# Patient Record
Sex: MALE | Race: WHITE | NOT HISPANIC OR LATINO
[De-identification: names, ages, dates, MRNs, and addresses within clinical notes are randomized per-mention and may not be internally consistent; named-entity substitution may affect disease eponyms.]

---

## 2017-11-22 PROBLEM — Z00.00 ENCOUNTER FOR PREVENTIVE HEALTH EXAMINATION: Status: ACTIVE | Noted: 2017-11-22

## 2017-11-30 ENCOUNTER — APPOINTMENT (OUTPATIENT)
Dept: GASTROENTEROLOGY | Facility: CLINIC | Age: 72
End: 2017-11-30
Payer: MEDICARE

## 2017-11-30 VITALS
WEIGHT: 147 LBS | BODY MASS INDEX: 21.05 KG/M2 | SYSTOLIC BLOOD PRESSURE: 130 MMHG | HEIGHT: 70 IN | TEMPERATURE: 98.1 F | DIASTOLIC BLOOD PRESSURE: 82 MMHG

## 2017-11-30 DIAGNOSIS — Z87.09 PERSONAL HISTORY OF OTHER DISEASES OF THE RESPIRATORY SYSTEM: ICD-10-CM

## 2017-11-30 DIAGNOSIS — Z78.9 OTHER SPECIFIED HEALTH STATUS: ICD-10-CM

## 2017-11-30 DIAGNOSIS — Z86.39 PERSONAL HISTORY OF OTHER ENDOCRINE, NUTRITIONAL AND METABOLIC DISEASE: ICD-10-CM

## 2017-11-30 PROCEDURE — 99204 OFFICE O/P NEW MOD 45 MIN: CPT

## 2017-12-01 ENCOUNTER — RESULT REVIEW (OUTPATIENT)
Age: 72
End: 2017-12-01

## 2017-12-01 LAB
ALBUMIN SERPL ELPH-MCNC: 4.1 G/DL
ALP BLD-CCNC: 91 U/L
ALT SERPL-CCNC: 21 U/L
ANION GAP SERPL CALC-SCNC: 16 MMOL/L
AST SERPL-CCNC: 35 U/L
BASOPHILS # BLD AUTO: 0.03 K/UL
BASOPHILS NFR BLD AUTO: 0.4 %
BILIRUB SERPL-MCNC: 0.6 MG/DL
BUN SERPL-MCNC: 13 MG/DL
CALCIUM SERPL-MCNC: 9.4 MG/DL
CHLORIDE SERPL-SCNC: 101 MMOL/L
CO2 SERPL-SCNC: 25 MMOL/L
CREAT SERPL-MCNC: 0.96 MG/DL
CRP SERPL-MCNC: 0.7 MG/DL
EOSINOPHIL # BLD AUTO: 0.32 K/UL
EOSINOPHIL NFR BLD AUTO: 3.8
ERYTHROCYTE [SEDIMENTATION RATE] IN BLOOD BY WESTERGREN METHOD: 22 MM/HR
FERRITIN SERPL-MCNC: 23 NG/ML
GLUCOSE SERPL-MCNC: 70 MG/DL
HCT VFR BLD CALC: 45.1 %
HGB BLD-MCNC: 14.7 G/DL
IMM GRANULOCYTES NFR BLD AUTO: 0.4 %
IRON SATN MFR SERPL: 10 %
IRON SERPL-MCNC: 34 UG/DL
LYMPHOCYTES # BLD AUTO: 0.98 K/UL
LYMPHOCYTES NFR BLD AUTO: 11.6 %
MAN DIFF?: NORMAL
MCHC RBC-ENTMCNC: 28.9 PG
MCHC RBC-ENTMCNC: 32.6 GM/DL
MCV RBC AUTO: 88.6 FL
MONOCYTES # BLD AUTO: 0.94 K/UL
MONOCYTES NFR BLD AUTO: 11.1 %
NEUTROPHILS # BLD AUTO: 6.15 K/UL
NEUTROPHILS NFR BLD AUTO: 72.7 %
PLATELET # BLD AUTO: 348 K/UL
POTASSIUM SERPL-SCNC: 4 MMOL/L
PROT SERPL-MCNC: 7.5 G/DL
RBC # BLD: 5.09 M/UL
RBC # FLD: 13.6 %
SODIUM SERPL-SCNC: 142 MMOL/L
TIBC SERPL-MCNC: 352 UG/DL
TSH SERPL-ACNC: 2.47 UIU/ML
UIBC SERPL-MCNC: 318 UG/DL
WBC # FLD AUTO: 8.45 K/UL

## 2017-12-01 RX ORDER — MESALAMINE 1.2 G/1
1.2 TABLET, DELAYED RELEASE ORAL
Qty: 360 | Refills: 1 | Status: DISCONTINUED | COMMUNITY
Start: 2017-11-30 | End: 2017-12-01

## 2017-12-04 ENCOUNTER — OTHER (OUTPATIENT)
Age: 72
End: 2017-12-04

## 2017-12-07 ENCOUNTER — FORM ENCOUNTER (OUTPATIENT)
Age: 72
End: 2017-12-07

## 2017-12-08 ENCOUNTER — APPOINTMENT (OUTPATIENT)
Dept: CT IMAGING | Facility: CLINIC | Age: 72
End: 2017-12-08
Payer: MEDICARE

## 2017-12-08 ENCOUNTER — OUTPATIENT (OUTPATIENT)
Dept: OUTPATIENT SERVICES | Facility: HOSPITAL | Age: 72
LOS: 1 days | End: 2017-12-08
Payer: COMMERCIAL

## 2017-12-08 DIAGNOSIS — Z00.8 ENCOUNTER FOR OTHER GENERAL EXAMINATION: ICD-10-CM

## 2017-12-08 PROCEDURE — 74177 CT ABD & PELVIS W/CONTRAST: CPT | Mod: 26

## 2017-12-08 PROCEDURE — 74177 CT ABD & PELVIS W/CONTRAST: CPT

## 2017-12-11 ENCOUNTER — OTHER (OUTPATIENT)
Age: 72
End: 2017-12-11

## 2017-12-12 ENCOUNTER — OTHER (OUTPATIENT)
Age: 72
End: 2017-12-12

## 2017-12-13 ENCOUNTER — OTHER (OUTPATIENT)
Age: 72
End: 2017-12-13

## 2017-12-18 ENCOUNTER — APPOINTMENT (OUTPATIENT)
Dept: GASTROENTEROLOGY | Facility: CLINIC | Age: 72
End: 2017-12-18
Payer: MEDICARE

## 2017-12-18 VITALS
DIASTOLIC BLOOD PRESSURE: 80 MMHG | WEIGHT: 150 LBS | SYSTOLIC BLOOD PRESSURE: 138 MMHG | TEMPERATURE: 98 F | HEIGHT: 70 IN | BODY MASS INDEX: 21.47 KG/M2

## 2017-12-18 DIAGNOSIS — R19.7 DIARRHEA, UNSPECIFIED: ICD-10-CM

## 2017-12-18 DIAGNOSIS — K51.90 ULCERATIVE COLITIS, UNSPECIFIED, W/OUT COMPLICATIONS: ICD-10-CM

## 2017-12-18 DIAGNOSIS — R63.4 ABNORMAL WEIGHT LOSS: ICD-10-CM

## 2017-12-18 PROCEDURE — 99214 OFFICE O/P EST MOD 30 MIN: CPT

## 2017-12-18 RX ORDER — GLUC/MSM/COLGN2/HYAL/ANTIARTH3 375-375-20
TABLET ORAL
Refills: 0 | Status: ACTIVE | COMMUNITY

## 2018-02-09 ENCOUNTER — RX RENEWAL (OUTPATIENT)
Age: 73
End: 2018-02-09

## 2018-02-09 ENCOUNTER — APPOINTMENT (OUTPATIENT)
Dept: GASTROENTEROLOGY | Facility: AMBULATORY MEDICAL SERVICES | Age: 73
End: 2018-02-09
Payer: MEDICARE

## 2018-02-09 ENCOUNTER — OTHER (OUTPATIENT)
Age: 73
End: 2018-02-09

## 2018-02-09 PROCEDURE — 43239 EGD BIOPSY SINGLE/MULTIPLE: CPT

## 2018-02-09 PROCEDURE — 45380 COLONOSCOPY AND BIOPSY: CPT

## 2018-02-12 ENCOUNTER — OTHER (OUTPATIENT)
Age: 73
End: 2018-02-12

## 2018-02-26 ENCOUNTER — APPOINTMENT (OUTPATIENT)
Dept: GASTROENTEROLOGY | Facility: CLINIC | Age: 73
End: 2018-02-26
Payer: MEDICARE

## 2018-02-26 VITALS
WEIGHT: 153 LBS | BODY MASS INDEX: 21.9 KG/M2 | TEMPERATURE: 97.9 F | SYSTOLIC BLOOD PRESSURE: 142 MMHG | DIASTOLIC BLOOD PRESSURE: 88 MMHG | HEIGHT: 70 IN

## 2018-02-26 PROCEDURE — 99215 OFFICE O/P EST HI 40 MIN: CPT

## 2018-02-26 RX ORDER — MESALAMINE 4 G/60ML
4 ENEMA RECTAL
Qty: 1680 | Refills: 0 | Status: DISCONTINUED | COMMUNITY
Start: 2017-11-18 | End: 2018-02-26

## 2018-02-26 RX ORDER — POLYETHYLENE GLYCOL-3350 AND ELECTROLYTES 236; 6.74; 5.86; 2.97; 22.74 G/274.31G; G/274.31G; G/274.31G; G/274.31G; G/274.31G
236 POWDER, FOR SOLUTION ORAL
Qty: 1 | Refills: 0 | Status: DISCONTINUED | COMMUNITY
Start: 2017-12-18 | End: 2018-02-26

## 2018-02-27 ENCOUNTER — OTHER (OUTPATIENT)
Age: 73
End: 2018-02-27

## 2018-02-27 LAB
FERRITIN SERPL-MCNC: 20 NG/ML
IRON SATN MFR SERPL: 41 %
IRON SERPL-MCNC: 138 UG/DL
TIBC SERPL-MCNC: 333 UG/DL
UIBC SERPL-MCNC: 195 UG/DL

## 2018-06-04 ENCOUNTER — APPOINTMENT (OUTPATIENT)
Dept: GASTROENTEROLOGY | Facility: CLINIC | Age: 73
End: 2018-06-04
Payer: MEDICARE

## 2018-06-04 ENCOUNTER — LABORATORY RESULT (OUTPATIENT)
Age: 73
End: 2018-06-04

## 2018-06-04 VITALS
TEMPERATURE: 97.9 F | HEIGHT: 70 IN | OXYGEN SATURATION: 96 % | DIASTOLIC BLOOD PRESSURE: 88 MMHG | HEART RATE: 67 BPM | WEIGHT: 156 LBS | BODY MASS INDEX: 22.33 KG/M2 | SYSTOLIC BLOOD PRESSURE: 136 MMHG

## 2018-06-04 PROCEDURE — 99214 OFFICE O/P EST MOD 30 MIN: CPT

## 2018-06-04 RX ORDER — RANITIDINE 300 MG/1
300 TABLET ORAL
Qty: 30 | Refills: 2 | Status: DISCONTINUED | COMMUNITY
Start: 2018-02-09 | End: 2018-06-04

## 2018-06-05 ENCOUNTER — OTHER (OUTPATIENT)
Age: 73
End: 2018-06-05

## 2018-06-05 LAB
BASOPHILS # BLD AUTO: 0.04 K/UL
BASOPHILS NFR BLD AUTO: 0.4 %
EOSINOPHIL # BLD AUTO: 0.47 K/UL
EOSINOPHIL NFR BLD AUTO: 5.1 %
FERRITIN SERPL-MCNC: 27 NG/ML
HCT VFR BLD CALC: 48 %
HGB BLD-MCNC: 15.2 G/DL
IMM GRANULOCYTES NFR BLD AUTO: 2.1 %
IRON SATN MFR SERPL: 45 %
IRON SERPL-MCNC: 148 UG/DL
LYMPHOCYTES # BLD AUTO: 1.68 K/UL
LYMPHOCYTES NFR BLD AUTO: 18.3 %
MAN DIFF?: NORMAL
MCHC RBC-ENTMCNC: 28.6 PG
MCHC RBC-ENTMCNC: 31.7 GM/DL
MCV RBC AUTO: 90.4 FL
MONOCYTES # BLD AUTO: 1.03 K/UL
MONOCYTES NFR BLD AUTO: 11.2 %
NEUTROPHILS # BLD AUTO: 5.77 K/UL
NEUTROPHILS NFR BLD AUTO: 62.9 %
PLATELET # BLD AUTO: 318 K/UL
RBC # BLD: 5.31 M/UL
RBC # FLD: 15.4 %
TIBC SERPL-MCNC: 332 UG/DL
UIBC SERPL-MCNC: 184 UG/DL
WBC # FLD AUTO: 9.18 K/UL

## 2018-10-01 ENCOUNTER — APPOINTMENT (OUTPATIENT)
Dept: GASTROENTEROLOGY | Facility: CLINIC | Age: 73
End: 2018-10-01
Payer: MEDICARE

## 2018-10-01 VITALS
SYSTOLIC BLOOD PRESSURE: 120 MMHG | HEART RATE: 91 BPM | HEIGHT: 70 IN | OXYGEN SATURATION: 96 % | DIASTOLIC BLOOD PRESSURE: 80 MMHG | BODY MASS INDEX: 23.19 KG/M2 | TEMPERATURE: 98.3 F | WEIGHT: 162 LBS

## 2018-10-01 DIAGNOSIS — D50.0 IRON DEFICIENCY ANEMIA SECONDARY TO BLOOD LOSS (CHRONIC): ICD-10-CM

## 2018-10-01 DIAGNOSIS — K29.00 ACUTE GASTRITIS W/OUT BLEEDING: ICD-10-CM

## 2018-10-01 PROCEDURE — 99214 OFFICE O/P EST MOD 30 MIN: CPT

## 2018-10-01 RX ORDER — HYDROCORTISONE 100 MG/60ML
100 ENEMA RECTAL
Qty: 28 | Refills: 1 | Status: DISCONTINUED | COMMUNITY
Start: 2018-02-09 | End: 2018-10-01

## 2018-10-01 RX ORDER — MESALAMINE 800 MG/1
800 TABLET, DELAYED RELEASE ORAL
Qty: 90 | Refills: 5 | Status: DISCONTINUED | COMMUNITY
Start: 2017-12-01 | End: 2018-10-01

## 2018-10-01 RX ORDER — MESALAMINE 1.2 G/1
1.2 TABLET, DELAYED RELEASE ORAL
Qty: 360 | Refills: 1 | Status: DISCONTINUED | COMMUNITY
Start: 2018-06-04 | End: 2018-10-01

## 2018-10-02 ENCOUNTER — OTHER (OUTPATIENT)
Age: 73
End: 2018-10-02

## 2018-10-02 LAB
BASOPHILS # BLD AUTO: 0.04 K/UL
BASOPHILS NFR BLD AUTO: 0.5 %
EOSINOPHIL # BLD AUTO: 0.46 K/UL
EOSINOPHIL NFR BLD AUTO: 6 %
FERRITIN SERPL-MCNC: 59 NG/ML
HCT VFR BLD CALC: 45.7 %
HGB BLD-MCNC: 15.3 G/DL
IMM GRANULOCYTES NFR BLD AUTO: 1 %
IRON SATN MFR SERPL: 13 %
IRON SERPL-MCNC: 43 UG/DL
LYMPHOCYTES # BLD AUTO: 1.28 K/UL
LYMPHOCYTES NFR BLD AUTO: 16.6 %
MAN DIFF?: NORMAL
MCHC RBC-ENTMCNC: 29.9 PG
MCHC RBC-ENTMCNC: 33.5 GM/DL
MCV RBC AUTO: 89.3 FL
MONOCYTES # BLD AUTO: 1.02 K/UL
MONOCYTES NFR BLD AUTO: 13.2 %
NEUTROPHILS # BLD AUTO: 4.84 K/UL
NEUTROPHILS NFR BLD AUTO: 62.7 %
PLATELET # BLD AUTO: 300 K/UL
RBC # BLD: 5.12 M/UL
RBC # FLD: 14.3 %
TIBC SERPL-MCNC: 327 UG/DL
UIBC SERPL-MCNC: 284 UG/DL
WBC # FLD AUTO: 7.72 K/UL

## 2018-12-17 ENCOUNTER — APPOINTMENT (OUTPATIENT)
Dept: DERMATOLOGY | Facility: CLINIC | Age: 73
End: 2018-12-17
Payer: MEDICARE

## 2018-12-17 DIAGNOSIS — Z81.8 FAMILY HISTORY OF OTHER MENTAL AND BEHAVIORAL DISORDERS: ICD-10-CM

## 2018-12-17 DIAGNOSIS — Z82.49 FAMILY HISTORY OF ISCHEMIC HEART DISEASE AND OTHER DISEASES OF THE CIRCULATORY SYSTEM: ICD-10-CM

## 2018-12-17 DIAGNOSIS — L30.9 DERMATITIS, UNSPECIFIED: ICD-10-CM

## 2018-12-17 PROCEDURE — 99203 OFFICE O/P NEW LOW 30 MIN: CPT

## 2018-12-31 ENCOUNTER — APPOINTMENT (OUTPATIENT)
Dept: DERMATOLOGY | Facility: CLINIC | Age: 73
End: 2018-12-31
Payer: MEDICARE

## 2018-12-31 VITALS — SYSTOLIC BLOOD PRESSURE: 130 MMHG | DIASTOLIC BLOOD PRESSURE: 70 MMHG

## 2018-12-31 DIAGNOSIS — L71.9 ROSACEA, UNSPECIFIED: ICD-10-CM

## 2018-12-31 PROCEDURE — 99213 OFFICE O/P EST LOW 20 MIN: CPT

## 2019-05-24 ENCOUNTER — APPOINTMENT (OUTPATIENT)
Dept: DERMATOLOGY | Facility: CLINIC | Age: 74
End: 2019-05-24

## 2022-07-11 ENCOUNTER — RESULT REVIEW (OUTPATIENT)
Age: 77
End: 2022-07-11

## 2022-09-16 ENCOUNTER — APPOINTMENT (OUTPATIENT)
Dept: GASTROENTEROLOGY | Facility: CLINIC | Age: 77
End: 2022-09-16

## 2022-09-16 VITALS
BODY MASS INDEX: 22.33 KG/M2 | DIASTOLIC BLOOD PRESSURE: 80 MMHG | SYSTOLIC BLOOD PRESSURE: 140 MMHG | HEIGHT: 70 IN | OXYGEN SATURATION: 98 % | HEART RATE: 72 BPM | TEMPERATURE: 97.1 F | WEIGHT: 156 LBS

## 2022-09-16 DIAGNOSIS — J44.9 CHRONIC OBSTRUCTIVE PULMONARY DISEASE, UNSPECIFIED: ICD-10-CM

## 2022-09-16 DIAGNOSIS — Z20.822 ENCOUNTER FOR PREPROCEDURAL LABORATORY EXAMINATION: ICD-10-CM

## 2022-09-16 DIAGNOSIS — K51.211 ULCERATIVE (CHRONIC) PROCTITIS WITH RECTAL BLEEDING: ICD-10-CM

## 2022-09-16 DIAGNOSIS — Z01.812 ENCOUNTER FOR PREPROCEDURAL LABORATORY EXAMINATION: ICD-10-CM

## 2022-09-16 PROCEDURE — 99214 OFFICE O/P EST MOD 30 MIN: CPT

## 2022-09-16 NOTE — CONSULT LETTER
[Dear  ___] : Dear ~JASSON, [Consult Letter:] : I had the pleasure of evaluating your patient, [unfilled]. [Please see my note below.] : Please see my note below. [Consult Closing:] : Thank you very much for allowing me to participate in the care of this patient.  If you have any questions, please do not hesitate to contact me. [Sincerely,] : Sincerely, [FreeTextEntry2] : Malcolm Coffey MD\par [facility]\par [address] [place] [address]\par [place], [place]  [FreeTextEntry3] : Gera Fuentes MD\par

## 2022-09-16 NOTE — ASSESSMENT
[FreeTextEntry1] : Impression\par \par History of ulcerative proctosigmoiditis\par \par Due for screening colonoscopy with dysplasia screening\par \par History of iron deficiency anemia, he tells me most recent lab work with primary care provider no longer anemic\par \par Clinically doing very well\par \par Suggest\par \par Colonoscopy with dysplasia screening\par \par Suprep\par \par Risks/benefits:\par The procedure, the risks and benefits and alternatives have been reviewed in great detail with the patient.  Risks including, but not limited to sedation such as cardiac and pulmonary compromise, the procedure itself such as bleeding requiring hospitalization, transfusion, surgery, temporary or permanent colostomy.  Perforation or puncture of the requiring hospitalization, surgery, temporary colostomy.\par It has been explained to the patient that though colonoscopy is thought to be the best screening exam for colon cancer and polyps, no screening exam can find all colon polyps or cancers.  \par The patient expresses understanding of the procedure and consents to undergoing the procedure.\par \par

## 2022-09-16 NOTE — PHYSICAL EXAM
[Alert] : alert [Normal Voice/Communication] : normal voice/communication [Healthy Appearing] : healthy appearing [No Acute Distress] : no acute distress [Normal] : the sclera and conjunctiva were normal [Sclera] : the sclera and conjunctiva were normal [Hearing Threshold Finger Rub Not Curry] : hearing was normal [Normal Appearance] : the appearance of the neck was normal [No Neck Mass] : no neck mass was observed [No Respiratory Distress] : no respiratory distress [No Acc Muscle Use] : no accessory muscle use [Respiration, Rhythm And Depth] : normal respiratory rhythm and effort [Heart Rate And Rhythm] : heart rate was normal and rhythm regular [Bowel Sounds] : normal bowel sounds [Abdomen Tenderness] : non-tender [No Masses] : no abdominal mass palpated [Abdomen Soft] : soft [] : no hepatosplenomegaly [Cervical Lymph Nodes Enlarged Posterior Bilaterally] : no posterior cervical lymphadenopathy [No CVA Tenderness] : no CVA  tenderness [No Spinal Tenderness] : no spinal tenderness [Abnormal Walk] : normal gait [Normal Color / Pigmentation] : normal skin color and pigmentation [No Focal Deficits] : no focal deficits [Oriented To Time, Place, And Person] : oriented to person, place, and time [de-identified] : Very healthy appearing 77-year-old pleasant gentleman, no acute distress, appears younger than his stated age

## 2022-09-16 NOTE — HISTORY OF PRESENT ILLNESS
[FreeTextEntry1] : Malcolm Coffey MD\par [facility]\par [address] [facility]\par [place], [place] \par \par Pleasant 77-year-old retired \par \par Long history of ulcerative proctosigmoiditis\par \par Currently able to get by without medications, he had been on mesalamine in the past\par \par He is due for screening colonoscopy, last one being done about 4 years ago\par \par Bowel movements are now normal\par \par He did have a Hemoccult positive stool last year but repeat this year was negative\par \par He had been iron deficiency in the past, but he tells me recent blood work with the primary care provider was normal

## 2022-09-16 NOTE — REASON FOR VISIT
[Follow-up] : a follow-up of an existing diagnosis [FreeTextEntry1] : Ulcerative proctosigmoiditis clinically in remission on no medications, he had been iron deficient in the past, but he tells me recent lab work with primary care provider was normal

## 2022-09-19 DIAGNOSIS — K57.30 DIVERTICULOSIS OF LARGE INTESTINE W/OUT PERFORATION OR ABSCESS W/OUT BLEEDING: ICD-10-CM

## 2022-10-04 ENCOUNTER — APPOINTMENT (OUTPATIENT)
Dept: DERMATOLOGY | Facility: CLINIC | Age: 77
End: 2022-10-04

## 2022-10-04 VITALS — BODY MASS INDEX: 22.96 KG/M2 | WEIGHT: 160 LBS

## 2022-10-04 DIAGNOSIS — L21.9 SEBORRHEIC DERMATITIS, UNSPECIFIED: ICD-10-CM

## 2022-10-04 DIAGNOSIS — L57.0 ACTINIC KERATOSIS: ICD-10-CM

## 2022-10-04 DIAGNOSIS — Z12.83 ENCOUNTER FOR SCREENING FOR MALIGNANT NEOPLASM OF SKIN: ICD-10-CM

## 2022-10-04 DIAGNOSIS — L82.1 OTHER SEBORRHEIC KERATOSIS: ICD-10-CM

## 2022-10-04 PROCEDURE — 17000 DESTRUCT PREMALG LESION: CPT

## 2022-10-04 PROCEDURE — 99203 OFFICE O/P NEW LOW 30 MIN: CPT | Mod: 25

## 2022-10-04 PROCEDURE — 17003 DESTRUCT PREMALG LES 2-14: CPT

## 2022-10-04 NOTE — ASSESSMENT
[FreeTextEntry1] : #Actinic keratoses- \par We have discussed the nature and usual course of these lesions. \par Cryotherapy administered with 2 cycles to 3 actinic keratoses on the  left cheek at the angle of the jaw x1, right nasal sidewall x1, and vertex scalp x1.\par The procedure was well-tolerated and without complication. We have discussed related skin care and cryotherapy aftercare.\par \par #Seborrheic dermatitis, face, chronic, flaring-\par We have discussed the nature and course of this condition. \par We have discussed treatment options, expectations from treatment, and associated side effects of topical therapies.\par Patient opts for OTC selenium sulfide shampoo at this time. Counseled on instructions for use. \par \par #Seborrheic keratosis\par Lesion of concern today a seborrheic keratosis.\par No treatment necessary for benign lesions.\par I have counseled the Patient on the importance of sun protection and monthly self skin exams at home. \par We have discussed proper sun protection habits and techniques, monthly self-skin exams and the ABCDEs of melanoma.\par I have asked the Patient to notify me of any new or changing lesions.\par \par #Skin check - Waist-up\par -ABCDEs of melanoma, sun safety with OTC SPF 30+, and self-skin checks reviewed\par -Counseled patient to notify us of any changing lesions\par -RTC 12 months, sooner PRN \par \par \par \par \par

## 2022-10-04 NOTE — HISTORY OF PRESENT ILLNESS
[FreeTextEntry1] : mole check [de-identified] : Patient is a 77 year-old man who presents today for evaluation of the following:\par LV 12/31/2018\par \par 1. Mole check- Spot on Patient's right forehead noticed by his son over [place]. Denies bleeding, pruritus, changes. \par 2. Patient requests a skin check today of his upper body. 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 Patient purchased OTC sulfur soap. \par Patient reports not needing to use the topical medications for > 3 years.\par

## 2022-10-04 NOTE — PHYSICAL EXAM
[FreeTextEntry3] : Patient was alert, well-nourished, conjunctiva non-injected, no visual lymphadenopathy, no clubbing, no edema, no bromhidrosis, and no chromhidrosis\par Patient declined full body exam today. Upper body examined only. \par AAx3, NAD, well-appearing/pleasant.\par Focused examination within normal limits with the exception of:\par \par - Scattered, poorly circumscribed red erythematous, rough papules on the left cheek at the angle of the jaw x1, right nasal sidewall x1, and vertex scalp x1\par \par - Pink, erythematous patches with thicker yellowish scale on the left medial canthus and right inferior eyelid\par \par - Several scattered, red, partially blanching papules on the trunk and extremities.\par \par - Fairly uniform and regular brown macules and papules on the trunk and extremities.\par \par \par \par \par \par

## 2022-11-02 LAB — SARS-COV-2 N GENE NPH QL NAA+PROBE: NOT DETECTED

## 2022-11-07 ENCOUNTER — APPOINTMENT (OUTPATIENT)
Dept: GASTROENTEROLOGY | Facility: AMBULATORY MEDICAL SERVICES | Age: 77
End: 2022-11-07

## 2022-11-07 PROCEDURE — 45380 COLONOSCOPY AND BIOPSY: CPT

## 2022-11-14 ENCOUNTER — NON-APPOINTMENT (OUTPATIENT)
Age: 77
End: 2022-11-14

## 2023-10-02 ENCOUNTER — APPOINTMENT (OUTPATIENT)
Dept: ELECTROPHYSIOLOGY | Facility: CLINIC | Age: 78
End: 2023-10-02
Payer: MEDICARE

## 2023-10-02 ENCOUNTER — NON-APPOINTMENT (OUTPATIENT)
Age: 78
End: 2023-10-02

## 2023-10-02 VITALS
SYSTOLIC BLOOD PRESSURE: 176 MMHG | DIASTOLIC BLOOD PRESSURE: 100 MMHG | OXYGEN SATURATION: 95 % | HEIGHT: 70 IN | HEART RATE: 80 BPM

## 2023-10-02 VITALS — DIASTOLIC BLOOD PRESSURE: 96 MMHG | SYSTOLIC BLOOD PRESSURE: 170 MMHG

## 2023-10-02 DIAGNOSIS — E78.5 HYPERLIPIDEMIA, UNSPECIFIED: ICD-10-CM

## 2023-10-02 DIAGNOSIS — I10 ESSENTIAL (PRIMARY) HYPERTENSION: ICD-10-CM

## 2023-10-02 PROCEDURE — 99204 OFFICE O/P NEW MOD 45 MIN: CPT | Mod: 25

## 2023-10-02 PROCEDURE — 93000 ELECTROCARDIOGRAM COMPLETE: CPT

## 2023-10-02 RX ORDER — AMLODIPINE BESYLATE 5 MG/1
5 TABLET ORAL DAILY
Qty: 90 | Refills: 3 | Status: ACTIVE | COMMUNITY
Start: 2023-10-02 | End: 1900-01-01

## 2023-10-02 RX ORDER — METOPROLOL SUCCINATE 25 MG/1
25 TABLET, EXTENDED RELEASE ORAL
Refills: 2 | Status: ACTIVE | COMMUNITY
Start: 2023-10-02

## 2023-10-02 RX ORDER — LOVASTATIN 40 MG/1
40 TABLET ORAL
Refills: 0 | Status: DISCONTINUED | COMMUNITY
End: 2023-10-02

## 2023-10-11 ENCOUNTER — OUTPATIENT (OUTPATIENT)
Dept: OUTPATIENT SERVICES | Facility: HOSPITAL | Age: 78
LOS: 1 days | Discharge: HOME | End: 2023-10-11
Payer: MEDICARE

## 2023-10-11 DIAGNOSIS — I48.0 PAROXYSMAL ATRIAL FIBRILLATION: ICD-10-CM

## 2023-10-11 PROCEDURE — 33285 INSJ SUBQ CAR RHYTHM MNTR: CPT

## 2023-10-11 RX ORDER — ASCORBIC ACID 60 MG
1 TABLET,CHEWABLE ORAL
Refills: 0 | DISCHARGE

## 2023-10-11 RX ORDER — ROSUVASTATIN CALCIUM 5 MG/1
1 TABLET ORAL
Refills: 0 | DISCHARGE

## 2023-10-11 RX ORDER — AMLODIPINE BESYLATE 2.5 MG/1
1 TABLET ORAL
Refills: 0 | DISCHARGE

## 2023-10-11 RX ORDER — ASPIRIN/CALCIUM CARB/MAGNESIUM 324 MG
1 TABLET ORAL
Refills: 0 | DISCHARGE

## 2023-10-11 RX ORDER — METOPROLOL TARTRATE 50 MG
1 TABLET ORAL
Refills: 0 | DISCHARGE

## 2023-10-11 NOTE — H&P CARDIOLOGY - HISTORY OF PRESENT ILLNESS
77 y/o M w/ PMH of COPD, HLD, and newly found paroxysmal Afib who presents for ILR implantation. Pt recently found to be in Afib on outpatient follow up in the setting of bronchitis. Holter was worn without reoccurrence of Afib. ILR for long term monitoring of Afib and to allow for him to remain off AC in future. 79 y/o M w/ PMH of COPD, HLD, and newly found paroxysmal Afib who presents for ILR implantation. Pt recently found to be in Afib on outpatient follow up in the setting of bronchitis and Albuterol usage. Holter was worn without reoccurrence of Afib. ILR for long term monitoring of Afib and to allow for him to remain off AC in future.

## 2023-10-11 NOTE — CHART NOTE - NSCHARTNOTEFT_GEN_A_CORE
pt. is s/p ILR     ILR  was covered with a  clear with a gauge and clear Tegaderm. It was clean, dry, and intact. No bleeding, drainage hematoma, or ecchymosis appreciated.          Post-op ILR instruction has been verbally explained and given to the patient. Patient was given ID card, Booklet and home monitor. Patient expressed understanding and all questions were answered. A carbon copy is located in the patient chart  Patient is schedule for an appointment on 10/30 at 9:20am in the EP Clinic ( 4th floor Oncology building [facility] [address], [address], [place], [place], [address] [phone] )    You can return to normal activites 24hours after the procedure   No scrubbing the incision site for 2 weeks  No lotion, ointment, powder or direct sunlight to the incision site for 2 weeks   No swimming pool, Jacuzzi, or bath for 5 days.   Patient can shower 24 hours after procedure. Pat the area dry  Pt was instructed to call [phone] if the following occurs:      - fever with temperature > 100.6      - swelling, drainage or bleeding at the site incision       - severe chest pain, SOB, n/v

## 2023-10-11 NOTE — H&P CARDIOLOGY - NSICDXPASTMEDICALHX_GEN_ALL_CORE_FT
PAST MEDICAL HISTORY:  Atrial fibrillation     History of COPD     HLD (hyperlipidemia)     HTN (hypertension)

## 2023-10-28 ENCOUNTER — TRANSCRIPTION ENCOUNTER (OUTPATIENT)
Age: 78
End: 2023-10-28

## 2023-10-30 ENCOUNTER — NON-APPOINTMENT (OUTPATIENT)
Age: 78
End: 2023-10-30

## 2023-10-30 ENCOUNTER — APPOINTMENT (OUTPATIENT)
Dept: ELECTROPHYSIOLOGY | Facility: CLINIC | Age: 78
End: 2023-10-30
Payer: MEDICARE

## 2023-10-30 VITALS — HEART RATE: 70 BPM | RESPIRATION RATE: 14 BRPM | DIASTOLIC BLOOD PRESSURE: 81 MMHG | SYSTOLIC BLOOD PRESSURE: 148 MMHG

## 2023-10-30 DIAGNOSIS — I48.0 PAROXYSMAL ATRIAL FIBRILLATION: ICD-10-CM

## 2023-10-30 PROCEDURE — 93291 INTERROG DEV EVAL SCRMS IP: CPT

## 2023-10-30 RX ORDER — ROSUVASTATIN CALCIUM 10 MG/1
10 TABLET, FILM COATED ORAL
Refills: 0 | Status: ACTIVE | COMMUNITY
Start: 2023-10-02

## 2023-10-30 RX ORDER — POLYETHYLENE GLYCOL 3350, SODIUM CHLORIDE, SODIUM BICARBONATE AND POTASSIUM CHLORIDE WITH LEMON FLAVOR 420; 11.2; 5.72; 1.48 G/4L; G/4L; G/4L; G/4L
420 POWDER, FOR SOLUTION ORAL
Qty: 1 | Refills: 0 | Status: DISCONTINUED | COMMUNITY
Start: 2022-09-19 | End: 2023-10-30

## 2023-10-30 RX ORDER — TRIAMCINOLONE ACETONIDE 1 MG/G
0.1 CREAM TOPICAL
Qty: 1 | Refills: 0 | Status: DISCONTINUED | COMMUNITY
Start: 2018-12-17 | End: 2023-10-30

## 2023-10-30 RX ORDER — SODIUM SULFACETAMIDE 100 MG/G
10 LIQUID TOPICAL
Qty: 1 | Refills: 4 | Status: DISCONTINUED | COMMUNITY
Start: 2018-12-31 | End: 2023-10-30

## 2023-10-30 RX ORDER — METRONIDAZOLE 7.5 MG/G
0.75 CREAM TOPICAL TWICE DAILY
Qty: 1 | Refills: 0 | Status: DISCONTINUED | COMMUNITY
Start: 2018-12-17 | End: 2023-10-30

## 2023-10-30 RX ORDER — SODIUM SULFATE, POTASSIUM SULFATE AND MAGNESIUM SULFATE 1.6; 3.13; 17.5 G/177ML; G/177ML; G/177ML
17.5-3.13-1.6 SOLUTION ORAL
Qty: 1 | Refills: 0 | Status: DISCONTINUED | COMMUNITY
Start: 2022-09-16 | End: 2023-10-30

## 2025-04-19 ENCOUNTER — INPATIENT (INPATIENT)
Facility: HOSPITAL | Age: 80
LOS: 9 days | Discharge: HOME | End: 2025-04-29
Attending: STUDENT IN AN ORGANIZED HEALTH CARE EDUCATION/TRAINING PROGRAM | Admitting: STUDENT IN AN ORGANIZED HEALTH CARE EDUCATION/TRAINING PROGRAM
Payer: MEDICARE

## 2025-04-19 VITALS
DIASTOLIC BLOOD PRESSURE: 68 MMHG | RESPIRATION RATE: 28 BRPM | OXYGEN SATURATION: 94 % | SYSTOLIC BLOOD PRESSURE: 105 MMHG | TEMPERATURE: 98 F | HEART RATE: 87 BPM | WEIGHT: 154.98 LBS

## 2025-04-19 DIAGNOSIS — Z29.9 ENCOUNTER FOR PROPHYLACTIC MEASURES, UNSPECIFIED: ICD-10-CM

## 2025-04-19 DIAGNOSIS — N17.9 ACUTE KIDNEY FAILURE, UNSPECIFIED: ICD-10-CM

## 2025-04-19 DIAGNOSIS — I48.91 UNSPECIFIED ATRIAL FIBRILLATION: ICD-10-CM

## 2025-04-19 DIAGNOSIS — E78.5 HYPERLIPIDEMIA, UNSPECIFIED: ICD-10-CM

## 2025-04-19 DIAGNOSIS — J18.9 PNEUMONIA, UNSPECIFIED ORGANISM: ICD-10-CM

## 2025-04-19 DIAGNOSIS — A41.9 SEPSIS, UNSPECIFIED ORGANISM: ICD-10-CM

## 2025-04-19 DIAGNOSIS — J44.1 CHRONIC OBSTRUCTIVE PULMONARY DISEASE WITH (ACUTE) EXACERBATION: ICD-10-CM

## 2025-04-19 DIAGNOSIS — K86.2 CYST OF PANCREAS: ICD-10-CM

## 2025-04-19 DIAGNOSIS — I10 ESSENTIAL (PRIMARY) HYPERTENSION: ICD-10-CM

## 2025-04-19 DIAGNOSIS — E80.6 OTHER DISORDERS OF BILIRUBIN METABOLISM: ICD-10-CM

## 2025-04-19 PROBLEM — Z87.09 PERSONAL HISTORY OF OTHER DISEASES OF THE RESPIRATORY SYSTEM: Chronic | Status: ACTIVE | Noted: 2023-10-11

## 2025-04-19 LAB
ADD ON TEST-SPECIMEN IN LAB: SIGNIFICANT CHANGE UP
ALBUMIN SERPL ELPH-MCNC: 3.7 G/DL — SIGNIFICANT CHANGE UP (ref 3.3–5)
ALP SERPL-CCNC: 70 U/L — SIGNIFICANT CHANGE UP (ref 40–120)
ALT FLD-CCNC: 11 U/L — SIGNIFICANT CHANGE UP (ref 4–41)
ANION GAP SERPL CALC-SCNC: 13 MMOL/L — SIGNIFICANT CHANGE UP (ref 7–14)
ANION GAP SERPL CALC-SCNC: 14 MMOL/L — SIGNIFICANT CHANGE UP (ref 7–14)
APPEARANCE UR: CLEAR — SIGNIFICANT CHANGE UP
AST SERPL-CCNC: 20 U/L — SIGNIFICANT CHANGE UP (ref 4–40)
B PERT DNA SPEC QL NAA+PROBE: SIGNIFICANT CHANGE UP
B PERT+PARAPERT DNA PNL SPEC NAA+PROBE: SIGNIFICANT CHANGE UP
BASOPHILS # BLD AUTO: 0 K/UL — SIGNIFICANT CHANGE UP (ref 0–0.2)
BASOPHILS NFR BLD AUTO: 0 % — SIGNIFICANT CHANGE UP (ref 0–2)
BILIRUB DIRECT SERPL-MCNC: 0.2 MG/DL — SIGNIFICANT CHANGE UP (ref 0–0.3)
BILIRUB INDIRECT FLD-MCNC: 3 MG/DL — HIGH (ref 0–1)
BILIRUB SERPL-MCNC: 3.2 MG/DL — HIGH (ref 0.2–1.2)
BILIRUB SERPL-MCNC: 3.8 MG/DL — HIGH (ref 0.2–1.2)
BILIRUB UR-MCNC: NEGATIVE — SIGNIFICANT CHANGE UP
BLOOD GAS VENOUS COMPREHENSIVE RESULT: SIGNIFICANT CHANGE UP
BLOOD GAS VENOUS COMPREHENSIVE RESULT: SIGNIFICANT CHANGE UP
BUN SERPL-MCNC: 38 MG/DL — HIGH (ref 7–23)
BUN SERPL-MCNC: 43 MG/DL — HIGH (ref 7–23)
C PNEUM DNA SPEC QL NAA+PROBE: SIGNIFICANT CHANGE UP
CALCIUM SERPL-MCNC: 8 MG/DL — LOW (ref 8.4–10.5)
CALCIUM SERPL-MCNC: 9 MG/DL — SIGNIFICANT CHANGE UP (ref 8.4–10.5)
CHLORIDE SERPL-SCNC: 99 MMOL/L — SIGNIFICANT CHANGE UP (ref 98–107)
CHLORIDE SERPL-SCNC: 99 MMOL/L — SIGNIFICANT CHANGE UP (ref 98–107)
CO2 SERPL-SCNC: 20 MMOL/L — LOW (ref 22–31)
CO2 SERPL-SCNC: 24 MMOL/L — SIGNIFICANT CHANGE UP (ref 22–31)
COLOR SPEC: SIGNIFICANT CHANGE UP
CREAT SERPL-MCNC: 1.8 MG/DL — HIGH (ref 0.5–1.3)
CREAT SERPL-MCNC: 1.9 MG/DL — HIGH (ref 0.5–1.3)
D DIMER BLD IA.RAPID-MCNC: 614 NG/ML DDU — HIGH
DIFF PNL FLD: NEGATIVE — SIGNIFICANT CHANGE UP
EGFR: 35 ML/MIN/1.73M2 — LOW
EGFR: 35 ML/MIN/1.73M2 — LOW
EGFR: 38 ML/MIN/1.73M2 — LOW
EGFR: 38 ML/MIN/1.73M2 — LOW
EOSINOPHIL # BLD AUTO: 0 K/UL — SIGNIFICANT CHANGE UP (ref 0–0.5)
EOSINOPHIL NFR BLD AUTO: 0 % — SIGNIFICANT CHANGE UP (ref 0–6)
FLUAV AG NPH QL: SIGNIFICANT CHANGE UP
FLUAV SUBTYP SPEC NAA+PROBE: SIGNIFICANT CHANGE UP
FLUBV AG NPH QL: SIGNIFICANT CHANGE UP
FLUBV RNA SPEC QL NAA+PROBE: SIGNIFICANT CHANGE UP
GLUCOSE SERPL-MCNC: 114 MG/DL — HIGH (ref 70–99)
GLUCOSE SERPL-MCNC: 151 MG/DL — HIGH (ref 70–99)
GLUCOSE UR QL: NEGATIVE MG/DL — SIGNIFICANT CHANGE UP
HADV DNA SPEC QL NAA+PROBE: SIGNIFICANT CHANGE UP
HCOV 229E RNA SPEC QL NAA+PROBE: SIGNIFICANT CHANGE UP
HCOV HKU1 RNA SPEC QL NAA+PROBE: SIGNIFICANT CHANGE UP
HCOV NL63 RNA SPEC QL NAA+PROBE: SIGNIFICANT CHANGE UP
HCOV OC43 RNA SPEC QL NAA+PROBE: SIGNIFICANT CHANGE UP
HCT VFR BLD CALC: 45.3 % — SIGNIFICANT CHANGE UP (ref 39–50)
HGB BLD-MCNC: 15.1 G/DL — SIGNIFICANT CHANGE UP (ref 13–17)
HMPV RNA SPEC QL NAA+PROBE: SIGNIFICANT CHANGE UP
HPIV1 RNA SPEC QL NAA+PROBE: SIGNIFICANT CHANGE UP
HPIV2 RNA SPEC QL NAA+PROBE: SIGNIFICANT CHANGE UP
HPIV3 RNA SPEC QL NAA+PROBE: SIGNIFICANT CHANGE UP
HPIV4 RNA SPEC QL NAA+PROBE: SIGNIFICANT CHANGE UP
IANC: 27.73 K/UL — HIGH (ref 1.8–7.4)
KETONES UR-MCNC: ABNORMAL MG/DL
LEUKOCYTE ESTERASE UR-ACNC: NEGATIVE — SIGNIFICANT CHANGE UP
LYMPHOCYTES # BLD AUTO: 0 % — LOW (ref 13–44)
LYMPHOCYTES # BLD AUTO: 0 K/UL — LOW (ref 1–3.3)
M PNEUMO DNA SPEC QL NAA+PROBE: SIGNIFICANT CHANGE UP
MAGNESIUM SERPL-MCNC: 1.8 MG/DL — SIGNIFICANT CHANGE UP (ref 1.6–2.6)
MCHC RBC-ENTMCNC: 29.7 PG — SIGNIFICANT CHANGE UP (ref 27–34)
MCHC RBC-ENTMCNC: 33.3 G/DL — SIGNIFICANT CHANGE UP (ref 32–36)
MCV RBC AUTO: 89.2 FL — SIGNIFICANT CHANGE UP (ref 80–100)
MONOCYTES # BLD AUTO: 1.5 K/UL — HIGH (ref 0–0.9)
MONOCYTES NFR BLD AUTO: 4.7 % — SIGNIFICANT CHANGE UP (ref 2–14)
NEUTROPHILS # BLD AUTO: 26.31 K/UL — HIGH (ref 1.8–7.4)
NEUTROPHILS NFR BLD AUTO: 72.9 % — SIGNIFICANT CHANGE UP (ref 43–77)
NITRITE UR-MCNC: NEGATIVE — SIGNIFICANT CHANGE UP
PH UR: 5.5 — SIGNIFICANT CHANGE UP (ref 5–8)
PLATELET # BLD AUTO: 215 K/UL — SIGNIFICANT CHANGE UP (ref 150–400)
POTASSIUM SERPL-MCNC: 4.3 MMOL/L — SIGNIFICANT CHANGE UP (ref 3.5–5.3)
POTASSIUM SERPL-MCNC: 4.3 MMOL/L — SIGNIFICANT CHANGE UP (ref 3.5–5.3)
POTASSIUM SERPL-SCNC: 4.3 MMOL/L — SIGNIFICANT CHANGE UP (ref 3.5–5.3)
POTASSIUM SERPL-SCNC: 4.3 MMOL/L — SIGNIFICANT CHANGE UP (ref 3.5–5.3)
PROT SERPL-MCNC: 6.8 G/DL — SIGNIFICANT CHANGE UP (ref 6–8.3)
PROT UR-MCNC: 100 MG/DL
RAPID RVP RESULT: SIGNIFICANT CHANGE UP
RBC # BLD: 5.08 M/UL — SIGNIFICANT CHANGE UP (ref 4.2–5.8)
RBC # FLD: 14 % — SIGNIFICANT CHANGE UP (ref 10.3–14.5)
RSV RNA NPH QL NAA+NON-PROBE: SIGNIFICANT CHANGE UP
RSV RNA SPEC QL NAA+PROBE: SIGNIFICANT CHANGE UP
RV+EV RNA SPEC QL NAA+PROBE: SIGNIFICANT CHANGE UP
SARS-COV-2 RNA SPEC QL NAA+PROBE: SIGNIFICANT CHANGE UP
SARS-COV-2 RNA SPEC QL NAA+PROBE: SIGNIFICANT CHANGE UP
SODIUM SERPL-SCNC: 133 MMOL/L — LOW (ref 135–145)
SODIUM SERPL-SCNC: 136 MMOL/L — SIGNIFICANT CHANGE UP (ref 135–145)
SOURCE RESPIRATORY: SIGNIFICANT CHANGE UP
SP GR SPEC: 1.09 — HIGH (ref 1–1.03)
TROPONIN T, HIGH SENSITIVITY RESULT: 32 NG/L — SIGNIFICANT CHANGE UP
TROPONIN T, HIGH SENSITIVITY RESULT: 35 NG/L — SIGNIFICANT CHANGE UP
UROBILINOGEN FLD QL: 0.2 MG/DL — SIGNIFICANT CHANGE UP (ref 0.2–1)
WBC # BLD: 32.01 K/UL — HIGH (ref 3.8–10.5)
WBC # FLD AUTO: 32.01 K/UL — HIGH (ref 3.8–10.5)

## 2025-04-19 PROCEDURE — 99285 EMERGENCY DEPT VISIT HI MDM: CPT

## 2025-04-19 PROCEDURE — 74177 CT ABD & PELVIS W/CONTRAST: CPT | Mod: 26

## 2025-04-19 PROCEDURE — 99223 1ST HOSP IP/OBS HIGH 75: CPT | Mod: GC

## 2025-04-19 PROCEDURE — 71275 CT ANGIOGRAPHY CHEST: CPT | Mod: 26

## 2025-04-19 PROCEDURE — 71045 X-RAY EXAM CHEST 1 VIEW: CPT | Mod: 26

## 2025-04-19 RX ORDER — AZITHROMYCIN 250 MG
500 CAPSULE ORAL ONCE
Refills: 0 | Status: COMPLETED | OUTPATIENT
Start: 2025-04-19 | End: 2025-04-19

## 2025-04-19 RX ORDER — DEXTROMETHORPHAN HBR, GUAIFENESIN 200 MG/10ML
1200 LIQUID ORAL EVERY 12 HOURS
Refills: 0 | Status: DISCONTINUED | OUTPATIENT
Start: 2025-04-19 | End: 2025-04-29

## 2025-04-19 RX ORDER — VANCOMYCIN HCL IN 5 % DEXTROSE 1.5G/250ML
1000 PLASTIC BAG, INJECTION (ML) INTRAVENOUS EVERY 24 HOURS
Refills: 0 | Status: DISCONTINUED | OUTPATIENT
Start: 2025-04-19 | End: 2025-04-19

## 2025-04-19 RX ORDER — CEFTRIAXONE 500 MG/1
1000 INJECTION, POWDER, FOR SOLUTION INTRAMUSCULAR; INTRAVENOUS ONCE
Refills: 0 | Status: COMPLETED | OUTPATIENT
Start: 2025-04-19 | End: 2025-04-19

## 2025-04-19 RX ORDER — LEVALBUTEROL HYDROCHLORIDE 1.25 MG/3ML
0.63 SOLUTION RESPIRATORY (INHALATION) EVERY 6 HOURS
Refills: 0 | Status: DISCONTINUED | OUTPATIENT
Start: 2025-04-19 | End: 2025-04-21

## 2025-04-19 RX ORDER — OXYCODONE HYDROCHLORIDE 30 MG/1
2.5 TABLET ORAL EVERY 6 HOURS
Refills: 0 | Status: DISCONTINUED | OUTPATIENT
Start: 2025-04-19 | End: 2025-04-20

## 2025-04-19 RX ORDER — SODIUM CHLORIDE 9 G/1000ML
500 INJECTION, SOLUTION INTRAVENOUS
Refills: 0 | Status: DISCONTINUED | OUTPATIENT
Start: 2025-04-19 | End: 2025-04-20

## 2025-04-19 RX ORDER — MIDODRINE HYDROCHLORIDE 5 MG/1
10 TABLET ORAL ONCE
Refills: 0 | Status: COMPLETED | OUTPATIENT
Start: 2025-04-19 | End: 2025-04-19

## 2025-04-19 RX ORDER — ACETAMINOPHEN 500 MG/5ML
650 LIQUID (ML) ORAL EVERY 6 HOURS
Refills: 0 | Status: DISCONTINUED | OUTPATIENT
Start: 2025-04-19 | End: 2025-04-29

## 2025-04-19 RX ORDER — HEPARIN SODIUM 1000 [USP'U]/ML
5000 INJECTION INTRAVENOUS; SUBCUTANEOUS EVERY 8 HOURS
Refills: 0 | Status: DISCONTINUED | OUTPATIENT
Start: 2025-04-19 | End: 2025-04-19

## 2025-04-19 RX ORDER — IPRATROPIUM BROMIDE AND ALBUTEROL SULFATE .5; 2.5 MG/3ML; MG/3ML
3 SOLUTION RESPIRATORY (INHALATION) ONCE
Refills: 0 | Status: COMPLETED | OUTPATIENT
Start: 2025-04-19 | End: 2025-04-19

## 2025-04-19 RX ORDER — HYDROCHLOROTHIAZIDE 50 MG/1
12.5 TABLET ORAL
Refills: 0 | DISCHARGE

## 2025-04-19 RX ORDER — PREDNISONE 20 MG/1
40 TABLET ORAL DAILY
Refills: 0 | Status: DISCONTINUED | OUTPATIENT
Start: 2025-04-19 | End: 2025-04-21

## 2025-04-19 RX ORDER — ROSUVASTATIN CALCIUM 20 MG/1
10 TABLET, FILM COATED ORAL AT BEDTIME
Refills: 0 | Status: DISCONTINUED | OUTPATIENT
Start: 2025-04-19 | End: 2025-04-29

## 2025-04-19 RX ORDER — ASPIRIN 325 MG
81 TABLET ORAL DAILY
Refills: 0 | Status: DISCONTINUED | OUTPATIENT
Start: 2025-04-19 | End: 2025-04-29

## 2025-04-19 RX ORDER — CEFEPIME 2 G/20ML
2000 INJECTION, POWDER, FOR SOLUTION INTRAVENOUS EVERY 12 HOURS
Refills: 0 | Status: DISCONTINUED | OUTPATIENT
Start: 2025-04-19 | End: 2025-04-20

## 2025-04-19 RX ORDER — BENZONATATE 100 MG
100 CAPSULE ORAL THREE TIMES A DAY
Refills: 0 | Status: DISCONTINUED | OUTPATIENT
Start: 2025-04-19 | End: 2025-04-29

## 2025-04-19 RX ORDER — OXYCODONE HYDROCHLORIDE 30 MG/1
5 TABLET ORAL EVERY 6 HOURS
Refills: 0 | Status: DISCONTINUED | OUTPATIENT
Start: 2025-04-19 | End: 2025-04-20

## 2025-04-19 RX ORDER — LIDOCAINE HYDROCHLORIDE 20 MG/ML
1 JELLY TOPICAL DAILY
Refills: 0 | Status: DISCONTINUED | OUTPATIENT
Start: 2025-04-19 | End: 2025-04-29

## 2025-04-19 RX ORDER — IPRATROPIUM BROMIDE AND ALBUTEROL SULFATE .5; 2.5 MG/3ML; MG/3ML
3 SOLUTION RESPIRATORY (INHALATION) EVERY 6 HOURS
Refills: 0 | Status: DISCONTINUED | OUTPATIENT
Start: 2025-04-19 | End: 2025-04-19

## 2025-04-19 RX ORDER — KETOROLAC TROMETHAMINE 30 MG/ML
15 INJECTION, SOLUTION INTRAMUSCULAR; INTRAVENOUS ONCE
Refills: 0 | Status: DISCONTINUED | OUTPATIENT
Start: 2025-04-19 | End: 2025-04-19

## 2025-04-19 RX ORDER — METHYLPREDNISOLONE ACETATE 80 MG/ML
125 INJECTION, SUSPENSION INTRA-ARTICULAR; INTRALESIONAL; INTRAMUSCULAR; SOFT TISSUE ONCE
Refills: 0 | Status: COMPLETED | OUTPATIENT
Start: 2025-04-19 | End: 2025-04-19

## 2025-04-19 RX ORDER — AZITHROMYCIN 250 MG
500 CAPSULE ORAL DAILY
Refills: 0 | Status: DISCONTINUED | OUTPATIENT
Start: 2025-04-19 | End: 2025-04-20

## 2025-04-19 RX ADMIN — IPRATROPIUM BROMIDE AND ALBUTEROL SULFATE 3 MILLILITER(S): .5; 2.5 SOLUTION RESPIRATORY (INHALATION) at 07:29

## 2025-04-19 RX ADMIN — Medication 250 MILLIGRAM(S): at 11:12

## 2025-04-19 RX ADMIN — CEFTRIAXONE 100 MILLIGRAM(S): 500 INJECTION, POWDER, FOR SOLUTION INTRAMUSCULAR; INTRAVENOUS at 09:07

## 2025-04-19 RX ADMIN — METHYLPREDNISOLONE ACETATE 125 MILLIGRAM(S): 80 INJECTION, SUSPENSION INTRA-ARTICULAR; INTRALESIONAL; INTRAMUSCULAR; SOFT TISSUE at 09:07

## 2025-04-19 RX ADMIN — Medication 81 MILLIGRAM(S): at 14:53

## 2025-04-19 RX ADMIN — Medication 1000 MILLILITER(S): at 11:11

## 2025-04-19 RX ADMIN — PREDNISONE 40 MILLIGRAM(S): 20 TABLET ORAL at 14:56

## 2025-04-19 RX ADMIN — OXYCODONE HYDROCHLORIDE 5 MILLIGRAM(S): 30 TABLET ORAL at 23:30

## 2025-04-19 RX ADMIN — OXYCODONE HYDROCHLORIDE 5 MILLIGRAM(S): 30 TABLET ORAL at 16:38

## 2025-04-19 RX ADMIN — Medication 250 MILLIGRAM(S): at 14:52

## 2025-04-19 RX ADMIN — Medication 1000 MILLILITER(S): at 12:00

## 2025-04-19 RX ADMIN — LEVALBUTEROL HYDROCHLORIDE 0.63 MILLIGRAM(S): 1.25 SOLUTION RESPIRATORY (INHALATION) at 18:09

## 2025-04-19 RX ADMIN — OXYCODONE HYDROCHLORIDE 5 MILLIGRAM(S): 30 TABLET ORAL at 22:38

## 2025-04-19 RX ADMIN — SODIUM CHLORIDE 500 MILLILITER(S): 9 INJECTION, SOLUTION INTRAVENOUS at 16:15

## 2025-04-19 RX ADMIN — Medication 1000 MILLILITER(S): at 08:18

## 2025-04-19 RX ADMIN — KETOROLAC TROMETHAMINE 15 MILLIGRAM(S): 30 INJECTION, SOLUTION INTRAMUSCULAR; INTRAVENOUS at 08:09

## 2025-04-19 RX ADMIN — Medication 4 MILLIGRAM(S): at 07:29

## 2025-04-19 RX ADMIN — ROSUVASTATIN CALCIUM 10 MILLIGRAM(S): 20 TABLET, FILM COATED ORAL at 23:05

## 2025-04-19 RX ADMIN — OXYCODONE HYDROCHLORIDE 5 MILLIGRAM(S): 30 TABLET ORAL at 17:40

## 2025-04-19 RX ADMIN — IPRATROPIUM BROMIDE AND ALBUTEROL SULFATE 3 MILLILITER(S): .5; 2.5 SOLUTION RESPIRATORY (INHALATION) at 07:27

## 2025-04-19 RX ADMIN — CEFEPIME 100 MILLIGRAM(S): 2 INJECTION, POWDER, FOR SOLUTION INTRAVENOUS at 14:12

## 2025-04-19 RX ADMIN — Medication 100 MILLIGRAM(S): at 18:08

## 2025-04-19 RX ADMIN — MIDODRINE HYDROCHLORIDE 10 MILLIGRAM(S): 5 TABLET ORAL at 14:14

## 2025-04-19 NOTE — H&P ADULT - PROBLEM SELECTOR PLAN 8
Patient with one episode of paroxysmal afib in the setting of taking albuterol inhaler frequently over the course of one day.  Per patient, no further episodes of afib recorded with ILR  Not on AC    PLAN:  - holding home metoprolol in the setting of hypotension - cw rosuvastatin 10mg

## 2025-04-19 NOTE — H&P ADULT - ASSESSMENT
Patient is a 78 y/o male h/o HTN, HLD, COPD, paroxysmal A-fib with loop recorder not on AC c/o chest pain, fatigue, shortness of breath for the last day. Patient meeting sepsis criteria with leukocytosis, hypotension, borderline bandemia, with a lactate, findings on CTAP notable for RLL pneumonia. Admitted to medicine for further management.  Patient is a 80 y/o male h/o HTN, HLD, COPD, paroxysmal A-fib with loop recorder not on AC c/o chest pain, fatigue, shortness of breath for the last day. Patient meeting sepsis criteria with leukocytosis, hypotension, borderline bandemia, with a lactate, findings on CTA PE notable for RLL pneumonia. Admitted to medicine for further management.

## 2025-04-19 NOTE — PATIENT PROFILE ADULT - FALL HARM RISK - HARM RISK INTERVENTIONS

## 2025-04-19 NOTE — H&P ADULT - NSHPREVIEWOFSYSTEMS_GEN_ALL_CORE
REVIEW OF SYSTEMS:    CONSTITUTIONAL: +chills  EYES/ENT: No visual changes;  No vertigo or throat pain   NECK: No pain or stiffness  RESPIRATORY: +cough, SOB  CARDIOVASCULAR: No chest pain or palpitations  GASTROINTESTINAL: No abdominal or epigastric pain. No nausea, vomiting, or hematemesis; No diarrhea or constipation. No melena or hematochezia.  GENITOURINARY: No dysuria, frequency or hematuria  NEUROLOGICAL: No numbness or weakness  SKIN: No itching, rashes  MUSCULOSKELETAL: No joint pain, muscle pain. REVIEW OF SYSTEMS:    CONSTITUTIONAL: +chills  EYES/ENT: No visual changes;  No vertigo or throat pain   NECK: No pain or stiffness  RESPIRATORY: +cough, SOB  CARDIOVASCULAR: +chest pain  GASTROINTESTINAL: No abdominal or epigastric pain. No nausea, vomiting, or hematemesis; No diarrhea or constipation. No melena or hematochezia.  GENITOURINARY: No dysuria, frequency or hematuria  NEUROLOGICAL: No numbness or weakness  SKIN: No itching, rashes  MUSCULOSKELETAL: No joint pain, muscle pain.

## 2025-04-19 NOTE — PROVIDER CONTACT NOTE (OTHER) - SITUATION
Patient's BP is 90/55 manually Patient's BP is 90/55 manually. Patient refused heparin injection as ordered. Teach back education provided about the indication and adverse effects of not complying to administration as ordered.

## 2025-04-19 NOTE — H&P ADULT - ATTENDING COMMENTS
79 y.o. M with PMH of HTN, HLD, COPD, paroxysmal afib (not on a/c due to patient choice) who is admitted with sepsis 2/2 RLL PNA.     #RLL PNA with sepsis:   -meets sepsis criteria with leukocytosis, tachypnea, and elevated lactate (end organ damage)  -hypotensive in ED. Bolus total of 2.5 L LR and midodrine 10 mg given  -can continue with Cefepime for now and azithromycin  -no risk factors for MRSA; hold vanco. Can obtain MRSA swab  -sputum cx, if able  -for pleuritic pain; standing Tylenol and PRN oxy    #COPD:   -has not had an exacerbation in multiple years  -increased dyspnea and O2 requirement,   -c/w azithromycin x 3 days  -prednisone 40 mg x 5 days  -xopenex as pt declines albuterol    #KEVIN:   -normal cr in 2022, unclear new baseline  -likely prerenal 2/2 sepsis and poor PO intake  -urine studies to be ordered    #HTN:  -hold due to hypotension    #Hyperbilirubinemia:   -mostly direct, likely Gilbert?    #Pancreatic cyst:   -o/p f/u and MRI    #DVT ppx:   -SCDs, pt refusing pharmacologic ppx

## 2025-04-19 NOTE — PROVIDER CONTACT NOTE (OTHER) - REASON
Patient's BP is 90/55 manually Patient's BP is 90/55 manually. Patient refused heparin injection as ordered.

## 2025-04-19 NOTE — H&P ADULT - PROBLEM SELECTOR PLAN 7
- cw rosuvastatin 10mg Patient takes HCTZ 12.5mg daily and metoprolol 12.5 every other day    PLAN:  - Hold home meds iso hypotension

## 2025-04-19 NOTE — H&P ADULT - NSHPPHYSICALEXAM_GEN_ALL_CORE
Physical Exam:   Constitutional: NAD, well-groomed, well-developed  HEENT: PERRLA, EOMI, no drainage or redness  Neck: supple,  No JVD  Respiratory: Breath Sounds equal & clear bilaterally to auscultation, no rales/rhonchi/wheezing, no accessory muscle use noted  Cardiovascular: Regular rate, regular rhythm, normal S1, S2; no murmurs or rub  Gastrointestinal: Soft, non-tender, non distended, + bowel sounds  Extremities: FOOTE x 4, no peripheral edema, no cyanosis, no clubbing. +periperal pulses.   Neurological: A+O x 3; speech clear and intact; no sensory, motor  deficits, normal reflexes. Nonfocal.   Skin: warm, dry, well perfused  Lines: Physical Exam:   Constitutional: NAD, well-groomed, well-developed  HEENT: PERRLA, EOMI, no drainage or redness  Neck: supple,  No JVD  Respiratory: Reduced breath sounds on RLL  Cardiovascular: Regular rate, regular rhythm, normal S1, S2; no murmurs or rub  Gastrointestinal: Soft, non-tender, non distended, + bowel sounds  Extremities: FOOTE x 4, no peripheral edema, no cyanosis, no clubbing. +peripheral pulses.   Neurological: A+O x 3; speech clear and intact; no sensory, motor  deficits, normal reflexes. Nonfocal.   Skin: warm, dry, well perfused

## 2025-04-19 NOTE — ED ADULT NURSE NOTE - OBJECTIVE STATEMENT
pt presents to ED A&04 ambulatory at baseline coming in complaining of SOB worsening with inspirations and right rib pain x1 days. hx of COPD not on home ox, afib, HTN, loop recorder.  pt tachypneic, appears uncomfortable. No complaints of chest pain, headache, nausea, dizziness, vomiting  SOB, fever, chills verbalized. 20GRAC in place, labs sent, NSR on cardiac monitor medicated as per order.

## 2025-04-19 NOTE — ED ADULT NURSE REASSESSMENT NOTE - NS ED NURSE REASSESS COMMENT FT1
report received from overnight RN Eugenio. A&Ox4. NAD. pt denies SOB, chest pain, dizziness, weakness, urinary symptoms, HA, n/v/d, fevers, chills, pain. respirations are even and unlabored. call bell at bedside. safety precautions maintained.

## 2025-04-19 NOTE — H&P ADULT - TIME BILLING
Review of laboratory data, radiology results, consultants' recommendations, documentation in [place], discussion with patient/house staff and interdisciplinary staff (such as , social workers, etc). Interventions were performed as documented above.

## 2025-04-19 NOTE — PATIENT PROFILE ADULT - NSPROPTRIGHTBILLOFRIGHTS_GEN_A_NUR
patient conducted a detailed discussion... I had a detailed discussion with the patient and/or guardian regarding the historical points, exam findings, and any diagnostic results supporting the discharge/admit diagnosis.

## 2025-04-19 NOTE — ED PROVIDER NOTE - CADM POA CENTRAL LINE
Post Acute Skilled Nursing Home Subsequent Visit Note    Date of Service: 9/20/2023  Location seen at: [facility]  Subacute / Skilled Need: Rehabilitation    PCP: Lauri Mederos MD   Patient Care Team:  Lauri Mederos MD as PCP - General (Family Practice)  Goldberg, Nathan E., OD as Referring Provider (Optometry)  [facility] as Dermatology  Freedom Retana DO as Post Acute Facility Provider: Physician (Family Practice)  Mari Esparza CNP as Post Acute Facility Provider: APC (Nurse Practitioner)  Seen by Berenice Johnson CNP   today    Hospital course - [facility] - 9/04 - 9/14/23     88 years old frail man, with a past medical history consistent with: CLL, CKD stage III, CAD, DM2 with long term use of Insulin, L lung nodule and glaucoma, presented to [facility] ER on 9/04/23 from [facility] after being unable to get out of bed due to severe weakness. Of note, the week prior, patient also presented to the ER s/p fall at home. Per family, he was in the process of being transferred from [facility] to long term at [place] due to hs increasing weakness when these events occurred. After admission, he was noted to cough with drinking water and also had issues keeping food down after swallowing it. He was noted to take a total of long acting Insulin a day, divided in 4 doses for his diabetes.   CT scan of the head obtained in ER revealed no intracranial injury but the presence of soft tissue injury along the top of the scalp, extending to the right; old insult to the undersurface of the frontal lobes bilaterally 2. CT scan of the cervical spine showed moderate cervical spondylosis C4-C7, worst at C4-5, no traumatic cervical spine injury. CT scan of the cervical spine revealed large lymphadenopathy along the cervical chains, these findings are suggestive of neoplasm - CLL.   CT scan of the chest showed airspace opacity in the left lower lobe with air bronchogram, most consistent with  bronchopneumonia/aspiration, additional bilateral patchy airspace opacities and groundglass infiltrates, likely representing additional inflammatory/infectious disease and bilateral solid and groundglass nodules measuring up to 0.8 cm.  Ct scan of the abdomen and pelvis was notable for severe distention of the bladder, no hydronephrosis, nonobstructive stones in the L kidney, mildly complex renal cysts, containing thin wall calcifications, consistent with Bosniak 2F lesions.  Patient was diagnosed with failure to thrive, was placed on thickened liquids due to aspiration and Palliative Care team was consulted and family signed a DNR document.   There was concern for underlying malignancy (CLL progression vs new primary lung cancer), as cause of progressive decline over ~9 months and last Oncology ([facility]) notes describing rapid decline as far back as 8/2022, felt not due to CLL and not candidate for treatment.   Completed a course of Amox/clav (9/4 - 9/6), followed by TMP/SMX (9/6 - 9/12).  Hospitalist advised against aggressive workup of lung nodule and possible CLL progression due to patient's age, condition, progressive decline, lack of candidacy for treatment and overall very poor candidate for physical rehabilitation.   Patient was getting ready for discharge to rehab for a trial per family's request, when hospital staff was notified of intermediate quanteferon result on 9/02 and patient was kept for additional work up: ordered for AFB x 3 and T- Spot TB blood test was NEGATIVE.   Patient also experienced bladder distension with urinary retention/ cannot exclude UTI and a Fountain catheter was placed. Urine culture revealed Morganella (R to ampicllin, cefazolin, nitrofurantoin, IR to amp/sulbactam)  Dr. Blanton (Urology) was placed on consult and he concluded that this was a chronic issue and suggested Fountain, Tamsulosin and potential voiding trial outpatient, given the fact that he is not a surgical candidate  due to decline.   Cody also experienced low-normal glucose and his intake was decreased throughout hospitalization and his scheduled Insulin was discontinued and recommended SSI 2:50>150 going forward.  Iron deficiency anemia was also present on labs, however his HGB remained stable, in the 8- 9 range and iron supplements were initiated.   He was eventually stabilized from a clinical standpoint and transferred to [place] [facility] on 9/15/23 for sub- acute rehab.      History of Present Illness:   9/20/23 visit    Patient was seen today sitting up in wheelchair, no acute distress.  Patient denies any chest pain, SOB, nausea, vomiting, diarrhea, constipation, or urinary issues.  Labs reviewed, WBCs remain elevated at 29, likely due to CLL.  Patient has no signs or symptoms of acute infection.  Does not have a great appetite, calorie count pending per staff.  Trying to clarify if patient is current with hospice or if he is graduated from hospice.  Per chart patient was with [facility].  Continues to have Fountain catheter draining clear yellow urine.  No other nursing concerns at this time.     Spoke with patient's son Manav.  He would like to see the results of the calorie count, to see how much his dad is truly eating or not eating.  Discussed G-tube, does not want one at this time.  Patient is currently not with hospice.  Would like to speak with Corwin Delaney at the care plan meeting tomorrow, if patient continues to decline, would like to consider hospice.    HISTORY  No past medical history on file.   reports that he has never smoked. He has never used smokeless tobacco. He reports that he does not currently use alcohol. He reports that he does not use drugs.  Past Surgical History:   Procedure Laterality Date   • Laparoscopy, cholecystectomy     • Remv 2nd cataract,corn-scler sectn Bilateral 2015     Family History   Problem Relation Age of Onset   • Cancer, Esophageal Mother    • Parkinsonism  Father    • Diabetes Father    • Degenerative Joint Disease Sister         Huip     History     Not marked as reviewed during this visit.        PROBLEM LIST:  Patient Active Problem List   Diagnosis   • Chronic lymphocytic leukemia (CMD)   • Dyslipidemia   • Seborrheic keratosis   • Stage 3a chronic kidney disease (CMD)   • Type 2 diabetes mellitus with stage 3 chronic kidney disease, with long-term current use of insulin (CMD)   • Coronary artery calcification seen on CAT scan   • Abnormal cardiovascular stress test   • Pulmonary nodule, left   • Bilateral impacted cerumen   • Bilateral leg edema   • Glaucoma of both eyes   • DNR (do not resuscitate)   • FTT (failure to thrive) in adult   • Severe protein-calorie malnutrition (CMD)   • Cachexia (CMD)   • MAHESH (acute kidney injury) (CMD)   • Iron deficiency anemia   • Urinary retention with incomplete bladder emptying   • Encounter for attention to other artificial openings of urinary tract (CMD)   • Oropharyngeal dysphagia     ADVANCE CARE PLANNING:    Location: [facility]  Advance care planning documents on file - no Patient would like assistance with completing or updating document    Advance care planning discussion offered to participants: Mari Esparza CNP, RUFINA Ruffin, manager, Patient and Power of  for Health Care (both daughter Aneta and son Manav) consented to discussion. Time spent on discussion was 18 minutes.  Discussed with both daughter and son the new lab results, in the context of his CLL and new L lung nodule and they agreed for patient to receive IV fluids to correct his acute kidney injury, however stated that they do not want to send the patient back to the hospital due to his failure to thrive and fragility and reiterated the fact that he is a DNR with comfort measures and were agreeable to a DNH (Do Not Hospitalize). Palliative Care was consulted in the hospital as well and also discussed with both daughter and son  potential Hospice alternatives and they were agreeable if patient does not improve with IV fluids and therapy, however not as of right now.   Code Status: Do Not Resuscitate           DEPRESSION SCREENING:  Recent PHQ 2/9 Score    PHQ 2:  PHQ 2 Score Adult PHQ 2 Score Adult PHQ 2 Interpretation Little interest or pleasure in activity?   9/17/2023  11:42 PM 0 No further screening needed 0       PHQ 9:    DEPRESSION ASSESSMENT/PLAN:  Depression screening is negative no further plan needed.    ALLERGIES:  Allergies as of 09/20/2023   • (No Known Allergies)     CURRENT MEDICATIONS:   Current Outpatient Medications   Medication Sig Dispense Refill   • tamsulosin (FLOMAX) 0.4 MG Cap Take 1 capsule by mouth at bedtime.     • polyethylene glycol (MIRALAX) 17 g packet Take 17 g by mouth daily. Stir and dissolve powder in any 4 to 8 ounces of beverage, then drink.     • famotidine (PEPCID) 20 MG tablet Take 20 mg by mouth daily.     • ferrous sulfate 325 (65 FE) MG tablet Take 325 mg by mouth daily (with breakfast).     • acetaminophen (TYLENOL) 325 MG tablet Take 650 mg by mouth every 6 hours as needed.     • Insulin Lispro, 1 Unit Dial, (HumaLOG KwikPen) 100 UNIT/ML pen-injector Inject 9 Units into the skin 4 times daily. Prime 2 units before each dose.  150-199, 1 Units;200-249, 3 Units;250-299, 5 Units;300-349, 7 Units;350-400, 9 Units;. Sliding Scale Insulin : Blood Sugar is 150.00 - 199.00 1 Units Blood Sugar is 200.00 - 249.00 3 Units Blood Sugar is 250.00 - 299.00 5 Units Blood Sugar is 300.00 - 349.00 7 Units Blood Sugar is 350.00 - 400.00 9 Units     • latanoprost (XALATAN) 0.005 % ophthalmic solution 1 drop nightly.       No current facility-administered medications for this visit.     Medications reviewed / reconciled: Yes.    BASELINE FUNCTIONAL STATUS:  Walker    CURRENT FUNCTIONAL STATUS:  Wheelchair    9/19: Bed mobility min/moderate transfers moderate ambulating 15 to 20 feet with rolling walker moderate  assist, dressing upper body moderate, lower body max assist, toileting max assist, bathing moderate assist, feeding set up    DIET:  Consistency: General, thickened liquids  Type: DM diet  Appetite: Poor    REVIEW OF SYSTEMS:  Review of Systems   Constitutional: Positive for activity change, appetite change, fatigue and unexpected weight change.   HENT: Negative.    Eyes: Negative.    Respiratory: Negative for cough, chest tightness and shortness of breath.    Cardiovascular: Negative for chest pain, palpitations and leg swelling.   Gastrointestinal: Negative for abdominal distention, abdominal pain and blood in stool.   Endocrine: Negative.    Genitourinary: Negative for dysuria, flank pain and hematuria.   Allergic/Immunologic: Negative.    Neurological: Positive for weakness. Negative for dizziness, light-headedness and headaches.   Hematological: Bruises/bleeds easily.   Psychiatric/Behavioral: Positive for confusion.     VITALS:  Visit Vitals  /57   Pulse 72   Temp 97.8 °F (36.6 °C)   Resp 18   SpO2 92%     PAIN SCORE:  Vitals:    09/20/23 0900   PainSc:  0     PHYSICAL ASSESSMENT:  Physical Exam  Constitutional:       General: He is not in acute distress.     Appearance: He is cachectic. He is ill-appearing. He is not toxic-appearing.   HENT:      Head: Normocephalic.      Right Ear: External ear normal.      Left Ear: External ear normal.      Nose: No congestion or rhinorrhea.      Mouth/Throat:      Mouth: Mucous membranes are moist.      Neck: Neck supple.   Eyes:      Conjunctiva/sclera: Conjunctivae normal.      Pupils: Pupils are equal, round, and reactive to light.   Cardiovascular:      Rate and Rhythm: Normal rate and regular rhythm.      Heart sounds:      No friction rub. No gallop.   Pulmonary:      Effort: Pulmonary effort is normal. No respiratory distress.   Chest:      Chest wall: No tenderness.   Abdominal:      General: Bowel sounds are normal. There is no distension.      Palpations:  Abdomen is soft.      Tenderness: There is no abdominal tenderness.   Genitourinary:     Comments: Fountain catheter draining dark yellow urine with some sediment  Musculoskeletal:         General: No swelling or tenderness.      Right lower leg: No edema.      Left lower leg: No edema.   Skin:     General: Skin is warm and dry.      Comments: Multiple scratches notes on bilateral legs   Neurological:      Mental Status: He is alert. Mental status is at baseline.   Psychiatric:         Mood and Affect: Mood normal.         Behavior: Behavior normal.       LABS:    9/18: WBCs 29.5 hemoglobin 9.8 hematocrit 31.1 platelets 184 Sodium 140 potassium 4.8 glucose 118 BUN 44 creatinine 1.31    9/15/2023 labs: WBC 23.28,, hemoglobin 10.8, hematocrit 34.1, platelet count 225, magnesium 2.2, phosphorus 5.2, sodium 137 potassium 5.1, chloride 104, carbon dioxide 27, glucose 145, BUN 60, creatinine 2.02, calcium 9.9, total protein 7.4, albumin 3.9, ALT 13, AST 13, alkaline phosphatase 89 and bilirubin total 0.36.    ASSESSMENT AND PLAN    Acute on chronic failure to thrive/ Severe protein- calorie malnutrition/ Cachexia   Palliative care on consult  Ongoing conversations with daughter and son regarding Advanced Directives   Dietitian on consult  3 day calorie count in progress     MAHESH on CKD Stage 3 (baseline creatinine around 1)-  Avoid nephrotoxic agents  Monitor BMP  Start 0.9% NS at 80 mL/ hr X 1 Liter   Improved  Creatinine 1.31, repeat BMP 9/21    Chronic CLL, not a candidate for treatment per Oncology notes from 2022  Palliative care approach  Monitor CBC results  Evaluate for potential s/s of infection  Repeat CBC 9/21    L lung opacity, cannot exclude aspiration pneumonia, cannot exclude new primary lung cancer, biopsy needed to confirm  Monitor oxygen saturation every shift  Palliative care approach at this time per family  Aspiration precautions in place    Dysphagia, on thickened liquids  ST on consult  Aspiration  precautions     Leukocytosis, possibly due to CLL  Monitor for potential s/s of sepsis/ acute systemic infection   Evaluate  CBC results     UTI/ Bladder distention/ Urinary retention   Continue Tamsulosin (newly started)  Will leave with Fountain catheter and have outpatient voiding trial   Follow up with Urology if able     DM type 2 with nephropathy  Humalog sliding scale 4 times daily   Monitor for potential hyper/ hypoglycemic episodes     Iron deficiency anemia  Monitor weekly CBC results  Transfuse if HGB < 7  Hemoglobin 9.8    FOLLOW UP APPOINTMENTS:    PCP - 1 week post discharge  Urology (Dr. Blanton) - as indicated for voiding trials   Oncology - as indicated     DISCHARGE PLANNING: TBD, assisted living with home health versus hospice           Total time spent is more than 45 minutes, with more than 50% of the time spent in coordination of care, counseling, review of records and discussion of plan of care with the patient /staff /family.    Berenice Johnson, CNP       No

## 2025-04-19 NOTE — H&P ADULT - PROBLEM SELECTOR PLAN 3
Patient with history of COPD, nonsmoker, but worked as a  for many years  Does not require home O2, has an albuterol inhaler that he never uses    - cw prednisone 40mg x 5  - Duonebs Q6H  - cw vanc/cefepime Patient with history of COPD, nonsmoker, but worked as a  for many years  Does not require home O2, has an albuterol inhaler that he never uses    - cw prednisone 40mg x 5  - xopenex Q6H  - cw cefepime/azithromycin

## 2025-04-19 NOTE — H&P ADULT - PROBLEM SELECTOR PLAN 9
DVT ppx: heparin subq  Diet: DASH  Dispo: Pending PT eval Patient with one episode of paroxysmal afib in the setting of taking albuterol inhaler frequently over the course of one day.  Per patient, no further episodes of afib recorded with ILR  Not on AC    PLAN:  - holding home metoprolol in the setting of hypotension

## 2025-04-19 NOTE — H&P ADULT - PROBLEM SELECTOR PLAN 1
CTAP notable for RLL pneumonia  s/p CTX and azithromycin in the ED  RVP negative  CXR clear    PLAN:  - f/u bcx, urine strep, legionella, MRSA swab  - expand to full RVP  - cw Vanc/Cefepime (4/19 -   - cw prednisone 40mg x 5  - Duonebs Q6H  - for pleuritic chest pain can give tylenol for mild pain,   - cw fluids prn CTA PE notable for RLL pneumonia and left linear atelectasis  s/p CTX and azithromycin in the ED  RVP negative  CXR clear    PLAN:  - f/u bcx, urine strep, legionella, MRSA swab  - expand to full RVP  - cw Cefepime/Azithromycin  - cw prednisone 40mg x 5  - for pleuritic chest pain can give tylenol for mild pain, oxycodone 2.5mg for moderate pain, oxycodone 5mg for severe pain  - cw fluids prn

## 2025-04-19 NOTE — H&P ADULT - PROBLEM SELECTOR PLAN 6
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    - Follow up outpatient

## 2025-04-19 NOTE — ED ADULT NURSE REASSESSMENT NOTE - NS ED NURSE REASSESS COMMENT FT1
MAR made aware of Pts blood pressure. NAD. pt denies SOB, chest pain, dizziness, weakness, urinary symptoms, HA, n/v/d, fevers, chills, pain. respirations are even and unlabored. safety precautions maintained.

## 2025-04-19 NOTE — ED ADULT NURSE REASSESSMENT NOTE - NS ED NURSE REASSESS COMMENT FT1
BREAK RN: pt. remains A&Ox4, awake and resting. pt. endorsing 5/10 plain at this time in the R side of the chest, MD Maddy Aguero made aware, awaiting orders. no acute distress noted. respirations even and unlabored. NSR on cardiac monitor. VS as noted. due medications given as per orders. handoff to [facility] RN provided.

## 2025-04-19 NOTE — ED ADULT NURSE NOTE - CHIEF COMPLAINT QUOTE
C/o difficulty breathing x 1 day. endorsing chest pain, chills, body aches. wet cough noted. O2 saturation 89% on RA, improves to 94% on 4 L NC. appears uncomfortable. Hx COPD, Afib, HTN, loop recorder

## 2025-04-19 NOTE — H&P ADULT - PROBLEM SELECTOR PLAN 2
Meeting sepsis criteria with leukocytosis, hypotension, lactate, borderline bandemia  s/p 2.5L IVF in ED  s/p CTX and azithromycin in ED    PLAN:  - Fluids PRN  - f/u bcx  - antibiotics as above, will narrow pending infectious workup  - f/u UA and UCX

## 2025-04-19 NOTE — H&P ADULT - HISTORY OF PRESENT ILLNESS
Patient is a 78 y/o male h/o HTN, HLD, COPD, paroxysmal A-fib with loop recorder not on AC c/o chest pain, fatigue, shortness of breath for the last day. He began to feel chills and fatigue 2 days ago in the evening, and spent all day yesterday in bed. He did not eat or drink anything yesterday at all. Did not take his temperature at home.  Patient reports that the chest pain started last night and is pleuritic in nature, originated in his anterior right chest wall and then migrated to his lower right ribs near the mid-axillary line. He also reports increasing cough, not productive of sputum. Denies sick contacts, nausea, vomiting, diarrhea, dysuria, recent travel.     In the ED, patient was hypotensive to 84/52, tmax 99.8. Desatted to 89% on room air transiently in the morning, but successfully weaned off oxygen a few hours later. Labs notable for leukocytosis to 32.01, 9.3% bands, d-dimer 614, creatinine 1.8 (baseline ~1), bilirubin 3.8, pro-BNP 1450, lactate 3.9 > 3.4. RVP negative. CTAP with no PE, RLL consolidation with obstructed right lower lobe airways, likely pneumonia. EKG collected with NSR. Given azithromycin 500mg x1, CTX 1g x 1, toradol 15mg, solumedrol 125mg x 1, morphine 4mg IV x1, 2L NS, given duonebs x 2, and admitted to medicine.  Patient is a 80 y/o male h/o HTN, HLD, COPD, paroxysmal A-fib with loop recorder not on AC c/o chest pain, fatigue, shortness of breath for the last day. He began to feel chills and fatigue 2 days ago in the evening, and spent all day yesterday in bed. He did not eat or drink anything yesterday at all. Did not take his temperature at home.  Patient reports that the chest pain started last night and is pleuritic in nature, originated in his anterior right chest wall and then migrated to his lower right ribs near the mid-axillary line. He also reports increasing cough, not productive of sputum. Denies sick contacts, nausea, vomiting, diarrhea, dysuria, recent travel.     In the ED, patient was hypotensive to 84/52, tmax 99.8. Desatted to 89% on room air transiently in the morning, but successfully weaned off oxygen a few hours later. Labs notable for leukocytosis to 32.01, 9.3% bands, d-dimer 614, creatinine 1.8 (baseline ~1), bilirubin 3.8, pro-BNP 1450, lactate 3.9 > 3.4. RVP negative. CTA PE with no PE, RLL consolidation with obstructed right lower lobe airways, likely pneumonia. EKG collected with NSR. Given azithromycin 500mg x1, CTX 1g x 1, toradol 15mg, solumedrol 125mg x 1, morphine 4mg IV x1, 2L NS, given duonebs x 2, and admitted to medicine.

## 2025-04-19 NOTE — ED PROVIDER NOTE - OBJECTIVE STATEMENT
This is a 79-year-old male with a past medical history of COPD, hyperlipidemia, paroxysmal A-fib with a loop recorder not on any AC aspirin daily presented to the emergency room complaining having right chest pain along with shortness of breath that started last night and into this morning posterior.  States that the breathing got worse around 3 AM was having persistent shortness of breath and trouble breathing.  He states that yesterday he was having chills without any fevers he denies having any recent travel or cold like symptoms.  He denies having any headaches dizziness dysuria abdominal pain blurry vision recent travel.  He states that the last time he had a COPD exacerbation was 45 years ago where he was hospitalized.  He denies having any leg swelling or recent travel or history of blood clots.

## 2025-04-19 NOTE — ED PROVIDER NOTE - CLINICAL SUMMARY MEDICAL DECISION MAKING FREE TEXT BOX
Asif: Patient is 79-year-old gentleman who presents to the emergency department with pain in his right chest which has been going on for more than 24 hours.  Patient with a history of COPD OPD on home oxygen but without major flares in many years.  Patient has found himself more short of breath and with this ongoing pain.  It is pleuritic in nature.  On exam patient is wheezy.  Will start treatment of his COPD but will also get imaging considering pneumothorax.  If nothing is found on any imaging we may consider getting a study for PE.  Patient's response to treatment will dictate whether he gets admitted or discharged.

## 2025-04-19 NOTE — H&P ADULT - NSHPLABSRESULTS_GEN_ALL_CORE
LABS: When present labs, imaging, and ECG were personally reviewed                          15.1   32.01 )-----------( 215      ( 19 Apr 2025 07:45 )             45.3       04-19    133[L]  |  99  |  43[H]  ----------------------------<  151[H]  4.3   |  20[L]  |  1.90[H]    Ca    8.0[L]      19 Apr 2025 09:55  Mg     1.80     04-19    TPro  6.8  /  Alb  3.7  /  TBili  3.8[H]  /  DBili  x   /  AST  20  /  ALT  11  /  AlkPhos  70  04-19       LIVER FUNCTIONS - ( 19 Apr 2025 07:45 )  Alb: 3.7 g/dL / Pro: 6.8 g/dL / ALK PHOS: 70 U/L / ALT: 11 U/L / AST: 20 U/L / GGT: x                    Urinalysis Basic - ( 19 Apr 2025 09:55 )    Color: x / Appearance: x / SG: x / pH: x  Gluc: 151 mg/dL / Ketone: x  / Bili: x / Urobili: x   Blood: x / Protein: x / Nitrite: x   Leuk Esterase: x / RBC: x / WBC x   Sq Epi: x / Non Sq Epi: x / Bacteria: x            Lactate Trend            CAPILLARY BLOOD GLUCOSE                RADIOLOGY & ADDITIONAL TESTS:

## 2025-04-19 NOTE — PATIENT PROFILE ADULT - FUNCTIONAL SCREEN CURRENT LEVEL: SWALLOWING (IF SCORE 2 OR MORE FOR ANY ITEM, CONSULT REHAB SERVICES), MLM)
0 = swallows foods/liquids without difficulty [place] Counseling: I discussed with the patient the risks of tildrakizumab including but not limited to immunosuppression, malignancy, posterior leukoencephalopathy syndrome, and serious infections.  The patient understands that monitoring is required including a PPD at baseline and must alert us or the primary physician if symptoms of infection or other concerning signs are noted.

## 2025-04-19 NOTE — ED PROVIDER NOTE - ATTENDING APP SHARED VISIT CONTRIBUTION OF CARE
I performed a history and physical exam of the patient and discussed their management with the resident and /or advanced care provider. I reviewed the resident and /or ACP's note and agree with the documented findings and plan of care. My medical decision making and observations are found above.  Lungs decreased BS with wheezing, abd soft

## 2025-04-19 NOTE — H&P ADULT - PROBLEM SELECTOR PLAN 4
Likely pre-renal in the setting of poor PO intake and hypotension  s/p 2.5L IVF  Baseline creatinine ~1.0    - IVF prn  - Encourage PO intake  - Avoid nephrotoxic medications  - Monitor I/O  - CTM Likely pre-renal in the setting of poor PO intake and hypotension  s/p 2.5L IVF  Baseline creatinine ~1.0    - IVF prn  - Encourage PO intake  - F/u urine studies  - Avoid nephrotoxic medications  - Monitor I/O  - CTM

## 2025-04-19 NOTE — ED ADULT TRIAGE NOTE - CHIEF COMPLAINT QUOTE
C/o difficulty breathing x 1 day. endorsing chest pain, chills, body aches. wet cough noted. O2 saturation 89 on RA, improves to 94% on 4 L NC. appears uncomfortable. Hx COPD, Afib, HTN, loop recorder C/o difficulty breathing x 1 day. endorsing chest pain, chills, body aches. wet cough noted. O2 saturation 89% on RA, improves to 94% on 4 L NC. appears uncomfortable. Hx COPD, Afib, HTN, loop recorder

## 2025-04-19 NOTE — H&P ADULT - PROBLEM SELECTOR PLAN 5
Tbili 3.8  LFTs wnl   Possibly Gilbert's or other benign process    PLAN  - f/u fractionated bilirubin

## 2025-04-19 NOTE — ED ADULT NURSE REASSESSMENT NOTE - NS ED NURSE REASSESS COMMENT FT1
meal provided. NAD. pt denies SOB, chest pain, dizziness, weakness, urinary symptoms, HA, n/v/d, fevers, chills, pain. respirations are even and unlabored. safety precautions maintained.

## 2025-04-20 LAB
ALBUMIN SERPL ELPH-MCNC: 3.2 G/DL — LOW (ref 3.3–5)
ALP SERPL-CCNC: 87 U/L — SIGNIFICANT CHANGE UP (ref 40–120)
ALT FLD-CCNC: 15 U/L — SIGNIFICANT CHANGE UP (ref 4–41)
ANION GAP SERPL CALC-SCNC: 14 MMOL/L — SIGNIFICANT CHANGE UP (ref 7–14)
AST SERPL-CCNC: 24 U/L — SIGNIFICANT CHANGE UP (ref 4–40)
BASOPHILS # BLD AUTO: 0.16 K/UL — SIGNIFICANT CHANGE UP (ref 0–0.2)
BASOPHILS NFR BLD AUTO: 0.7 % — SIGNIFICANT CHANGE UP (ref 0–2)
BILIRUB SERPL-MCNC: 1.8 MG/DL — HIGH (ref 0.2–1.2)
BUN SERPL-MCNC: 43 MG/DL — HIGH (ref 7–23)
CALCIUM SERPL-MCNC: 9.2 MG/DL — SIGNIFICANT CHANGE UP (ref 8.4–10.5)
CHLORIDE SERPL-SCNC: 102 MMOL/L — SIGNIFICANT CHANGE UP (ref 98–107)
CO2 SERPL-SCNC: 21 MMOL/L — LOW (ref 22–31)
CREAT ?TM UR-MCNC: 195 MG/DL — SIGNIFICANT CHANGE UP
CREAT SERPL-MCNC: 1.35 MG/DL — HIGH (ref 0.5–1.3)
EGFR: 53 ML/MIN/1.73M2 — LOW
EGFR: 53 ML/MIN/1.73M2 — LOW
EOSINOPHIL # BLD AUTO: 0.06 K/UL — SIGNIFICANT CHANGE UP (ref 0–0.5)
EOSINOPHIL NFR BLD AUTO: 0.2 % — SIGNIFICANT CHANGE UP (ref 0–6)
GAS PNL BLDV: SIGNIFICANT CHANGE UP
GLUCOSE SERPL-MCNC: 153 MG/DL — HIGH (ref 70–99)
GRAM STN FLD: ABNORMAL
HCT VFR BLD CALC: 43.9 % — SIGNIFICANT CHANGE UP (ref 39–50)
HGB BLD-MCNC: 14.1 G/DL — SIGNIFICANT CHANGE UP (ref 13–17)
IANC: 20.69 K/UL — HIGH (ref 1.8–7.4)
IMM GRANULOCYTES NFR BLD AUTO: 11.2 % — HIGH (ref 0–0.9)
LEGIONELLA AG UR QL: NEGATIVE — SIGNIFICANT CHANGE UP
LYMPHOCYTES # BLD AUTO: 0.25 K/UL — LOW (ref 1–3.3)
LYMPHOCYTES # BLD AUTO: 1 % — LOW (ref 13–44)
MAGNESIUM SERPL-MCNC: 2.2 MG/DL — SIGNIFICANT CHANGE UP (ref 1.6–2.6)
MCHC RBC-ENTMCNC: 29 PG — SIGNIFICANT CHANGE UP (ref 27–34)
MCHC RBC-ENTMCNC: 32.1 G/DL — SIGNIFICANT CHANGE UP (ref 32–36)
MCV RBC AUTO: 90.1 FL — SIGNIFICANT CHANGE UP (ref 80–100)
METHOD TYPE: SIGNIFICANT CHANGE UP
MONOCYTES # BLD AUTO: 0.61 K/UL — SIGNIFICANT CHANGE UP (ref 0–0.9)
MONOCYTES NFR BLD AUTO: 2.5 % — SIGNIFICANT CHANGE UP (ref 2–14)
MRSA PCR RESULT.: SIGNIFICANT CHANGE UP
NEUTROPHILS # BLD AUTO: 20.69 K/UL — HIGH (ref 1.8–7.4)
NEUTROPHILS NFR BLD AUTO: 84.4 % — HIGH (ref 43–77)
NRBC # BLD AUTO: 0 K/UL — SIGNIFICANT CHANGE UP (ref 0–0)
NRBC # FLD: 0 K/UL — SIGNIFICANT CHANGE UP (ref 0–0)
NRBC BLD AUTO-RTO: 0 /100 WBCS — SIGNIFICANT CHANGE UP (ref 0–0)
OSMOLALITY UR: 765 MOSM/KG — SIGNIFICANT CHANGE UP (ref 50–1200)
PHOSPHATE SERPL-MCNC: 2.9 MG/DL — SIGNIFICANT CHANGE UP (ref 2.5–4.5)
PLATELET # BLD AUTO: 187 K/UL — SIGNIFICANT CHANGE UP (ref 150–400)
POTASSIUM SERPL-MCNC: 4 MMOL/L — SIGNIFICANT CHANGE UP (ref 3.5–5.3)
POTASSIUM SERPL-SCNC: 4 MMOL/L — SIGNIFICANT CHANGE UP (ref 3.5–5.3)
POTASSIUM UR-SCNC: 87.7 MMOL/L — SIGNIFICANT CHANGE UP
PROT ?TM UR-MCNC: 49 MG/DL — SIGNIFICANT CHANGE UP
PROT SERPL-MCNC: 6.6 G/DL — SIGNIFICANT CHANGE UP (ref 6–8.3)
PROT/CREAT UR-RTO: 0.2 RATIO — SIGNIFICANT CHANGE UP (ref 0–0.2)
RBC # BLD: 4.87 M/UL — SIGNIFICANT CHANGE UP (ref 4.2–5.8)
RBC # FLD: 14.4 % — SIGNIFICANT CHANGE UP (ref 10.3–14.5)
S AUREUS DNA NOSE QL NAA+PROBE: DETECTED
S PNEUM AG UR QL: NEGATIVE — SIGNIFICANT CHANGE UP
S PNEUM DNA BLD POS QL NAA+NON-PROBE: SIGNIFICANT CHANGE UP
SODIUM SERPL-SCNC: 137 MMOL/L — SIGNIFICANT CHANGE UP (ref 135–145)
SODIUM UR-SCNC: <20 MMOL/L — SIGNIFICANT CHANGE UP
SPECIMEN SOURCE: SIGNIFICANT CHANGE UP
SPECIMEN SOURCE: SIGNIFICANT CHANGE UP
UUN UR-MCNC: 1122 MG/DL — SIGNIFICANT CHANGE UP
WBC # BLD: 24.51 K/UL — HIGH (ref 3.8–10.5)
WBC # FLD AUTO: 24.51 K/UL — HIGH (ref 3.8–10.5)

## 2025-04-20 PROCEDURE — 99233 SBSQ HOSP IP/OBS HIGH 50: CPT | Mod: GC

## 2025-04-20 RX ORDER — ONDANSETRON HCL/PF 4 MG/2 ML
4 VIAL (ML) INJECTION ONCE
Refills: 0 | Status: COMPLETED | OUTPATIENT
Start: 2025-04-20 | End: 2025-04-20

## 2025-04-20 RX ORDER — HYDROMORPHONE/SOD CHLOR,ISO/PF 2 MG/10 ML
0.5 SYRINGE (ML) INJECTION ONCE
Refills: 0 | Status: DISCONTINUED | OUTPATIENT
Start: 2025-04-20 | End: 2025-04-20

## 2025-04-20 RX ORDER — ACETAMINOPHEN 500 MG/5ML
1000 LIQUID (ML) ORAL ONCE
Refills: 0 | Status: COMPLETED | OUTPATIENT
Start: 2025-04-20 | End: 2025-04-20

## 2025-04-20 RX ORDER — OXYCODONE HYDROCHLORIDE 30 MG/1
5 TABLET ORAL ONCE
Refills: 0 | Status: DISCONTINUED | OUTPATIENT
Start: 2025-04-20 | End: 2025-04-20

## 2025-04-20 RX ORDER — OXYCODONE HYDROCHLORIDE 30 MG/1
2.5 TABLET ORAL EVERY 6 HOURS
Refills: 0 | Status: DISCONTINUED | OUTPATIENT
Start: 2025-04-20 | End: 2025-04-21

## 2025-04-20 RX ORDER — METOCLOPRAMIDE HCL 10 MG
5 TABLET ORAL ONCE
Refills: 0 | Status: COMPLETED | OUTPATIENT
Start: 2025-04-20 | End: 2025-04-20

## 2025-04-20 RX ORDER — CEFTRIAXONE 500 MG/1
2000 INJECTION, POWDER, FOR SOLUTION INTRAMUSCULAR; INTRAVENOUS EVERY 24 HOURS
Refills: 0 | Status: COMPLETED | OUTPATIENT
Start: 2025-04-20 | End: 2025-04-27

## 2025-04-20 RX ORDER — OXYCODONE HYDROCHLORIDE 30 MG/1
2.5 TABLET ORAL ONCE
Refills: 0 | Status: DISCONTINUED | OUTPATIENT
Start: 2025-04-20 | End: 2025-04-20

## 2025-04-20 RX ORDER — SODIUM CHLORIDE 9 G/1000ML
1000 INJECTION, SOLUTION INTRAVENOUS
Refills: 0 | Status: DISCONTINUED | OUTPATIENT
Start: 2025-04-20 | End: 2025-04-21

## 2025-04-20 RX ADMIN — Medication 2 MILLIGRAM(S): at 00:55

## 2025-04-20 RX ADMIN — OXYCODONE HYDROCHLORIDE 5 MILLIGRAM(S): 30 TABLET ORAL at 12:20

## 2025-04-20 RX ADMIN — Medication 2 MILLIGRAM(S): at 01:10

## 2025-04-20 RX ADMIN — CEFTRIAXONE 100 MILLIGRAM(S): 500 INJECTION, POWDER, FOR SOLUTION INTRAMUSCULAR; INTRAVENOUS at 13:40

## 2025-04-20 RX ADMIN — OXYCODONE HYDROCHLORIDE 2.5 MILLIGRAM(S): 30 TABLET ORAL at 15:47

## 2025-04-20 RX ADMIN — SODIUM CHLORIDE 500 MILLILITER(S): 9 INJECTION, SOLUTION INTRAVENOUS at 01:57

## 2025-04-20 RX ADMIN — DEXTROMETHORPHAN HBR, GUAIFENESIN 1200 MILLIGRAM(S): 200 LIQUID ORAL at 06:58

## 2025-04-20 RX ADMIN — Medication 4 MILLIGRAM(S): at 05:43

## 2025-04-20 RX ADMIN — Medication 400 MILLIGRAM(S): at 22:57

## 2025-04-20 RX ADMIN — PREDNISONE 40 MILLIGRAM(S): 20 TABLET ORAL at 06:58

## 2025-04-20 RX ADMIN — OXYCODONE HYDROCHLORIDE 2.5 MILLIGRAM(S): 30 TABLET ORAL at 16:47

## 2025-04-20 RX ADMIN — OXYCODONE HYDROCHLORIDE 5 MILLIGRAM(S): 30 TABLET ORAL at 22:53

## 2025-04-20 RX ADMIN — Medication 5 MILLIGRAM(S): at 13:50

## 2025-04-20 RX ADMIN — OXYCODONE HYDROCHLORIDE 5 MILLIGRAM(S): 30 TABLET ORAL at 23:57

## 2025-04-20 RX ADMIN — Medication 0.5 MILLIGRAM(S): at 04:05

## 2025-04-20 RX ADMIN — Medication 0.5 MILLIGRAM(S): at 03:50

## 2025-04-20 RX ADMIN — OXYCODONE HYDROCHLORIDE 2.5 MILLIGRAM(S): 30 TABLET ORAL at 19:13

## 2025-04-20 RX ADMIN — ROSUVASTATIN CALCIUM 10 MILLIGRAM(S): 20 TABLET, FILM COATED ORAL at 22:52

## 2025-04-20 RX ADMIN — Medication 1 APPLICATION(S): at 07:36

## 2025-04-20 RX ADMIN — Medication 1000 MILLIGRAM(S): at 23:57

## 2025-04-20 RX ADMIN — OXYCODONE HYDROCHLORIDE 5 MILLIGRAM(S): 30 TABLET ORAL at 11:20

## 2025-04-20 RX ADMIN — SODIUM CHLORIDE 500 MILLILITER(S): 9 INJECTION, SOLUTION INTRAVENOUS at 13:40

## 2025-04-20 RX ADMIN — Medication 81 MILLIGRAM(S): at 15:46

## 2025-04-20 RX ADMIN — CEFEPIME 100 MILLIGRAM(S): 2 INJECTION, POWDER, FOR SOLUTION INTRAVENOUS at 03:10

## 2025-04-20 NOTE — PROGRESS NOTE ADULT - PROBLEM SELECTOR PLAN 2
Meeting sepsis criteria with leukocytosis, hypotension, lactate, borderline bandemia  s/p 2.5L IVF in ED  s/p CTX and azithromycin in ED    PLAN:  - Fluids PRN  - f/u bcx  - antibiotics as above, will narrow pending infectious workup  - f/u UA and UCX Meeting sepsis criteria with leukocytosis, hypotension, lactate, borderline bandemia  s/p 2.5L IVF in ED  s/p CTX and azithromycin in ED  bacteremic with strep pneumo pending sensitivities    PLAN:  - Fluids PRN  - antibiotics as above, will narrow pending infectious workup  - f/u UA and UCX

## 2025-04-20 NOTE — DISCHARGE NOTE PROVIDER - CARE PROVIDER_API CALL
Malcolm Coffey.  Internal Medicine  [address] [place] [id number]  Phone: [phone]  Fax: [phone]  Established Patient  Follow Up Time: 1 week

## 2025-04-20 NOTE — DISCHARGE NOTE PROVIDER - NSDCCPCAREPLAN_GEN_ALL_CORE_FT
PRINCIPAL DISCHARGE DIAGNOSIS  Diagnosis: Pneumonia  Assessment and Plan of Treatment: You came to the hospital because of shortness of breath, fatigue, and chills. We took a CT scan of your chest and found that you had pneumonia. You also had a bacteria growing in your blood called streptococcus pneumonia. We gave you IV antibiotics called ceftriaxone for the pneumonia. We also gave you steroids and inhalers to help with your shortness of breath and to help you get better more quickly. We controlled your pain while in the hospital with pain medication. Please take xxxx on discharge for your pneumonia and follow up with your PCP outpatient.     PRINCIPAL DISCHARGE DIAGNOSIS  Diagnosis: Pneumonia  Assessment and Plan of Treatment: You came to the hospital because of shortness of breath, fatigue, and chills. We took a CT scan of your chest and found that you had pneumonia. You also had a bacteria growing in your blood called streptococcus pneumonia. We gave you IV antibiotics called ceftriaxone for the pneumonia. We also gave you steroids and inhalers to help with your shortness of breath and to help you get better more quickly. We controlled your pain while in the hospital with pain medication. Please take levaquin 750mg daily until 5/4 on discharge for your pneumonia and follow up with your PCP outpatient.      SECONDARY DISCHARGE DIAGNOSES  Diagnosis: Afib  Assessment and Plan of Treatment: You have a prior history of afib. During this admission, you went back into afib with high heart rates. Your metoprolol was increased to 50mg daily to decrease heart rate and you were seen by EP (cardiologist).    Diagnosis: Hyperbilirubinemia  Assessment and Plan of Treatment:     Diagnosis: Pancreatic cyst  Assessment and Plan of Treatment:      PRINCIPAL DISCHARGE DIAGNOSIS  Diagnosis: Pneumonia  Assessment and Plan of Treatment: You came to the hospital because of shortness of breath, fatigue, and chills. We took a CT scan of your chest and found that you had pneumonia. You also had a bacteria growing in your blood called streptococcus pneumonia. We gave you IV antibiotics called ceftriaxone for the pneumonia. We also gave you steroids and inhalers to help with your shortness of breath and to help you get better more quickly. We controlled your pain while in the hospital with pain medication. Please take levaquin 750mg daily until 5/4 on discharge for your pneumonia and follow up with your PCP outpatient.      SECONDARY DISCHARGE DIAGNOSES  Diagnosis: Afib  Assessment and Plan of Treatment: You have a prior history of afib. During this admission, you went back into afib with high heart rates. Your metoprolol was increased to 50mg daily to decrease heart rate and you were seen by EP (cardiologist). Your loop recorder was investigated and it was found that your afib burden is 2.2%. Your heart rhythm was monitored and on 4/27, your heart rhythm went back into normal rhythm.   Please continue to take metoprolol 50mg daily. Please follow up with your cardiologist and EP.    Diagnosis: Hyperbilirubinemia  Assessment and Plan of Treatment: Your bilirubin was elevated on admission. It improved quickly. We suspect this is benign and due to your infection. Please follow up with your primary care doctor to have repeat lab work done.    Diagnosis: Hypertension  Assessment and Plan of Treatment: You have high blood pressure. Some on your medications were stopped during this hospitalization due to your infection. Please restart your hydrochlorothiazide at 12.5mg daily. If your leg swelling does not improve with restarting hydrochlorothiazide, please call your cardiologist.    Diagnosis: Pancreatic cyst  Assessment and Plan of Treatment: You were found to have a pancreatic cyst. This is a known cyst. Please follow up with your primary care doctor for monitoring of this cyst.

## 2025-04-20 NOTE — PROGRESS NOTE ADULT - PROBLEM SELECTOR PLAN 10
DVT ppx: heparin subq  Diet: DASH  Dispo: Pending PT eval DVT ppx: SCD (patient refusing medical dvt ppx)  Diet: DASH  Dispo: Pending PT eval

## 2025-04-20 NOTE — PHYSICAL THERAPY INITIAL EVALUATION ADULT - ADDITIONAL COMMENTS
Pt. reports he lives in a house with wife which has 3 steps to enter.  Prior to admission Pt was independent with all mobility and ambulated without an assistive device and ambulated to Muslim 1 mile most days. Pt denies any recent falls.     Pt. left comfortable laying semi supine in bed post PT Evaluation, no apparent distress, call bell in reach, all lines intact, +bed alarm. RN made aware of pt. status and participation in PT.

## 2025-04-20 NOTE — PROGRESS NOTE ADULT - PROBLEM SELECTOR PLAN 9
Patient with one episode of paroxysmal afib in the setting of taking albuterol inhaler frequently over the course of one day.  Per patient, no further episodes of afib recorded with ILR  Not on AC    PLAN:  - holding home metoprolol in the setting of hypotension

## 2025-04-20 NOTE — PHYSICAL THERAPY INITIAL EVALUATION ADULT - MANUAL MUSCLE TESTING RESULTS, REHAB EVAL
Bilateral UE muscle strength grossly at least 4/5 Throughout; Bilateral LE muscle strength grossly at least 4/5 throughout.

## 2025-04-20 NOTE — PROGRESS NOTE ADULT - PROBLEM SELECTOR PLAN 6
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    - Follow up outpatient

## 2025-04-20 NOTE — PROGRESS NOTE ADULT - SUBJECTIVE AND OBJECTIVE BOX
***************************************************************  aMddy Aguero, PGY-1  Internal Medicine   Available on [employer] TEAMS  ***************************************************************    IDALMIS MONTEZ  79y  MRN: [id number]    Patient is a 79y old  Male who presents with a chief complaint of Pneumonia (2025 13:31)      Interval/Overnight Events: no events ON.     Subjective: Pt seen and examined at bedside. Denies fever, CP, SOB, abn pain, N/V, dysuria. Tolerating diet.      MEDICATIONS  (STANDING):  aspirin enteric coated 81 milliGRAM(s) Oral daily  azithromycin   Tablet 500 milliGRAM(s) Oral daily  cefepime   IVPB 2000 milliGRAM(s) IV Intermittent every 12 hours  chlorhexidine 2% Cloths 1 Application(s) Topical <User Schedule>  guaiFENesin ER 1200 milliGRAM(s) Oral every 12 hours  lactated ringers. 500 milliLiter(s) (500 mL/Hr) IV Continuous <Continuous>  levalbuterol Inhalation 0.63 milliGRAM(s) Inhalation every 6 hours  lidocaine   4% Patch 1 Patch Transdermal daily  ondansetron Injectable 4 milliGRAM(s) IV Push once  predniSONE   Tablet 40 milliGRAM(s) Oral daily  rosuvastatin 10 milliGRAM(s) Oral at bedtime    MEDICATIONS  (PRN):  acetaminophen     Tablet .. 650 milliGRAM(s) Oral every 6 hours PRN Temp greater or equal to 38C (100.4F), Mild Pain (1 - 3)  benzonatate 100 milliGRAM(s) Oral three times a day PRN Cough  oxyCODONE    IR 2.5 milliGRAM(s) Oral every 6 hours PRN Moderate Pain (4 - 6)  oxyCODONE    IR 5 milliGRAM(s) Oral every 6 hours PRN Severe Pain (7 - 10)      Objective:    Vitals: Vital Signs Last 24 Hrs  T(C): 36.4 (25 @ 05:31), Max: 37.7 (25 @ 08:24)  T(F): 97.6 (25 @ :31), Max: 99.8 (25 @ 08:24)  HR: 88 (25 @ 05:31) (70 - 88)  BP: 138/87 (25 @ 05:31) (84/52 - 138/87)  BP(mean): 64 (25 @ 13:38) (63 - 72)  RR: 17 (25 @ :31) (16 - 28)  SpO2: 96% (25 @ :31) (94% - 100%)                I&O's Summary      PHYSICAL EXAM:  GENERAL: NAD  HEAD:  Atraumatic, Normocephalic  EYES: EOMI, conjunctiva and sclera clear  CHEST/LUNG: Clear to auscultation bilaterally; No rales, rhonchi, wheezing, or rubs  HEART: Regular rate and rhythm; No murmurs, rubs, or gallops  ABDOMEN: Soft, Nontender, Nondistended;   SKIN: No rashes or lesions  NERVOUS SYSTEM:  Alert & Oriented X3, no focal deficits    LABS:                        15.1   32.01 )-----------( 215      ( 2025 07:45 )             45.3         133[L]  |  99  |  43[H]  ----------------------------<  151[H]  4.3   |  20[L]  |  1.90[H]      136  |  99  |  38[H]  ----------------------------<  114[H]  4.3   |  24  |  1.80[H]    Ca    8.0[L]      2025 09:55  Ca    9.0      2025 07:45  Mg     1.80         TPro  x   /  Alb  x   /  TBili  3.2[H]  /  DBili  0.2  /  AST  x   /  ALT  x   /  AlkPhos  x     TPro  6.8  /  Alb  3.7  /  TBili  3.8[H]  /  DBili  x   /  AST  20  /  ALT  11  /  AlkPhos  70      CAPILLARY BLOOD GLUCOSE            Urinalysis Basic - ( 2025 15:48 )    Color: Dark Yellow / Appearance: Clear / S.087 / pH: x  Gluc: x / Ketone: Trace mg/dL  / Bili: Negative / Urobili: 0.2 mg/dL   Blood: x / Protein: 100 mg/dL / Nitrite: Negative   Leuk Esterase: Negative / RBC: 4 /HPF / WBC 8 /HPF   Sq Epi: x / Non Sq Epi: 2 /HPF / Bacteria: Negative /HPF          RADIOLOGY & ADDITIONAL TESTS:         ***************************************************************  Maddy Aguero, PGY-1  Internal Medicine   Available on [employer] TEAMS  ***************************************************************    IDALMIS MONTEZ  79y  MRN: [id number]    Patient is a 79y old  Male who presents with a chief complaint of Pneumonia (2025 13:31)      Interval/Overnight Events:  - in pain, rec. dilaudid and morphine  - vomited, given zofran  - still with pain in right rib    Subjective: Pt seen and examined at bedside. Reports nausea and persistent pain. no cough overnight. Is trying to eat some breakfast this morning.     MEDICATIONS  (STANDING):  aspirin enteric coated 81 milliGRAM(s) Oral daily  azithromycin   Tablet 500 milliGRAM(s) Oral daily  cefepime   IVPB 2000 milliGRAM(s) IV Intermittent every 12 hours  chlorhexidine 2% Cloths 1 Application(s) Topical <User Schedule>  guaiFENesin ER 1200 milliGRAM(s) Oral every 12 hours  lactated ringers. 500 milliLiter(s) (500 mL/Hr) IV Continuous <Continuous>  levalbuterol Inhalation 0.63 milliGRAM(s) Inhalation every 6 hours  lidocaine   4% Patch 1 Patch Transdermal daily  ondansetron Injectable 4 milliGRAM(s) IV Push once  predniSONE   Tablet 40 milliGRAM(s) Oral daily  rosuvastatin 10 milliGRAM(s) Oral at bedtime    MEDICATIONS  (PRN):  acetaminophen     Tablet .. 650 milliGRAM(s) Oral every 6 hours PRN Temp greater or equal to 38C (100.4F), Mild Pain (1 - 3)  benzonatate 100 milliGRAM(s) Oral three times a day PRN Cough  oxyCODONE    IR 2.5 milliGRAM(s) Oral every 6 hours PRN Moderate Pain (4 - 6)  oxyCODONE    IR 5 milliGRAM(s) Oral every 6 hours PRN Severe Pain (7 - 10)      Objective:    Vitals: Vital Signs Last 24 Hrs  T(C): 36.4 (25 @ 05:31), Max: 37.7 (25 @ 08:24)  T(F): 97.6 (25 @ :31), Max: 99.8 (25 @ 08:24)  HR: 88 (25:31) (70 - 88)  BP: 138/87 (25 @ 05:31) (84/52 - 138/87)  BP(mean): 64 (25 @ 13:38) (63 - 72)  RR: 17 (25 @ :31) (16 - 28)  SpO2: 96% (25 @ :31) (94% - 100%)                I&O's Summary      PHYSICAL EXAM:  Constitutional: NAD, well-groomed, well-developed  HEENT: PERRLA, EOMI, no drainage or redness  Neck: supple,  No JVD  Respiratory: Reduced breath sounds on RLL  Cardiovascular: Regular rate, regular rhythm, normal S1, S2; no murmurs or rub  Gastrointestinal: Soft, non-tender, non distended, + bowel sounds  Extremities: FOOTE x 4, no peripheral edema, no cyanosis, no clubbing. +peripheral pulses.   Neurological: A+O x 3; speech clear and intact; no sensory, motor  deficits, normal reflexes. Nonfocal.   Skin: warm, dry, well perfused    LABS:                        15.1   32.01 )-----------( 215      ( 2025 07:45 )             45.3         133[L]  |  99  |  43[H]  ----------------------------<  151[H]  4.3   |  20[L]  |  1.90[H]      136  |  99  |  38[H]  ----------------------------<  114[H]  4.3   |  24  |  1.80[H]    Ca    8.0[L]      2025 09:55  Ca    9.0      2025 07:45  Mg     1.80         TPro  x   /  Alb  x   /  TBili  3.2[H]  /  DBili  0.2  /  AST  x   /  ALT  x   /  AlkPhos  x     TPro  6.8  /  Alb  3.7  /  TBili  3.8[H]  /  DBili  x   /  AST  20  /  ALT  11  /  AlkPhos  70      CAPILLARY BLOOD GLUCOSE            Urinalysis Basic - ( 2025 15:48 )    Color: Dark Yellow / Appearance: Clear / S.087 / pH: x  Gluc: x / Ketone: Trace mg/dL  / Bili: Negative / Urobili: 0.2 mg/dL   Blood: x / Protein: 100 mg/dL / Nitrite: Negative   Leuk Esterase: Negative / RBC: 4 /HPF / WBC 8 /HPF   Sq Epi: x / Non Sq Epi: 2 /HPF / Bacteria: Negative /HPF          RADIOLOGY & ADDITIONAL TESTS:

## 2025-04-20 NOTE — PHYSICAL THERAPY INITIAL EVALUATION ADULT - GENERAL OBSERVATIONS, REHAB EVAL
Consult received, chart reviewed. Patient received in bed semi-supine, no apparent distress, HR 88. Pt. agreeable to participate in PT.

## 2025-04-20 NOTE — DISCHARGE NOTE PROVIDER - ATTENDING DISCHARGE PHYSICAL EXAMINATION:
.  VITAL SIGNS:  T(C): 36.8 (04-29-25 @ 11:36), Max: 37 (04-28-25 @ 20:40)  T(F): 98.3 (04-29-25 @ 11:36), Max: 98.6 (04-28-25 @ 20:40)  HR: 87 (04-29-25 @ 11:36) (76 - 87)  BP: 110/62 (04-29-25 @ 11:36) (108/62 - 124/63)  BP(mean): --  RR: 17 (04-29-25 @ 11:36) (17 - 18)  SpO2: 98% (04-29-25 @ 11:36) (96% - 98%)  Wt(kg): --    PHYSICAL EXAM:    Constitutional: Comfortable and resting in bed   Head: NC/AT  Eyes: PERRL, EOMI, anicteric sclera  ENT: no nasal discharge; uvula midline, no oropharyngeal erythema or exudates; MMM  Respiratory: CTA B/L; no W/R/R, no retractions  Cardiac: +S1/S2; RRR; no M/R/G; PMI non-displaced  Gastrointestinal: abdomen soft, NT/ND; no rebound or guarding; +BSx4  Back: spine midline, no bony tenderness or step-offs; no CVAT B/L  Extremities: WWP, no clubbing or cyanosis; 1+ peripheral edema bilaterally.   Musculoskeletal: NROM x4; no joint swelling, tenderness or erythema  Vascular: 2+ radial, femoral, DP/PT pulses B/L  Dermatologic: skin warm, dry and intact; no rashes, wounds, or scars  Neurologic: AAOx3; CNII-XII grossly intact; no focal deficits  Psychiatric: affect and characteristics of appearance, verbalizations, behaviors are appropriate    HCTZ held initially for low BP but will restart on d/c. Discussed with patient that if edema does not improve with resumption of diuretics to call PCP/cardiologist.

## 2025-04-20 NOTE — DISCHARGE NOTE PROVIDER - DISCHARGING ATTENDING PHYSICIAN:
Encounter Date: 5/19/2018       History     Chief Complaint   Patient presents with    Pelvic Pain     Pt has hx of ovarian cysts and she feels as though this is another rupture.      15-year-old female presents for evaluation of abdominal pain.  The pain is located in the lower abdomen and does not radiate.  The onset of pain began acutely this morning when she would awaken.  She denies any pain overnight.  She will take 400 mg of ibuprofen for pain. She has also describe nausea but no vomiting. Her menstrual cycle would start today.  Her last menstrual cycle was 1 month prior.  She denies any sexual activity at this time.  She denies any fever.  She does describe dysuria.  She denies any cough or congestion.  Of note mother states that she was diagnosed with a ruptured ovarian cysts 3 years ago that were present in a similar fashion.          Review of patient's allergies indicates:  No Known Allergies  No past medical history on file.  No past surgical history on file.  No family history on file.  Social History   Substance Use Topics    Smoking status: Not on file    Smokeless tobacco: Not on file    Alcohol use Not on file     Review of Systems   Constitutional: Positive for appetite change.   HENT: Negative.    Eyes: Negative.    Respiratory: Negative.    Cardiovascular: Negative.    Gastrointestinal: Positive for nausea. Negative for vomiting.   Genitourinary: Positive for dysuria.   Musculoskeletal: Negative.    Skin: Negative.    Neurological: Negative.        Physical Exam     Initial Vitals [05/19/18 0949]   BP Pulse Resp Temp SpO2   136/70 94 16 97.9 °F (36.6 °C) 96 %      MAP       92         Physical Exam    Vitals reviewed.  Constitutional: She appears well-developed and well-nourished.   HENT:   Head: Normocephalic.   Right Ear: External ear normal.   Left Ear: External ear normal.   Nose: Nose normal.   Mouth/Throat: Oropharynx is clear and moist.   Eyes: EOM are normal. Pupils are equal, round,  and reactive to light.   Neck: Normal range of motion.   Cardiovascular: Normal rate, regular rhythm, normal heart sounds and intact distal pulses. Exam reveals no gallop and no friction rub.    No murmur heard.  Pulmonary/Chest: Breath sounds normal. No respiratory distress. She has no wheezes. She has no rhonchi. She has no rales.   Abdominal: Soft.   Lower abdominal tenderness around the waistline.  No rebound, no guarding.   Musculoskeletal: Normal range of motion.   Neurological: She is alert and oriented to person, place, and time.   Skin: Skin is warm. Capillary refill takes less than 2 seconds.   Psychiatric: She has a normal mood and affect.         ED Course   Procedures  Labs Reviewed   URINALYSIS, REFLEX TO URINE CULTURE   CBC W/ AUTO DIFFERENTIAL   COMPREHENSIVE METABOLIC PANEL   C-REACTIVE PROTEIN   POCT URINE PREGNANCY        15-year-old female with acute onset of lower abdominal pain this morning.  Differential diagnosis includes dysmenorrhea, UTI, pyelonephritis, kidney stone, PID, ovarian torsion, ovarian cyst, ectopic pregnancy, other.    -UPT, UA, CBC, CMP, CRP  -KUB.  -ondansetron and Toradol for pain.                Unremarkable transabdominal pelvic ultrasound.    Patient's symptoms seemed to improve.    Home with supportive care, ibuprofen and Tylenol for pain. Follow up with OBGYN.           Clinical Impression:   Diagnoses of Abdominal pain and Pelvic pain were pertinent to this visit.                           Abdiel Hampton MD  05/24/18 [id number]     Tabatha Schshea

## 2025-04-20 NOTE — PROGRESS NOTE ADULT - PROBLEM SELECTOR PLAN 4
Likely pre-renal in the setting of poor PO intake and hypotension  s/p 2.5L IVF  Baseline creatinine ~1.0    - IVF prn  - Encourage PO intake  - F/u urine studies  - Avoid nephrotoxic medications  - Monitor I/O  - CTM Likely pre-renal in the setting of poor PO intake and hypotension  s/p 2.5L IVF  Baseline creatinine ~1.0  Urine studies indicate prerenal KEVIN    - IVF prn  - Encourage PO intake  - Avoid nephrotoxic medications  - Monitor I/O  - CTM Likely pre-renal in the setting of poor PO intake and hypotension  s/p 2.5L IVF  Baseline creatinine ~1.0  Urine studies indicate prerenal KEVIN    - 500cc LR today  - Encourage PO intake  - Avoid nephrotoxic medications  - Monitor I/O  - CTM

## 2025-04-20 NOTE — PHYSICAL THERAPY INITIAL EVALUATION ADULT - PERTINENT HX OF CURRENT PROBLEM, REHAB EVAL
Patient 80 y/o male with complaint of  chest pain, fatigue, shortness of breath for the last day. Patient meeting sepsis criteria with leukocytosis, hypotension, borderline bandemia, with a lactate, findings on CTA PE notable for RLL pneumonia. Admitted to medicine for further management.

## 2025-04-20 NOTE — PROGRESS NOTE ADULT - PROBLEM SELECTOR PLAN 3
Patient with history of COPD, nonsmoker, but worked as a  for many years  Does not require home O2, has an albuterol inhaler that he never uses    - cw prednisone 40mg x 5  - xopenex Q6H  - cw cefepime/azithromycin Patient with history of COPD, nonsmoker, but worked as a  for many years  Does not require home O2, has an albuterol inhaler that he never uses    - cw prednisone 40mg x 5  - xopenex Q6H  - cw Ceftriaxone  - f/u TTE

## 2025-04-20 NOTE — PROGRESS NOTE ADULT - PROBLEM SELECTOR PLAN 7
Patient takes HCTZ 12.5mg daily and metoprolol 12.5 every other day    PLAN:  - Hold home meds iso hypotension
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
## 2025-04-20 NOTE — DISCHARGE NOTE PROVIDER - NSDCMRMEDTOKEN_GEN_ALL_CORE_FT
aspirin 81 mg oral delayed release tablet: 1 tab(s) orally once a day  hydroCHLOROthiazide: 12.5 milligram(s) orally  rosuvastatin 10 mg oral tablet: 1 tab(s) orally once a day (at bedtime)  Toprol-XL 25 mg oral tablet, extended release: 0.5 tab(s) orally every other day   aspirin 81 mg oral delayed release tablet: 1 tab(s) orally once a day  hydroCHLOROthiazide: 12.5 milligram(s) orally  levoFLOXacin 750 mg oral tablet: 1 tab(s) orally once a day Start 4/30, last day 5/4  Metoprolol Succinate ER 50 mg oral tablet, extended release: 1 tab(s) orally once a day  Multiple Vitamins oral tablet: 1 tab(s) orally once a day  oxyCODONE 10 mg oral tablet: 1 tab(s) orally every 4 hours as needed for  severe pain MDD: 60mg  rosuvastatin 10 mg oral tablet: 1 tab(s) orally once a day (at bedtime)  Xopenex HFA 45 mcg/inh inhalation aerosol: 1 puff(s) inhaled every 6 hours as needed for wheezing

## 2025-04-20 NOTE — DISCHARGE NOTE PROVIDER - HOSPITAL COURSE
HPI:  Patient is a 80 y/o male h/o HTN, HLD, COPD, paroxysmal A-fib with loop recorder not on AC c/o chest pain, fatigue, shortness of breath for the last day. He began to feel chills and fatigue 2 days ago in the evening, and spent all day yesterday in bed. He did not eat or drink anything yesterday at all. Did not take his temperature at home.  Patient reports that the chest pain started last night and is pleuritic in nature, originated in his anterior right chest wall and then migrated to his lower right ribs near the mid-axillary line. He also reports increasing cough, not productive of sputum. Denies sick contacts, nausea, vomiting, diarrhea, dysuria, recent travel.     In the ED, patient was hypotensive to 84/52, tmax 99.8. Desatted to 89% on room air transiently in the morning, but successfully weaned off oxygen a few hours later. Labs notable for leukocytosis to 32.01, 9.3% bands, d-dimer 614, creatinine 1.8 (baseline ~1), bilirubin 3.8, pro-BNP 1450, lactate 3.9 > 3.4. RVP negative. CTA PE with no PE, RLL consolidation with obstructed right lower lobe airways, likely pneumonia. EKG collected with NSR. Given azithromycin 500mg x1, CTX 1g x 1, toradol 15mg, solumedrol 125mg x 1, morphine 4mg IV x1, 2L NS, given duonebs x 2, and admitted to medicine.  (19 Apr 2025 13:31)    Hospital Course:  The patient's blood cultures grew strep pneumoniae so his antibiotic regimen was adjusted to Ceftriaxone. He was given xopenex for shortness of breath as patient had an adverse reaction with albuterol and prefers not to take it if possible. He was also given a 5 day course of prednisone 40mg. He had an KEVIN on dmission which improved with IVF.     Important Medication Changes and Reason:    Active or Pending Issues Requiring Follow-up:  - Follow up with PCP    Advanced Directives:   [x] Full code  [ ] DNR  [ ] Hospice    Discharge Diagnoses:  Pneumonia       HPI:  Patient is a 78 y/o male h/o HTN, HLD, COPD, paroxysmal A-fib with loop recorder not on AC c/o chest pain, fatigue, shortness of breath for the last day. He began to feel chills and fatigue 2 days ago in the evening, and spent all day yesterday in bed. He did not eat or drink anything yesterday at all. Did not take his temperature at home.  Patient reports that the chest pain started last night and is pleuritic in nature, originated in his anterior right chest wall and then migrated to his lower right ribs near the mid-axillary line. He also reports increasing cough, not productive of sputum. Denies sick contacts, nausea, vomiting, diarrhea, dysuria, recent travel.     In the ED, patient was hypotensive to 84/52, tmax 99.8. Desatted to 89% on room air transiently in the morning, but successfully weaned off oxygen a few hours later. Labs notable for leukocytosis to 32.01, 9.3% bands, d-dimer 614, creatinine 1.8 (baseline ~1), bilirubin 3.8, pro-BNP 1450, lactate 3.9 > 3.4. RVP negative. CTA PE with no PE, RLL consolidation with obstructed right lower lobe airways, likely pneumonia. EKG collected with NSR. Given azithromycin 500mg x1, CTX 1g x 1, toradol 15mg, solumedrol 125mg x 1, morphine 4mg IV x1, 2L NS, given duonebs x 2, and admitted to medicine.  (19 Apr 2025 13:31)    Hospital Course:  The patient's blood cultures grew strep pneumoniae so his antibiotic regimen was adjusted to Ceftriaxone. He was given xopenex for shortness of breath as patient had an adverse reaction with albuterol and prefers not to take it if possible. .ID consulted for bacteremia and PNA, recommended 14 day total course and will complete course with PO Levaquin 750mg daily, last day 5/4/2025. He had an KEVIN on admission which improved with IVF. He had mild transaminitis that improved. Course complicated by afib with rates in 130s. EP was consulted, loop recorder interrogated. Afib burden 2.2%. Metoprolol was increased to 50mg daily. Patient CHADs-VaSC2 score 3, recommended Eliquis. After multiple risk and benefits discussion by both EP and primary team, patient declines anticoagulation. Patient afib resolved and self converted back to sinus on 4/27/25. Pain controlled with PO regimen. CT AP with known pancreatic cyst, to be followed up outpatient. Patient was stable for discharge on 4/29/2025.     Important Medication Changes and Reason:  - Increased metoprolol 50mg daily   - Started oxycodone 10mg q4h for 3 days     Active or Pending Issues Requiring Follow-up:  - Follow up with PCP  - Follow up with cardiology     Advanced Directives:   [x] Full code  [ ] DNR  [ ] Hospice    Discharge Diagnoses:  Pneumonia  Afib   Abnormal CT findings   Transaminitis        HPI:  Patient is a 78 y/o male h/o HTN, HLD, COPD, paroxysmal A-fib with loop recorder not on AC c/o chest pain, fatigue, shortness of breath for the last day. He began to feel chills and fatigue 2 days ago in the evening, and spent all day yesterday in bed. He did not eat or drink anything yesterday at all. Did not take his temperature at home.  Patient reports that the chest pain started last night and is pleuritic in nature, originated in his anterior right chest wall and then migrated to his lower right ribs near the mid-axillary line. He also reports increasing cough, not productive of sputum. Denies sick contacts, nausea, vomiting, diarrhea, dysuria, recent travel.     In the ED, patient was hypotensive to 84/52, tmax 99.8. Desatted to 89% on room air transiently in the morning, but successfully weaned off oxygen a few hours later. Labs notable for leukocytosis to 32.01, 9.3% bands, d-dimer 614, creatinine 1.8 (baseline ~1), bilirubin 3.8, pro-BNP 1450, lactate 3.9 > 3.4. RVP negative. CTA PE with no PE, RLL consolidation with obstructed right lower lobe airways, likely pneumonia. EKG collected with NSR. Given azithromycin 500mg x1, CTX 1g x 1, toradol 15mg, solumedrol 125mg x 1, morphine 4mg IV x1, 2L NS, given duonebs x 2, and admitted to medicine.  (19 Apr 2025 13:31)    Hospital Course:  The patient's blood cultures grew strep pneumoniae so his antibiotic regimen was adjusted to Ceftriaxone. He was given xopenex for shortness of breath as patient had an adverse reaction with albuterol and prefers not to take it if possible. .ID consulted for bacteremia and PNA, recommended 14 day total course and will complete course with PO Levaquin 750mg daily, last day 5/4/2025. He had an KEVIN on admission which improved with IVF. He had mild transaminitis that improved. Course complicated by afib with rates in 130s. EP was consulted, loop recorder interrogated. Afib burden 2.2%. Metoprolol was increased to 50mg daily. Patient CHADs-VaSC2 score 3, recommended Eliquis. After multiple risk and benefits discussion by both EP and primary team, patient declines anticoagulation. Patient afib resolved and self converted back to sinus on 4/27/25. Pain controlled with PO regimen. CT AP with known pancreatic cyst, to be followed up outpatient. Patient was stable for discharge on 4/29/2025.     Important Medication Changes and Reason:  - Increased metoprolol 50mg daily   - Started oxycodone 10mg q4h for 3 days   - Restart home HCTZ 12.5mg     Active or Pending Issues Requiring Follow-up:  - Follow up with PCP  - Follow up with cardiology   - Follow up with EP    Advanced Directives:   [x] Full code  [ ] DNR  [ ] Hospice    Discharge Diagnoses:  Pneumonia  Afib   Abnormal CT findings   Hyperbilirubinemia  HTN

## 2025-04-20 NOTE — PROGRESS NOTE ADULT - PROBLEM SELECTOR PLAN 1
CTA PE notable for RLL pneumonia and left linear atelectasis  s/p CTX and azithromycin in the ED  RVP negative  CXR clear    PLAN:  - f/u bcx, urine strep, legionella, MRSA swab  - expand to full RVP  - cw Cefepime/Azithromycin  - cw prednisone 40mg x 5  - for pleuritic chest pain can give tylenol for mild pain, oxycodone 2.5mg for moderate pain, oxycodone 5mg for severe pain  - cw fluids prn CTA PE notable for RLL pneumonia and left linear atelectasis  s/p CTX and azithromycin in the ED  full RVP negative  CXR clear  bcx strep pneumo    PLAN:  - f/u urine strep, legionella, MRSA swab  - can de-escalate to ceftriaxone given bcx with strep pneumo (pending sensitivities)  - cw prednisone 40mg x 5  - for pleuritic chest pain can give tylenol for mild pain, oxycodone 2.5mg for moderate pain, oxycodone 5mg for severe pain  - cw fluids prn CTA PE notable for RLL pneumonia and left linear atelectasis  s/p CTX and azithromycin in the ED  full RVP negative  CXR clear  bcx strep pneumo pending sensitivities    PLAN:  - f/u urine strep, legionella, MRSA swab  - can de-escalate to ceftriaxone given bcx with strep pneumo (pending sensitivities)  - cw prednisone 40mg x 5  - for pleuritic chest pain can give oral tylenol for mild pain, IV tylenol for moderate pain (cannot keep in as prn), oxycodone 2.5mg for severe pain  - give additional 500cc today given poor PO intake

## 2025-04-20 NOTE — PROGRESS NOTE ADULT - PROBLEM SELECTOR PLAN 5
Tbili 3.8  LFTs wnl   Possibly Gilbert's or other benign process    PLAN  - f/u fractionated bilirubin Tbili 3.8  LFTs wnl   Possibly Gilbert's or other benign process    PLAN  - downtrending

## 2025-04-20 NOTE — PROGRESS NOTE ADULT - ASSESSMENT
Patient is a 80 y/o male h/o HTN, HLD, COPD, paroxysmal A-fib with loop recorder not on AC c/o chest pain, fatigue, shortness of breath for the last day. Patient meeting sepsis criteria with leukocytosis, hypotension, borderline bandemia, with a lactate, findings on CTA PE notable for RLL pneumonia. Admitted to medicine for further management.

## 2025-04-20 NOTE — DISCHARGE NOTE PROVIDER - NSDCFUSCHEDAPPT_GEN_ALL_CORE_FT
[facility]  [facility]  [phone]  Scheduled Appointment: 05/22/2025

## 2025-04-20 NOTE — DISCHARGE NOTE PROVIDER - NSDCCPTREATMENT_GEN_ALL_CORE_FT
PRINCIPAL PROCEDURE  Procedure: CT chest w con  Findings and Treatment: No pulmonary embolism.  Right lower lobe consolidation with obstructed right lower lobe airways,   likely pneumonia, possibly secondary to aspiration.  Uncinate process pancreatic cyst measuring 3.1 cm, increased in size   since 2017.        SECONDARY PROCEDURE  Procedure: Chest xray, PA & lateral  Findings and Treatment: IMPRESSION: No focal consolidations.       PRINCIPAL PROCEDURE  Procedure: CT chest w con  Findings and Treatment: No pulmonary embolism.  Right lower lobe consolidation with obstructed right lower lobe airways,   likely pneumonia, possibly secondary to aspiration.  Uncinate process pancreatic cyst measuring 3.1 cm, increased in size   since 2017.        SECONDARY PROCEDURE  Procedure: Chest xray, PA & lateral  Findings and Treatment: IMPRESSION: No focal consolidations.      Procedure: Complete transthoracic echocardiography (TTE)  Findings and Treatment: CONCLUSIONS:      1. Left ventricular cavity is normal in size. Left ventricular wall thickness is normal. Left ventricular systolic function is normal with anejection fraction of 64 % by Remy's method of disks. There are no regional wall motion abnormalities seen.   2. Normal right ventricular cavity size and normal right ventricular systolic function. Tricuspid annular plane systolic excursion (TAPSE)is 2.1 cm (normal >=1.7 cm).   3. Structurally normal mitral valve with normal leaflet excursion. There is calcification of the mitral valve annulus. There is mild to moderate mitral regurgitation.   4. The aortic valve appears trileaflet with normalsystolic excursion. There is calcification of the aortic valve leaflets. There is mild aortic regurgitation.    Procedure: CT abdomen pelvis  Findings and Treatment: IMPRESSION:  Right lower lobe heterogeneous consolidation, mildly decreased compared   to recent prior 4/19/2025, likely combination of pneumonia and   atelectasis.  Small right pleural effusion, new.  Pancreatic uncinate process cystic lesion, 3.7 cm,increased compared to   prior 2017. Consider further evaluation with contrast-enhanced MRI/MRCP.    Procedure: MR lumbar spine wo/w con  Findings and Treatment: Loss of the normal lumbar lordosis is seen  Mild scoliosis is seen.  Disc desiccation is seen involving the L1-2 through L5-S1 levels with   evidence of Schmorl's node seen involving the inferior endplate of L2.   These findings are secondary to chronic degenerative change  T12-L1: Normal  L1-2: Mild disc bulge and bilateral hypertrophic facet joint change. Mild   to moderate narrowing of the spinal canal. Mild narrowing of the right   neural foramen mild to moderate narrowing of the left neural foramen  L2-3: Disc bulge and bilateral hypertrophic facet joint change.   Hypertrophic change is seen involving both ligamentum flavum. Moderate   narrowing of the spinal canal. Moderate narrowing of the left neural   foramen.  L3-4: Disc bulge and bilateral hypertrophic facet joint change.   Hypertrophic change both ligamentum flavum. Moderate to severe narrowing   of the spinal canal. Mild to moderate narrowing of the left neural foramen  L4-5: Disc bulge and bilateral hypertrophic facet joint changes.   Hypertrophic seen involving both ligamentum flavum. Moderate narrowing of   spinal canal. Moderate narrowing of both neural foramen  L5-S1: Disc bulge and small central disc protrusion is identified. Mild   mild narrowing of the spinal canal. 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 This cyst on the right   side measures approximately 0.9 cm and the cyst on the left side measures   approximately 1.3 cm.  Impression of the paraspinal soft tissues appear normal.  Right-sided renal cyst is seen.  IMPRESSION: Degenerative changes as described above.

## 2025-04-20 NOTE — DISCHARGE NOTE PROVIDER - NSFOLLOWUPCLINICS_GEN_ALL_ED_FT
[facility] Infectious Disease  Infectious Disease  [address], [place] [address]  Phone: [phone]  Fax:   Follow Up Time: 2 weeks     [facility] - Infectious Disease  Infectious Disease  [address], [place] [address]  Phone: [phone]  Fax:   Follow Up Time: 2 weeks    A Cardiologist  Cardiology  .  [place]   Phone:   Fax:   Follow Up Time: 2 weeks

## 2025-04-20 NOTE — DISCHARGE NOTE PROVIDER - NSDCFUADDAPPT_GEN_ALL_CORE_FT
APPTS ARE READY TO BE MADE: [ ] YES    Best Family or Patient Contact (if needed):    Additional Information about above appointments (if needed):    1: PCP  2: Infectious Disease  3:     Other comments or requests:

## 2025-04-21 ENCOUNTER — RESULT REVIEW (OUTPATIENT)
Age: 80
End: 2025-04-21

## 2025-04-21 LAB
-  CEFTRIAXONE (MENINGITIDIS): SIGNIFICANT CHANGE UP
-  CEFTRIAXONE (NON-MENINGITIDIS): SIGNIFICANT CHANGE UP
-  CLINDAMYCIN: SIGNIFICANT CHANGE UP
-  ERYTHROMYCIN: SIGNIFICANT CHANGE UP
-  LEVOFLOXACIN: SIGNIFICANT CHANGE UP
-  PENICILLIN (MENINGITIDIS): SIGNIFICANT CHANGE UP
-  PENICILLIN (NON-MENINGITIDIS): SIGNIFICANT CHANGE UP
-  PENICILLIN (ORAL PENICILLIN V): SIGNIFICANT CHANGE UP
-  TETRACYCLINE: SIGNIFICANT CHANGE UP
-  TRIMETHOPRIM/SULFAMETHOXAZOLE: SIGNIFICANT CHANGE UP
-  VANCOMYCIN: SIGNIFICANT CHANGE UP
ADD ON TEST-SPECIMEN IN LAB: SIGNIFICANT CHANGE UP
ALBUMIN SERPL ELPH-MCNC: 3 G/DL — LOW (ref 3.3–5)
ALP SERPL-CCNC: 89 U/L — SIGNIFICANT CHANGE UP (ref 40–120)
ALT FLD-CCNC: 17 U/L — SIGNIFICANT CHANGE UP (ref 4–41)
ANION GAP SERPL CALC-SCNC: 14 MMOL/L — SIGNIFICANT CHANGE UP (ref 7–14)
AST SERPL-CCNC: 25 U/L — SIGNIFICANT CHANGE UP (ref 4–40)
BASOPHILS # BLD AUTO: 0.09 K/UL — SIGNIFICANT CHANGE UP (ref 0–0.2)
BASOPHILS NFR BLD AUTO: 0.4 % — SIGNIFICANT CHANGE UP (ref 0–2)
BILIRUB SERPL-MCNC: 0.9 MG/DL — SIGNIFICANT CHANGE UP (ref 0.2–1.2)
BUN SERPL-MCNC: 34 MG/DL — HIGH (ref 7–23)
CALCIUM SERPL-MCNC: 9.4 MG/DL — SIGNIFICANT CHANGE UP (ref 8.4–10.5)
CHLORIDE SERPL-SCNC: 100 MMOL/L — SIGNIFICANT CHANGE UP (ref 98–107)
CO2 SERPL-SCNC: 23 MMOL/L — SIGNIFICANT CHANGE UP (ref 22–31)
CREAT SERPL-MCNC: 1.03 MG/DL — SIGNIFICANT CHANGE UP (ref 0.5–1.3)
CULTURE RESULTS: ABNORMAL
CULTURE RESULTS: ABNORMAL
EGFR: 74 ML/MIN/1.73M2 — SIGNIFICANT CHANGE UP
EGFR: 74 ML/MIN/1.73M2 — SIGNIFICANT CHANGE UP
EOSINOPHIL # BLD AUTO: 0 K/UL — SIGNIFICANT CHANGE UP (ref 0–0.5)
EOSINOPHIL NFR BLD AUTO: 0 % — SIGNIFICANT CHANGE UP (ref 0–6)
GAS PNL BLDV: SIGNIFICANT CHANGE UP
GLUCOSE SERPL-MCNC: 102 MG/DL — HIGH (ref 70–99)
HCT VFR BLD CALC: 42.5 % — SIGNIFICANT CHANGE UP (ref 39–50)
HGB BLD-MCNC: 14 G/DL — SIGNIFICANT CHANGE UP (ref 13–17)
IANC: 19.74 K/UL — HIGH (ref 1.8–7.4)
IMM GRANULOCYTES NFR BLD AUTO: 1 % — HIGH (ref 0–0.9)
LIDOCAIN IGE QN: 7 U/L — SIGNIFICANT CHANGE UP (ref 7–60)
LYMPHOCYTES # BLD AUTO: 0.25 K/UL — LOW (ref 1–3.3)
LYMPHOCYTES # BLD AUTO: 1.2 % — LOW (ref 13–44)
MAGNESIUM SERPL-MCNC: 2.1 MG/DL — SIGNIFICANT CHANGE UP (ref 1.6–2.6)
MCHC RBC-ENTMCNC: 28.9 PG — SIGNIFICANT CHANGE UP (ref 27–34)
MCHC RBC-ENTMCNC: 32.9 G/DL — SIGNIFICANT CHANGE UP (ref 32–36)
MCV RBC AUTO: 87.8 FL — SIGNIFICANT CHANGE UP (ref 80–100)
METHOD TYPE: SIGNIFICANT CHANGE UP
MONOCYTES # BLD AUTO: 0.77 K/UL — SIGNIFICANT CHANGE UP (ref 0–0.9)
MONOCYTES NFR BLD AUTO: 3.7 % — SIGNIFICANT CHANGE UP (ref 2–14)
NEUTROPHILS # BLD AUTO: 19.74 K/UL — HIGH (ref 1.8–7.4)
NEUTROPHILS NFR BLD AUTO: 93.7 % — HIGH (ref 43–77)
NRBC # BLD AUTO: 0 K/UL — SIGNIFICANT CHANGE UP (ref 0–0)
NRBC # FLD: 0 K/UL — SIGNIFICANT CHANGE UP (ref 0–0)
NRBC BLD AUTO-RTO: 0 /100 WBCS — SIGNIFICANT CHANGE UP (ref 0–0)
ORGANISM # SPEC MICROSCOPIC CNT: ABNORMAL
PHOSPHATE SERPL-MCNC: 2.8 MG/DL — SIGNIFICANT CHANGE UP (ref 2.5–4.5)
PLATELET # BLD AUTO: 175 K/UL — SIGNIFICANT CHANGE UP (ref 150–400)
POTASSIUM SERPL-MCNC: 4.6 MMOL/L — SIGNIFICANT CHANGE UP (ref 3.5–5.3)
POTASSIUM SERPL-SCNC: 4.6 MMOL/L — SIGNIFICANT CHANGE UP (ref 3.5–5.3)
PROT SERPL-MCNC: 6 G/DL — SIGNIFICANT CHANGE UP (ref 6–8.3)
RBC # BLD: 4.84 M/UL — SIGNIFICANT CHANGE UP (ref 4.2–5.8)
RBC # FLD: 14.2 % — SIGNIFICANT CHANGE UP (ref 10.3–14.5)
SODIUM SERPL-SCNC: 137 MMOL/L — SIGNIFICANT CHANGE UP (ref 135–145)
SPECIMEN SOURCE: SIGNIFICANT CHANGE UP
SPECIMEN SOURCE: SIGNIFICANT CHANGE UP
WBC # BLD: 21.05 K/UL — HIGH (ref 3.8–10.5)
WBC # FLD AUTO: 21.05 K/UL — HIGH (ref 3.8–10.5)

## 2025-04-21 PROCEDURE — 99233 SBSQ HOSP IP/OBS HIGH 50: CPT

## 2025-04-21 PROCEDURE — 99222 1ST HOSP IP/OBS MODERATE 55: CPT | Mod: GC

## 2025-04-21 PROCEDURE — G0545: CPT

## 2025-04-21 PROCEDURE — 93306 TTE W/DOPPLER COMPLETE: CPT | Mod: 26

## 2025-04-21 RX ORDER — OXYCODONE HYDROCHLORIDE 30 MG/1
2.5 TABLET ORAL EVERY 4 HOURS
Refills: 0 | Status: DISCONTINUED | OUTPATIENT
Start: 2025-04-21 | End: 2025-04-25

## 2025-04-21 RX ORDER — ONDANSETRON HCL/PF 4 MG/2 ML
4 VIAL (ML) INJECTION EVERY 6 HOURS
Refills: 0 | Status: DISCONTINUED | OUTPATIENT
Start: 2025-04-21 | End: 2025-04-29

## 2025-04-21 RX ORDER — METOPROLOL SUCCINATE 50 MG/1
12.5 TABLET, EXTENDED RELEASE ORAL DAILY
Refills: 0 | Status: DISCONTINUED | OUTPATIENT
Start: 2025-04-21 | End: 2025-04-24

## 2025-04-21 RX ORDER — B1/B2/B3/B5/B6/B12/VIT C/FOLIC 500-0.5 MG
1 TABLET ORAL DAILY
Refills: 0 | Status: DISCONTINUED | OUTPATIENT
Start: 2025-04-21 | End: 2025-04-29

## 2025-04-21 RX ORDER — OXYCODONE HYDROCHLORIDE 30 MG/1
5 TABLET ORAL EVERY 4 HOURS
Refills: 0 | Status: DISCONTINUED | OUTPATIENT
Start: 2025-04-21 | End: 2025-04-23

## 2025-04-21 RX ORDER — LEVALBUTEROL HYDROCHLORIDE 1.25 MG/3ML
1 SOLUTION RESPIRATORY (INHALATION) EVERY 6 HOURS
Refills: 0 | Status: DISCONTINUED | OUTPATIENT
Start: 2025-04-21 | End: 2025-04-29

## 2025-04-21 RX ORDER — MUPIROCIN CALCIUM 20 MG/G
1 CREAM TOPICAL
Refills: 0 | Status: DISCONTINUED | OUTPATIENT
Start: 2025-04-21 | End: 2025-04-29

## 2025-04-21 RX ORDER — ACETAMINOPHEN 500 MG/5ML
1000 LIQUID (ML) ORAL ONCE
Refills: 0 | Status: COMPLETED | OUTPATIENT
Start: 2025-04-21 | End: 2025-04-21

## 2025-04-21 RX ADMIN — OXYCODONE HYDROCHLORIDE 5 MILLIGRAM(S): 30 TABLET ORAL at 17:05

## 2025-04-21 RX ADMIN — PREDNISONE 40 MILLIGRAM(S): 20 TABLET ORAL at 05:09

## 2025-04-21 RX ADMIN — OXYCODONE HYDROCHLORIDE 5 MILLIGRAM(S): 30 TABLET ORAL at 13:10

## 2025-04-21 RX ADMIN — OXYCODONE HYDROCHLORIDE 5 MILLIGRAM(S): 30 TABLET ORAL at 12:37

## 2025-04-21 RX ADMIN — Medication 400 MILLIGRAM(S): at 08:45

## 2025-04-21 RX ADMIN — MUPIROCIN CALCIUM 1 APPLICATION(S): 20 CREAM TOPICAL at 18:16

## 2025-04-21 RX ADMIN — Medication 4 MILLIGRAM(S): at 14:10

## 2025-04-21 RX ADMIN — CEFTRIAXONE 100 MILLIGRAM(S): 500 INJECTION, POWDER, FOR SOLUTION INTRAMUSCULAR; INTRAVENOUS at 14:10

## 2025-04-21 RX ADMIN — DEXTROMETHORPHAN HBR, GUAIFENESIN 1200 MILLIGRAM(S): 200 LIQUID ORAL at 17:05

## 2025-04-21 RX ADMIN — Medication 81 MILLIGRAM(S): at 12:38

## 2025-04-21 RX ADMIN — LIDOCAINE HYDROCHLORIDE 1 PATCH: 20 JELLY TOPICAL at 12:37

## 2025-04-21 RX ADMIN — OXYCODONE HYDROCHLORIDE 5 MILLIGRAM(S): 30 TABLET ORAL at 17:35

## 2025-04-21 RX ADMIN — OXYCODONE HYDROCHLORIDE 5 MILLIGRAM(S): 30 TABLET ORAL at 22:45

## 2025-04-21 RX ADMIN — OXYCODONE HYDROCHLORIDE 5 MILLIGRAM(S): 30 TABLET ORAL at 21:50

## 2025-04-21 RX ADMIN — OXYCODONE HYDROCHLORIDE 2.5 MILLIGRAM(S): 30 TABLET ORAL at 06:09

## 2025-04-21 RX ADMIN — LIDOCAINE HYDROCHLORIDE 1 PATCH: 20 JELLY TOPICAL at 19:37

## 2025-04-21 RX ADMIN — Medication 1 APPLICATION(S): at 05:10

## 2025-04-21 RX ADMIN — LEVALBUTEROL HYDROCHLORIDE 1 PUFF(S): 1.25 SOLUTION RESPIRATORY (INHALATION) at 18:15

## 2025-04-21 RX ADMIN — Medication 1000 MILLIGRAM(S): at 09:15

## 2025-04-21 RX ADMIN — OXYCODONE HYDROCHLORIDE 2.5 MILLIGRAM(S): 30 TABLET ORAL at 05:09

## 2025-04-21 RX ADMIN — ROSUVASTATIN CALCIUM 10 MILLIGRAM(S): 20 TABLET, FILM COATED ORAL at 21:38

## 2025-04-21 NOTE — PROGRESS NOTE ADULT - PROBLEM SELECTOR PLAN 10
DVT ppx: SCD (patient refusing medical dvt ppx)  Diet: DASH  Dispo: Pending PT eval DVT ppx: SCD (patient refusing medical dvt ppx)  Diet: DASH  Dispo: No PT, pending culture sensitivities, repeat Bcx, and pain control

## 2025-04-21 NOTE — PROGRESS NOTE ADULT - SUBJECTIVE AND OBJECTIVE BOX
***************************************************************  Cammy Moreira, PGY1  Internal Medicine   Available on [employer] TEAMS  ***************************************************************    IDALMIS MONTEZ  79y  MRN: [id number]    Patient is a 79y old  Male who presents with a chief complaint of Pneumonia (20 Apr 2025 21:49)      Interval/Overnight Events: no events ON.     Subjective: Pt seen and examined at bedside. Denies fever, CP, SOB, abn pain, N/V, dysuria. Tolerating diet.      MEDICATIONS  (STANDING):  aspirin enteric coated 81 milliGRAM(s) Oral daily  cefTRIAXone   IVPB 2000 milliGRAM(s) IV Intermittent every 24 hours  chlorhexidine 2% Cloths 1 Application(s) Topical <User Schedule>  guaiFENesin ER 1200 milliGRAM(s) Oral every 12 hours  levalbuterol Inhalation 0.63 milliGRAM(s) Inhalation every 6 hours  lidocaine   4% Patch 1 Patch Transdermal daily  predniSONE   Tablet 40 milliGRAM(s) Oral daily  rosuvastatin 10 milliGRAM(s) Oral at bedtime    MEDICATIONS  (PRN):  acetaminophen     Tablet .. 650 milliGRAM(s) Oral every 6 hours PRN Temp greater or equal to 38C (100.4F), Mild Pain (1 - 3)  benzonatate 100 milliGRAM(s) Oral three times a day PRN Cough  oxyCODONE    IR 2.5 milliGRAM(s) Oral every 6 hours PRN Severe Pain (7 - 10)      Objective:    Vitals: Vital Signs Last 24 Hrs  T(C): 36.7 (04-21-25 @ 04:48), Max: 36.7 (04-21-25 @ 04:48)  T(F): 98.1 (04-21-25 @ 04:48), Max: 98.1 (04-21-25 @ 04:48)  HR: 77 (04-21-25 @ 04:48) (77 - 104)  BP: 101/63 (04-21-25 @ 04:48) (91/74 - 108/75)  BP(mean): --  RR: 18 (04-21-25 @ 04:48) (17 - 19)  SpO2: 95% (04-21-25 @ 04:48) (95% - 98%)                I&O's Summary      PHYSICAL EXAM:  GENERAL: NAD  HEAD:  Atraumatic, Normocephalic  EYES: EOMI, conjunctiva and sclera clear  CHEST/LUNG: Clear to auscultation bilaterally; No rales, rhonchi, wheezing, or rubs  HEART: Regular rate and rhythm; No murmurs, rubs, or gallops  ABDOMEN: Soft, Nontender, Nondistended;   SKIN: No rashes or lesions  NERVOUS SYSTEM:  Alert & Oriented X3, no focal deficits    LABS:                        14.0   21.05 )-----------( 175      ( 21 Apr 2025 05:50 )             42.5                         14.1   24.51 )-----------( 187      ( 20 Apr 2025 07:08 )             43.9                         15.1   32.01 )-----------( 215      ( 19 Apr 2025 07:45 )             45.3     04-20    137  |  102  |  43[H]  ----------------------------<  153[H]  4.0   |  21[L]  |  1.35[H]  04-19    133[L]  |  99  |  43[H]  ----------------------------<  151[H]  4.3   |  20[L]  |  1.90[H]  04-19    136  |  99  |  38[H]  ----------------------------<  114[H]  4.3   |  24  |  1.80[H]    Ca    9.2      20 Apr 2025 07:08  Ca    8.0[L]      19 Apr 2025 09:55  Ca    9.0      19 Apr 2025 07:45  Phos  2.9     04-20  Mg     2.20     04-20    TPro  6.6  /  Alb  3.2[L]  /  TBili  1.8[H]  /  DBili  x   /  AST  24  /  ALT  15  /  AlkPhos  87  04-20  TPro  x   /  Alb  x   /  TBili  3.2[H]  /  DBili  0.2  /  AST  x   /  ALT  x   /  AlkPhos  x   04-19  TPro  6.8  /  Alb  3.7  /  TBili  3.8[H]  /  DBili  x   /  AST  20  /  ALT  11  /  AlkPhos  70  04-19    CAPILLARY BLOOD GLUCOSE            Urinalysis Basic - ( 20 Apr 2025 07:08 )    Color: x / Appearance: x / SG: x / pH: x  Gluc: 153 mg/dL / Ketone: x  / Bili: x / Urobili: x   Blood: x / Protein: x / Nitrite: x   Leuk Esterase: x / RBC: x / WBC x   Sq Epi: x / Non Sq Epi: x / Bacteria: x          RADIOLOGY & ADDITIONAL TESTS:    Imaging Personally Reviewed:  [x ] YES  [ ] NO    Consultants involved in case:   Consultant(s) Notes Reviewed:  [ x] YES  [ ] NO:   Care Discussed with Consultants/Other Providers [x ] YES  [ ] NO         ***************************************************************  Cammy Moreira, PGY1  Internal Medicine   Available on [employer] TEAMS  ***************************************************************    IDALMIS MONTEZ  79y  MRN: [id number]    Patient is a 79y old  Male who presents with a chief complaint of Pneumonia (20 Apr 2025 21:49)      Interval/Overnight Events: no events ON.     Subjective: Pt seen and examined at bedside. Still having 9/10 R flank pain. Oxy controls pain but does not last long. No fevers, chills. Has some intermittent nausea with NBNB emesis.     MEDICATIONS  (STANDING):  aspirin enteric coated 81 milliGRAM(s) Oral daily  cefTRIAXone   IVPB 2000 milliGRAM(s) IV Intermittent every 24 hours  chlorhexidine 2% Cloths 1 Application(s) Topical <User Schedule>  guaiFENesin ER 1200 milliGRAM(s) Oral every 12 hours  levalbuterol Inhalation 0.63 milliGRAM(s) Inhalation every 6 hours  lidocaine   4% Patch 1 Patch Transdermal daily  predniSONE   Tablet 40 milliGRAM(s) Oral daily  rosuvastatin 10 milliGRAM(s) Oral at bedtime    MEDICATIONS  (PRN):  acetaminophen     Tablet .. 650 milliGRAM(s) Oral every 6 hours PRN Temp greater or equal to 38C (100.4F), Mild Pain (1 - 3)  benzonatate 100 milliGRAM(s) Oral three times a day PRN Cough  oxyCODONE    IR 2.5 milliGRAM(s) Oral every 6 hours PRN Severe Pain (7 - 10)      Objective:    Vitals: Vital Signs Last 24 Hrs  T(C): 36.7 (04-21-25 @ 04:48), Max: 36.7 (04-21-25 @ 04:48)  T(F): 98.1 (04-21-25 @ 04:48), Max: 98.1 (04-21-25 @ 04:48)  HR: 77 (04-21-25 @ 04:48) (77 - 104)  BP: 101/63 (04-21-25 @ 04:48) (91/74 - 108/75)  BP(mean): --  RR: 18 (04-21-25 @ 04:48) (17 - 19)  SpO2: 95% (04-21-25 @ 04:48) (95% - 98%)                I&O's Summary      PHYSICAL EXAM:  GENERAL: NAD  HEAD:  Atraumatic, Normocephalic  EYES: EOMI, conjunctiva and sclera clear  CHEST/LUNG: RLL with decreased breath sounds. No wheezing, crackles, or rhonchi.   HEART: Regular rate and rhythm; No murmurs, rubs, or gallops  ABDOMEN: Soft, Nontender, Nondistended;   SKIN: No rashes or lesions  NERVOUS SYSTEM:  Alert & Oriented X3, no focal deficits    LABS:                        14.0   21.05 )-----------( 175      ( 21 Apr 2025 05:50 )             42.5                         14.1   24.51 )-----------( 187      ( 20 Apr 2025 07:08 )             43.9                         15.1   32.01 )-----------( 215      ( 19 Apr 2025 07:45 )             45.3     04-20    137  |  102  |  43[H]  ----------------------------<  153[H]  4.0   |  21[L]  |  1.35[H]  04-19    133[L]  |  99  |  43[H]  ----------------------------<  151[H]  4.3   |  20[L]  |  1.90[H]  04-19    136  |  99  |  38[H]  ----------------------------<  114[H]  4.3   |  24  |  1.80[H]    Ca    9.2      20 Apr 2025 07:08  Ca    8.0[L]      19 Apr 2025 09:55  Ca    9.0      19 Apr 2025 07:45  Phos  2.9     04-20  Mg     2.20     04-20    TPro  6.6  /  Alb  3.2[L]  /  TBili  1.8[H]  /  DBili  x   /  AST  24  /  ALT  15  /  AlkPhos  87  04-20  TPro  x   /  Alb  x   /  TBili  3.2[H]  /  DBili  0.2  /  AST  x   /  ALT  x   /  AlkPhos  x   04-19  TPro  6.8  /  Alb  3.7  /  TBili  3.8[H]  /  DBili  x   /  AST  20  /  ALT  11  /  AlkPhos  70  04-19    CAPILLARY BLOOD GLUCOSE            Urinalysis Basic - ( 20 Apr 2025 07:08 )    Color: x / Appearance: x / SG: x / pH: x  Gluc: 153 mg/dL / Ketone: x  / Bili: x / Urobili: x   Blood: x / Protein: x / Nitrite: x   Leuk Esterase: x / RBC: x / WBC x   Sq Epi: x / Non Sq Epi: x / Bacteria: x          RADIOLOGY & ADDITIONAL TESTS:    Imaging Personally Reviewed:  [x ] YES  [ ] NO    Consultants involved in case:   Consultant(s) Notes Reviewed:  [ x] YES  [ ] NO:   Care Discussed with Consultants/Other Providers [x ] YES  [ ] NO

## 2025-04-21 NOTE — DIETITIAN INITIAL EVALUATION ADULT - ADD RECOMMEND
- Discontinue DASH/TLC given poor PO intake  - Monitor PO intake, tolerance to diet/supplement, nutrition related lab values, weight trends, BMs/GI distress, hydration status, skin integrity

## 2025-04-21 NOTE — PROGRESS NOTE ADULT - PROBLEM SELECTOR PLAN 2
Meeting sepsis criteria with leukocytosis, hypotension, lactate, borderline bandemia  s/p 2.5L IVF in ED  s/p CTX and azithromycin in ED  bacteremic with strep pneumo pending sensitivities    PLAN:  - Fluids PRN  - antibiotics as above, will narrow pending infectious workup  - f/u UA and UCX Meeting sepsis criteria with leukocytosis, hypotension, lactate, borderline bandemia  s/p 2.5L IVF in ED  s/p CTX and azithromycin in ED  bacteremic with strep pneumo pending sensitivities    PLAN:  - antibiotics as above, will narrow pending infectious workup  - Repeat Bcx sent 4/21

## 2025-04-21 NOTE — DIETITIAN INITIAL EVALUATION ADULT - PROBLEM SELECTOR PLAN 3
Patient with history of COPD, nonsmoker, but worked as a  for many years  Does not require home O2, has an albuterol inhaler that he never uses    - cw prednisone 40mg x 5  - xopenex Q6H  - cw cefepime/azithromycin

## 2025-04-21 NOTE — DIETITIAN INITIAL EVALUATION ADULT - PERTINENT MEDS FT
MEDICATIONS  (STANDING):  aspirin enteric coated 81 milliGRAM(s) Oral daily  cefTRIAXone   IVPB 2000 milliGRAM(s) IV Intermittent every 24 hours  chlorhexidine 2% Cloths 1 Application(s) Topical <User Schedule>  guaiFENesin ER 1200 milliGRAM(s) Oral every 12 hours  levalbuterol Inhalation 0.63 milliGRAM(s) Inhalation every 6 hours  lidocaine   4% Patch 1 Patch Transdermal daily  predniSONE   Tablet 40 milliGRAM(s) Oral daily  rosuvastatin 10 milliGRAM(s) Oral at bedtime    MEDICATIONS  (PRN):  acetaminophen     Tablet .. 650 milliGRAM(s) Oral every 6 hours PRN Temp greater or equal to 38C (100.4F), Mild Pain (1 - 3)  benzonatate 100 milliGRAM(s) Oral three times a day PRN Cough  oxyCODONE    IR 2.5 milliGRAM(s) Oral every 6 hours PRN Severe Pain (7 - 10)

## 2025-04-21 NOTE — PROGRESS NOTE ADULT - ASSESSMENT
Patient is a 80 y/o male h/o HTN, HLD, COPD, paroxysmal A-fib with loop recorder not on AC c/o chest pain, fatigue, shortness of breath for the last day. Patient meeting sepsis criteria with leukocytosis, hypotension, borderline bandemia, with a lactate, findings on CTA PE notable for RLL pneumonia. Admitted to medicine for further management.  Patient is a 78 y/o male h/o HTN, HLD, COPD, paroxysmal A-fib with loop recorder not on AC c/o chest pain, fatigue, shortness of breath for the last day. Patient meeting sepsis criteria with leukocytosis, hypotension, borderline bandemia, with a lactate, findings on CTA PE notable for RLL pneumonia. ID consulted, likely will need 2 weeks of total abx. Pending culture sensitivities.
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
## 2025-04-21 NOTE — DIETITIAN INITIAL EVALUATION ADULT - PERTINENT LABORATORY DATA
04-21    137  |  100  |  34[H]  ----------------------------<  102[H]  4.6   |  23  |  1.03    Ca    9.4      21 Apr 2025 05:50  Phos  2.8     04-21  Mg     2.10     04-21    TPro  6.0  /  Alb  3.0[L]  /  TBili  0.9  /  DBili  x   /  AST  25  /  ALT  17  /  AlkPhos  89  04-21

## 2025-04-21 NOTE — CONSULT NOTE ADULT - ASSESSMENT
Patient is a 80 y/o male h/o HTN, HLD, COPD, paroxysmal A-fib with loop recorder not on AC presented on 4/19 with Chills, chest pain, fatigue, shortness of breath X2 days. ROS otherwise negative at presentation. In the ED, patient was hypotensive to 84/52, tmax 99.8. Desaturated to 89% on room air transiently otherwise maintained on RA. Labs notable for leukocytosis to 32.01, 9.3% bands, d-dimer 614, creatinine 1.8 (baseline ~1), bilirubin 3.8, pro-BNP 1450, lactate 3.9 > 3.4. CTA PE with no PE, RLL consolidation with obstructed right lower lobe airways, likely pneumonia. Given azithromycin 500mg x1, CTX 1g x 1 and admitted to medicine. Remains afebrile, blood cultures now with streptococcus pneumoniae, ID asked to evaluate.     Microbiology  RVP Full; negative  Blood Cx 4/19 4/4 Strep pneumoniae      # High grade pneumococcal bacteremia  # RLL consolidation/obstructed airways  # COPD  # Pancreatic cyst    - Afebrile, hemodynamically stable, unclear if bacteremia associated with pneumonia or primary  - On ceftriaxone 2 gm Q24h; continue  - No need for empirical vancomycin coverage, follow up susceptibilities  - Repeat blood cultures Q48hrs until clear  - Evaluation of pancreatic cyst per primary   - Continue to trend CBC, fever curve  - Will likely need interval imaging, consider TTE if bacteremia persists  - Will benefit from Pneumococcal immunization if not received prior      Incomplete note  Patient is a 80 y/o male h/o HTN, HLD, COPD, paroxysmal A-fib with loop recorder not on AC presented on 4/19 with Chills, chest pain, fatigue, shortness of breath X2 days. He was is his usual state of health and went bowling on Thursday, symptoms started in the evening. ROS otherwise negative at presentation. In the ED, patient was hypotensive to 84/52, tmax 99.8. Desaturated to 89% on room air transiently otherwise maintained on RA. Labs notable for leukocytosis to 32.01, 9.3% bands, d-dimer 614, creatinine 1.8 (baseline ~1), bilirubin 3.8, pro-BNP 1450, lactate 3.9 > 3.4. CTA PE with no PE, RLL consolidation with obstructed right lower lobe airways, likely pneumonia. Given azithromycin 500mg x1, CTX 1g x 1 and admitted to medicine. Remains afebrile, blood cultures now with streptococcus pneumoniae, ID asked to evaluate.     Microbiology  RVP Full; negative  Blood Cx 4/19 4/4 Strep pneumoniae      # High grade pneumococcal bacteremia  # RLL consolidation/obstructed airways  # COPD  # Pancreatic cyst    - Afebrile, hemodynamically stable, unclear if bacteremia associated with pneumonia or primary, no extrapulmonary symptoms  - Continue ceftriaxone 2 gm Q24h; follow up susceptibilities  - Repeat blood cultures Q48hrs until clear  - Evaluation of pancreatic cyst per primary   - Continue to trend CBC, fever curve  - Will need follow up imaging re obstructed airways, patient denies aspiration episodes  - will need consider TTE if bacteremia persists  - Reports receiving pneumococcal vaccine within last 2 years, he should follow up with PCP to ensure adequate immunization        All recommendations are tentative pending Attending Attestation.    Rohan Garcias MD, PGY-5  ID Fellow  [employer] Teams Preferred  After 5pm/weekends call [phone]   Patient is a 78 y/o male h/o HTN, HLD, COPD, paroxysmal A-fib with loop recorder not on AC presented on 4/19 with Chills, chest pain, fatigue, shortness of breath X2 days. He was is his usual state of health and went bowling on Thursday, symptoms started in the evening. ROS otherwise negative at presentation. In the ED, patient was hypotensive to 84/52, tmax 99.8. Desaturated to 89% on room air transiently otherwise maintained on RA. Labs notable for leukocytosis to 32.01, 9.3% bands, d-dimer 614, creatinine 1.8 (baseline ~1), bilirubin 3.8, pro-BNP 1450, lactate 3.9 > 3.4. CTA PE with no PE, RLL consolidation with obstructed right lower lobe airways, likely pneumonia. Given azithromycin 500mg x1, CTX 1g x 1 and admitted to medicine. Remains afebrile, blood cultures now with streptococcus pneumoniae, ID asked to evaluate.     Microbiology  RVP Full; negative  Blood Cx 4/19 4/4 Strep pneumoniae      # High grade pneumococcal bacteremia  # RLL consolidation/obstructed airways  # Leucocytosis, bandemia, lactic acidosis  # COPD  # KEVIN  # Pancreatic cyst    - Afebrile, hemodynamically stable, unclear if bacteremia associated with pneumonia or primary, no extrapulmonary symptoms  - Continue ceftriaxone 2 gm Q24h; follow up susceptibilities  - Repeat blood cultures Q48hrs until clear  - Evaluation of pancreatic cyst per primary   - Continue to trend CBC, fever curve  - Will need follow up imaging re obstructed airways, patient denies aspiration episodes  - will need consider TTE if bacteremia persists  - Reports receiving pneumococcal vaccine within last 2 years, he should follow up with PCP to ensure adequate immunization        All recommendations are tentative pending Attending Attestation.    Rohan Garcias MD, PGY-5  ID Fellow  [employer] Teams Preferred  After 5pm/weekends call [phone]   Patient is a 80 y/o male h/o HTN, HLD, COPD, paroxysmal A-fib with loop recorder not on AC presented on 4/19 with Chills, chest pain, fatigue, shortness of breath X2 days. He was is his usual state of health and went bowling on Thursday, symptoms started in the evening. ROS otherwise negative at presentation. In the ED, patient was hypotensive to 84/52, tmax 99.8. Desaturated to 89% on room air transiently otherwise maintained on RA. Labs notable for leukocytosis to 32.01, 9.3% bands, d-dimer 614, creatinine 1.8 (baseline ~1), bilirubin 3.8, pro-BNP 1450, lactate 3.9 > 3.4. CTA PE with no PE, RLL consolidation with obstructed right lower lobe airways, likely pneumonia. Given azithromycin 500mg x1, CTX 1g x 1 and admitted to medicine. Remains afebrile, blood cultures now with streptococcus pneumoniae, ID asked to evaluate.     Microbiology  RVP Full; negative  Blood Cx 4/19 4/4 Strep pneumoniae      # High grade pneumococcal bacteremia  # RLL consolidation/obstructed airways  # Leucocytosis, bandemia, lactic acidosis  # COPD  # KEVIN  # Pancreatic cyst    - Afebrile, hemodynamically stable, unclear if bacteremia associated with pneumonia or primary, no extrapulmonary symptoms  - Continue ceftriaxone 2 gm Q24h; follow up susceptibilities  - Repeat blood cultures Q48hrs until clear  - Evaluation of pancreatic cyst per primary   - Continue to trend CBC, fever curve  - Will need follow up imaging re obstructed airways, patient denies aspiration episodes  - Check TTE   - Reports receiving pneumococcal vaccine within last 2 years, he should follow up with PCP to ensure adequate immunization      Case d/w Attending and Primary team.     Rohan Garcias MD, PGY-5  ID Fellow  [employer] Teams Preferred  After 5pm/weekends call [phone]

## 2025-04-21 NOTE — PROGRESS NOTE ADULT - PROBLEM SELECTOR PLAN 6
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    - Follow up outpatient CTAP with Uncinate process pancreatic cyst measuring 3.1 cm, increased in size since 2017.    PLAN  - Follow up outpatient

## 2025-04-21 NOTE — DIETITIAN INITIAL EVALUATION ADULT - PROBLEM SELECTOR PLAN 4
Likely pre-renal in the setting of poor PO intake and hypotension  s/p 2.5L IVF  Baseline creatinine ~1.0    - IVF prn  - Encourage PO intake  - F/u urine studies  - Avoid nephrotoxic medications  - Monitor I/O  - CTM

## 2025-04-21 NOTE — PROGRESS NOTE ADULT - PROBLEM SELECTOR PLAN 4
Likely pre-renal in the setting of poor PO intake and hypotension  s/p 2.5L IVF  Baseline creatinine ~1.0  Urine studies indicate prerenal KEIVN    - 500cc LR today  - Encourage PO intake  - Avoid nephrotoxic medications  - Monitor I/O  - CTM Likely pre-renal in the setting of poor PO intake and hypotension  s/p 2.5L IVF  Baseline creatinine ~1.0  Urine studies indicate prerenal KEVIN    Plan:   - Encourage PO intake  - Avoid nephrotoxic medications  - Monitor I/O  - RESOLVED

## 2025-04-21 NOTE — PROGRESS NOTE ADULT - PROBLEM SELECTOR PLAN 9
Patient with one episode of paroxysmal afib in the setting of taking albuterol inhaler frequently over the course of one day.  Per patient, no further episodes of afib recorded with ILR  Not on AC    PLAN:  - holding home metoprolol in the setting of hypotension Patient with one episode of paroxysmal afib in the setting of taking albuterol inhaler frequently over the course of one day.  Per patient, no further episodes of afib recorded with ILR  Not on AC    PLAN:  - Restart home metoprolol for hx of afib   - Sepsis resolved

## 2025-04-21 NOTE — PROGRESS NOTE ADULT - PROBLEM SELECTOR PLAN 3
Patient with history of COPD, nonsmoker, but worked as a  for many years  Does not require home O2, has an albuterol inhaler that he never uses    - cw prednisone 40mg x 5  - xopenex Q6H  - cw Ceftriaxone  - f/u TTE Patient with history of COPD, nonsmoker, but worked as a  for many years  Does not require home O2, has an albuterol inhaler that he never uses  No wheezing on exam     PLAN:   - DC prednisone  - xopenex Q6H PRN for wheezing   - cw Ceftriaxone for PNA   - TTE --> grossly unremarkable

## 2025-04-21 NOTE — DIETITIAN INITIAL EVALUATION ADULT - ORAL INTAKE PTA/DIET HISTORY
Patient seen for assessment. Limited information obtained at this time due to patient w/ N/V. No issues w/ appetite reported at home prior to admission. No special diets followed.

## 2025-04-21 NOTE — CONSULT NOTE ADULT - ATTENDING COMMENTS
This is a 78 y/o M w/ PMHx of HTN, HLD, COPD, pAF w/ loop recorder admitted to [facility] on 4/19/25 for chills, CP, SOB, found to be hypotensive, hypoxic, WBC 32.   CTA Chest w/o PE, had RLL consolidation, c/f PNA, started on Ceftriaxone/Azithromycin.   BCx w/ Strep pneumoniae.    #Strep pneumoniae bacteremia 2/2 RLL PNA   #Septic shock  #Hypoxic respiratory failure   #KEVIN, improving    Overall, 78 y/o M w/ PMhx of HTN, HLD, COPD, pAF w/ loop recorder admitted to [facility] on 4/19/25 for Strep pneumoniae bacteremia 2/2 RLL PNA     Recommendations:   1. Ceftriaxone 2 g q24, likely 14 day course, can potentially finish with oral abx   2. F/u BCx   3. TTE w/o IE   4. Will need repeat CT Chest in 2-3 months    Thank you for consulting us and involving us in the management of this patient's case. In addition to reviewing history, imaging, documents, labs, microbiology, and infection control strategies and potential issues.     ID will continue to follow    Bernard Ayala M.D.  Attending Physician  Division of Infectious Diseases  Department of Medicine    Please contact through MS Teams message.  Office: [phone] (after 5 PM or weekend) Patient/Caregiver provided printed discharge information.

## 2025-04-21 NOTE — DIETITIAN INITIAL EVALUATION ADULT - PROBLEM SELECTOR PLAN 1
CTA PE notable for RLL pneumonia and left linear atelectasis  s/p CTX and azithromycin in the ED  RVP negative  CXR clear    PLAN:  - f/u bcx, urine strep, legionella, MRSA swab  - expand to full RVP  - cw Cefepime/Azithromycin  - cw prednisone 40mg x 5  - for pleuritic chest pain can give tylenol for mild pain, oxycodone 2.5mg for moderate pain, oxycodone 5mg for severe pain  - cw fluids prn

## 2025-04-21 NOTE — CONSULT NOTE ADULT - SUBJECTIVE AND OBJECTIVE BOX
Patient is a 79y old  Male who presents with a chief complaint of Pneumonia (21 Apr 2025 07:54)    HPI:  Patient is a 78 y/o male h/o HTN, HLD, COPD, paroxysmal A-fib with loop recorder not on AC presented on 4/19 with Chills, chest pain, fatigue, shortness of breath X2 days. ROS otherwise negative at presentation. In the ED, patient was hypotensive to 84/52, tmax 99.8. Desaturated to 89% on room air transiently otherwise maintained on RA. Labs notable for leukocytosis to 32.01, 9.3% bands, d-dimer 614, creatinine 1.8 (baseline ~1), bilirubin 3.8, pro-BNP 1450, lactate 3.9 > 3.4. CTA PE with no PE, RLL consolidation with obstructed right lower lobe airways, likely pneumonia. Given azithromycin 500mg x1, CTX 1g x 1 and admitted to medicine. Remains afebrile, blood cultures now with streptococcus pneumoniae, ID asked to evaluate.     Microbiology  RVP Full; negative  Blood Cx 4/19 4/4 Strep pneumoniae      prior hospital charts reviewed [  ]  primary team notes reviewed [x  ]  other consultant notes reviewed [  ]    PAST MEDICAL & SURGICAL HISTORY:  HTN (hypertension)      History of COPD      Atrial fibrillation      HLD (hyperlipidemia)          Allergies  No Known Allergies    ANTIMICROBIALS (past 90 days)  MEDICATIONS  (STANDING):    azithromycin  IVPB   250 mL/Hr IV Intermittent (04-19-25 @ 11:12)    cefepime   IVPB   100 mL/Hr IV Intermittent (04-20-25 @ 03:10)   100 mL/Hr IV Intermittent (04-19-25 @ 14:12)    cefTRIAXone   IVPB   100 mL/Hr IV Intermittent (04-19-25 @ 09:07)    cefTRIAXone   IVPB   100 mL/Hr IV Intermittent (04-20-25 @ 13:40)    vancomycin  IVPB   250 mL/Hr IV Intermittent (04-19-25 @ 14:52)        cefTRIAXone   IVPB 2000 every 24 hours    MEDICATIONS  (STANDING):  acetaminophen     Tablet .. 650 every 6 hours PRN  aspirin enteric coated 81 daily  benzonatate 100 three times a day PRN  guaiFENesin ER 1200 every 12 hours  levalbuterol Inhalation 0.63 every 6 hours  oxyCODONE    IR 2.5 every 6 hours PRN  predniSONE   Tablet 40 daily  rosuvastatin 10 at bedtime    SOCIAL HISTORY:       FAMILY HISTORY:    REVIEW OF SYSTEMS  [  ] ROS unobtainable because:    [  ] All other systems negative except as noted below:	    Constitutional:  [ ] fever [ ] chills  [ ] weight loss  [ ] weakness  Skin:  [ ] rash [ ] phlebitis	  Eyes: [ ] icterus [ ] pain  [ ] discharge	  ENMT: [ ] sore throat  [ ] thrush [ ] ulcers [ ] exudates  Respiratory: [ ] dyspnea [ ] hemoptysis [ ] cough [ ] sputum	  Cardiovascular:  [ ] chest pain [ ] palpitations [ ] edema	  Gastrointestinal:  [ ] nausea [ ] vomiting [ ] diarrhea [ ] constipation [ ] pain	  Genitourinary:  [ ] dysuria [ ] frequency [ ] hematuria [ ] discharge [ ] flank pain  [ ] incontinence  Musculoskeletal:  [ ] myalgias [ ] arthralgias [ ] arthritis  [ ] back pain  Neurological:  [ ] headache [ ] seizures  [ ] confusion/altered mental status  Psychiatric:  [ ] anxiety [ ] depression	  Hematology/Lymphatics:  [ ] lymphadenopathy  Endocrine:  [ ] adrenal [ ] thyroid  Allergic/Immunologic:	 [ ] transplant [ ] seasonal    Vital Signs Last 24 Hrs  T(F): 98.1 (04-21-25 @ 04:48), Max: 99.8 (04-19-25 @ 08:24)  Vital Signs Last 24 Hrs  HR: 77 (04-21-25 @ 04:48) (77 - 104)  BP: 101/63 (04-21-25 @ 04:48) (91/74 - 108/75)  RR: 18 (04-21-25 @ 04:48)  SpO2: 95% (04-21-25 @ 04:48) (95% - 98%)  Wt(kg): --    PHYSICAL EXAM:                              14.0   21.05 )-----------( 175      ( 21 Apr 2025 05:50 )             42.5   04-21    137  |  100  |  34[H]  ----------------------------<  102[H]  4.6   |  23  |  1.03    Ca    9.4      21 Apr 2025 05:50  Phos  2.8     04-21  Mg     2.10     04-21    TPro  6.0  /  Alb  3.0[L]  /  TBili  0.9  /  DBili  x   /  AST  25  /  ALT  17  /  AlkPhos  89  04-21        MICROBIOLOGY:  Urinalysis with Rflx Culture (collected 19 Apr 2025 15:48)    Culture - Blood (collected 19 Apr 2025 08:00)  Source: Blood Blood-Venous  Gram Stain (20 Apr 2025 05:40):    Growth in aerobic bottle: Gram Positive Cocci in Pairs and Chains    Growth in anaerobic bottle: Gram Positive Cocci in Pairs and Chains  Preliminary Report (20 Apr 2025 18:31):    Growth in aerobic and anaerobic bottles: Streptococcus pneumoniae    Direct identification is available within approximately 3-5    hours either by Blood Panel Multiplexed PCR or Direct    MALDI-TOF. Details: [email or web address]  Organism: Blood Culture PCR (20 Apr 2025 04:33)  Organism: Blood Culture PCR (20 Apr 2025 04:33)      -  Streptococcus pneumoniae: Detec      Method Type: PCR    Culture - Blood (collected 19 Apr 2025 07:30)  Source: Blood Blood-Peripheral  Gram Stain (20 Apr 2025 02:09):    Growth in aerobic bottle: Gram Positive Cocci in Pairs and Chains    Growth in anaerobic bottle: Gram Positive Cocci in Pairs and Chains  Preliminary Report (20 Apr 2025 18:21):    Growth in aerobic and anaerobic bottles: Streptococcus pneumoniae    See previous culture [id number]              Rapid RVP Result: NotDetec (04-19 @ 07:20)      RADIOLOGY:  imaging below personally reviewed and agree with findings    < from: CT Abdomen and Pelvis w/ IV Cont (04.19.25 @ 09:55) >    ACC: [id number] EXAM:  CT ABDOMEN AND PELVIS IC   ORDERED BY: MILLI MONACO     ACC: [id number] EXAM:  CT ANGIO CHEST PULM [facility]   ORDERED BY: MILLI MONACO     PROCEDURE DATE:  04/19/2025          INTERPRETATION:  CLINICAL INFORMATION: Shortness of breath with chest   pain. Right flank pain.    COMPARISON: CT abdomen and pelvis 12/8/2017    CONTRAST/COMPLICATIONS:  IV Contrast: Omnipaque 350  90 cc administered   10 cc discarded  Oral Contrast: NONE  .    PROCEDURE:  CT Angiography of the Chest was performed followed by portal venous phase   imaging of the Abdomen and Pelvis.  Sagittal and coronal reformats were performed as well as 3D (MIP)   reconstructions.    FINDINGS:  CHEST:  LUNGS AND LARGE AIRWAYS: Trace mucus in the right bronchus. Right lower   lobe airways are obstructed with debris. Consolidation in the right lower   lobe suspicious for pneumonia. Left basilar linear atelectasis.  PLEURA: No pleural effusion.  VESSELS: Aortic calcifications. Coronary artery calcifications.   Borderline ectatic ascending aorta measuring 4.0 cm. There is appropriate   contrast opacification of the pulmonary arteries. No filling defect of   the main, lobar, segmental, and subsegmental pulmonary arteries to   suggest pulmonary embolism.  HEART: Heartsize is normal. No pericardial effusion.  MEDIASTINUM AND ADELITA: No lymphadenopathy.  CHEST WALL AND LOWER NECK: Cardiac loop recorder. Diminutive thyroid.    ABDOMEN AND PELVIS:  LIVER: Hepatic cyst. Stable hemangioma measuring 2.4 cm.  BILE DUCTS: Normal caliber.  GALLBLADDER: Within normal limits.  SPLEEN: Within normal limits.  PANCREAS: Fatty atrophy of the pancreas. Uncinate process pancreatic   cysts measuring 3.1 x 1.8 cm (303-44), increased in size since 2017 which   measured 1.1 x 0.8 cm.  ADRENALS: Stable 1.0 cm right adrenal nodule.  KIDNEYS/URETERS: Right renal cyst. No hydronephrosis. No stones. Normal   cortical enhancement.    BLADDER: Within normal limits.  REPRODUCTIVE ORGANS: Prostate is enlarged.    BOWEL: Colonic diverticulosis. Appendix is not visualized. No evidence of   inflammation in the pericecal region.  PERITONEUM/RETROPERITONEUM: Within normal limits.  VESSELS: Atherosclerotic changes. Retroaortic left renal vein.  LYMPH NODES: No lymphadenopathy.  ABDOMINAL WALL: Postsurgical changes. Lower pelvic bilateral inguinal   hernia repair.  BONES: Degenerative changes. 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    IMPRESSION:  No pulmonary embolism.    Right lower lobe consolidation with obstructed right lower lobe airways,   likely pneumonia, possibly secondary to aspiration.    Uncinate process pancreatic cyst measuring 3.1 cm, increased in size   since 2017.        --- End of Report ---     Patient is a 79y old  Male who presents with a chief complaint of Pneumonia (21 Apr 2025 07:54)    HPI:  Patient is a 80 y/o male h/o HTN, HLD, COPD, paroxysmal A-fib with loop recorder not on AC presented on 4/19 with Chills, chest pain, fatigue, shortness of breath X2 days. He was is his usual state of health and went bowling on Thursday, symptoms started in the evening. ROS otherwise negative at presentation. In the ED, patient was hypotensive to 84/52, tmax 99.8. Desaturated to 89% on room air transiently otherwise maintained on RA. Labs notable for leukocytosis to 32.01, 9.3% bands, d-dimer 614, creatinine 1.8 (baseline ~1), bilirubin 3.8, pro-BNP 1450, lactate 3.9 > 3.4. CTA PE with no PE, RLL consolidation with obstructed right lower lobe airways, likely pneumonia. Given azithromycin 500mg x1, CTX 1g x 1 and admitted to medicine. Remains afebrile, blood cultures now with streptococcus pneumoniae, ID asked to evaluate.     Microbiology  RVP Full; negative  Blood Cx 4/19 4/4 Strep pneumoniae      prior hospital charts reviewed [  ]  primary team notes reviewed [x  ]  other consultant notes reviewed [  ]    PAST MEDICAL & SURGICAL HISTORY:  HTN (hypertension)      History of COPD      Atrial fibrillation      HLD (hyperlipidemia)          Allergies  No Known Allergies    ANTIMICROBIALS (past 90 days)  MEDICATIONS  (STANDING):    azithromycin  IVPB   250 mL/Hr IV Intermittent (04-19-25 @ 11:12)    cefepime   IVPB   100 mL/Hr IV Intermittent (04-20-25 @ 03:10)   100 mL/Hr IV Intermittent (04-19-25 @ 14:12)    cefTRIAXone   IVPB   100 mL/Hr IV Intermittent (04-19-25 @ 09:07)    cefTRIAXone   IVPB   100 mL/Hr IV Intermittent (04-20-25 @ 13:40)    vancomycin  IVPB   250 mL/Hr IV Intermittent (04-19-25 @ 14:52)        cefTRIAXone   IVPB 2000 every 24 hours    MEDICATIONS  (STANDING):  acetaminophen     Tablet .. 650 every 6 hours PRN  aspirin enteric coated 81 daily  benzonatate 100 three times a day PRN  guaiFENesin ER 1200 every 12 hours  levalbuterol Inhalation 0.63 every 6 hours  oxyCODONE    IR 2.5 every 6 hours PRN  predniSONE   Tablet 40 daily  rosuvastatin 10 at bedtime    SOCIAL HISTORY:  No tobacco, etoh use    FAMILY HISTORY: Non contributory    REVIEW OF SYSTEMS  [  ] ROS unobtainable because:    [x  ] All other systems negative except as noted below:	    Constitutional:  [ ] fever [ ] chills  [ ] weight loss  [ ] weakness  Skin:  [ ] rash [ ] phlebitis	  Eyes: [ ] icterus [ ] pain  [ ] discharge	  ENMT: [ ] sore throat  [ ] thrush [ ] ulcers [ ] exudates  Respiratory: [ ] dyspnea [ ] hemoptysis [ ] cough [ ] sputum Pain+	  Cardiovascular:  [ ] chest pain [ ] palpitations [ ] edema	  Gastrointestinal:  [ ] nausea [ ] vomiting [ ] diarrhea [ ] constipation [ ] pain	  Genitourinary:  [ ] dysuria [ ] frequency [ ] hematuria [ ] discharge [ ] flank pain  [ ] incontinence  Musculoskeletal:  [ ] myalgias [ ] arthralgias [ ] arthritis  [ ] back pain  Neurological:  [ ] headache [ ] seizures  [ ] confusion/altered mental status  Psychiatric:  [ ] anxiety [ ] depression	  Hematology/Lymphatics:  [ ] lymphadenopathy  Endocrine:  [ ] adrenal [ ] thyroid  Allergic/Immunologic:	 [ ] transplant [ ] seasonal    Vital Signs Last 24 Hrs  T(F): 98.1 (04-21-25 @ 04:48), Max: 99.8 (04-19-25 @ 08:24)  Vital Signs Last 24 Hrs  HR: 77 (04-21-25 @ 04:48) (77 - 104)  BP: 101/63 (04-21-25 @ 04:48) (91/74 - 108/75)  RR: 18 (04-21-25 @ 04:48)  SpO2: 95% (04-21-25 @ 04:48) (95% - 98%)  Wt(kg): --    PHYSICAL EXAM:    General: Patient in NAD  HEENT: NCAT, EOMI, PERRL, no oral lesions  CV: S1+S2, no m/r/g appreciated   Lungs: No respiratory distress, CTAB  Abd: Soft, nontender, no guarding, no rebound tenderness, + bowel sounds   : No suprapubic tenderness  Neuro: Alert and oriented to time, place and person. Moves all extremities against gravity.  Ext: No cyanosis, no edema  Skin: No rash, no phlebitis                              14.0   21.05 )-----------( 175      ( 21 Apr 2025 05:50 )             42.5   04-21    137  |  100  |  34[H]  ----------------------------<  102[H]  4.6   |  23  |  1.03    Ca    9.4      21 Apr 2025 05:50  Phos  2.8     04-21  Mg     2.10     04-21    TPro  6.0  /  Alb  3.0[L]  /  TBili  0.9  /  DBili  x   /  AST  25  /  ALT  17  /  AlkPhos  89  04-21        MICROBIOLOGY:  Urinalysis with Rflx Culture (collected 19 Apr 2025 15:48)    Culture - Blood (collected 19 Apr 2025 08:00)  Source: Blood Blood-Venous  Gram Stain (20 Apr 2025 05:40):    Growth in aerobic bottle: Gram Positive Cocci in Pairs and Chains    Growth in anaerobic bottle: Gram Positive Cocci in Pairs and Chains  Preliminary Report (20 Apr 2025 18:31):    Growth in aerobic and anaerobic bottles: Streptococcus pneumoniae    Direct identification is available within approximately 3-5    hours either by Blood Panel Multiplexed PCR or Direct    MALDI-TOF. Details: [email or web address]  Organism: Blood Culture PCR (20 Apr 2025 04:33)  Organism: Blood Culture PCR (20 Apr 2025 04:33)      -  Streptococcus pneumoniae: Detec      Method Type: PCR    Culture - Blood (collected 19 Apr 2025 07:30)  Source: Blood Blood-Peripheral  Gram Stain (20 Apr 2025 02:09):    Growth in aerobic bottle: Gram Positive Cocci in Pairs and Chains    Growth in anaerobic bottle: Gram Positive Cocci in Pairs and Chains  Preliminary Report (20 Apr 2025 18:21):    Growth in aerobic and anaerobic bottles: Streptococcus pneumoniae    See previous culture [id number]              Rapid RVP Result: NotDetec (04-19 @ 07:20)      RADIOLOGY:  imaging below personally reviewed and agree with findings    < from: CT Abdomen and Pelvis w/ IV Cont (04.19.25 @ 09:55) >    ACC: [id number] EXAM:  CT ABDOMEN AND PELVIS IC   ORDERED BY: MILLI MONACO     ACC: [id number] EXAM:  CT ANGIO CHEST PULM [facility]   ORDERED BY: MILLI MONACO     PROCEDURE DATE:  04/19/2025          INTERPRETATION:  CLINICAL INFORMATION: Shortness of breath with chest   pain. Right flank pain.    COMPARISON: CT abdomen and pelvis 12/8/2017    CONTRAST/COMPLICATIONS:  IV Contrast: Omnipaque 350  90 cc administered   10 cc discarded  Oral Contrast: NONE  .    PROCEDURE:  CT Angiography of the Chest was performed followed by portal venous phase   imaging of the Abdomen and Pelvis.  Sagittal and coronal reformats were performed as well as 3D (MIP)   reconstructions.    FINDINGS:  CHEST:  LUNGS AND LARGE AIRWAYS: Trace mucus in the right bronchus. Right lower   lobe airways are obstructed with debris. Consolidation in the right lower   lobe suspicious for pneumonia. Left basilar linear atelectasis.  PLEURA: No pleural effusion.  VESSELS: Aortic calcifications. Coronary artery calcifications.   Borderline ectatic ascending aorta measuring 4.0 cm. There is appropriate   contrast opacification of the pulmonary arteries. No filling defect of   the main, lobar, segmental, and subsegmental pulmonary arteries to   suggest pulmonary embolism.  HEART: Heartsize is normal. No pericardial effusion.  MEDIASTINUM AND ADELITA: No lymphadenopathy.  CHEST WALL AND LOWER NECK: Cardiac loop recorder. Diminutive thyroid.    ABDOMEN AND PELVIS:  LIVER: Hepatic cyst. Stable hemangioma measuring 2.4 cm.  BILE DUCTS: Normal caliber.  GALLBLADDER: Within normal limits.  SPLEEN: Within normal limits.  PANCREAS: Fatty atrophy of the pancreas. Uncinate process pancreatic   cysts measuring 3.1 x 1.8 cm (303-44), increased in size since 2017 which   measured 1.1 x 0.8 cm.  ADRENALS: Stable 1.0 cm right adrenal nodule.  KIDNEYS/URETERS: Right renal cyst. No hydronephrosis. No stones. Normal   cortical enhancement.    BLADDER: Within normal limits.  REPRODUCTIVE ORGANS: Prostate is enlarged.    BOWEL: Colonic diverticulosis. Appendix is not visualized. No evidence of   inflammation in the pericecal region.  PERITONEUM/RETROPERITONEUM: Within normal limits.  VESSELS: Atherosclerotic changes. Retroaortic left renal vein.  LYMPH NODES: No lymphadenopathy.  ABDOMINAL WALL: Postsurgical changes. Lower pelvic bilateral inguinal   hernia repair.  BONES: Degenerative changes. 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    IMPRESSION:  No pulmonary embolism.    Right lower lobe consolidation with obstructed right lower lobe airways,   likely pneumonia, possibly secondary to aspiration.    Uncinate process pancreatic cyst measuring 3.1 cm, increased in size   since 2017.        --- End of Report ---     Patient is a 79y old  Male who presents with a chief complaint of Pneumonia (21 Apr 2025 07:54)    HPI:  Patient is a 78 y/o male h/o HTN, HLD, COPD, paroxysmal A-fib with loop recorder not on AC presented on 4/19 with Chills, chest pain, fatigue, shortness of breath X2 days. He was in his usual state of health and went bowling on Thursday, symptoms started in the evening. ROS otherwise negative at presentation. In the ED, patient was hypotensive to 84/52, tmax 99.8. Desaturated to 89% on room air transiently otherwise maintained on RA. Labs notable for leukocytosis to 32.01, 9.3% bands, d-dimer 614, creatinine 1.8 (baseline ~1), bilirubin 3.8, pro-BNP 1450, lactate 3.9 > 3.4. CTA PE with no PE, RLL consolidation with obstructed right lower lobe airways, likely pneumonia. Given azithromycin 500mg x1, CTX 1g x 1 and admitted to medicine. Remains afebrile, blood cultures now with streptococcus pneumoniae, ID asked to evaluate.    Of note patient worked as a  and was told that his reactive airway disease and COPD developed in the setting of occupational smoke exposure; he states that protective masks were never routinely used in the first 12 years of his job. Most recent hospital visit was in 2023 for placement of an implantable loop recorder for paroxysmal afib; he states that he has not had any episodes of afib in the last 2 years. Prior to that he had not been hospitalized in over 40 years. Currently he is completely independent and walks 3+ miles several times a week. He saw his PCP on 4/11 and was in good health at the time.     Microbiology  RVP Full; negative  Blood Cx 4/19 4/4 Strep pneumoniae      prior hospital charts reviewed [  ]  primary team notes reviewed [x  ]  other consultant notes reviewed [  ]    PAST MEDICAL & SURGICAL HISTORY:  HTN (hypertension)      History of COPD      Atrial fibrillation      HLD (hyperlipidemia)          Allergies  No Known Allergies    ANTIMICROBIALS (past 90 days)  MEDICATIONS  (STANDING):    azithromycin  IVPB   250 mL/Hr IV Intermittent (04-19-25 @ 11:12)    cefepime   IVPB   100 mL/Hr IV Intermittent (04-20-25 @ 03:10)   100 mL/Hr IV Intermittent (04-19-25 @ 14:12)    cefTRIAXone   IVPB   100 mL/Hr IV Intermittent (04-19-25 @ 09:07)    cefTRIAXone   IVPB   100 mL/Hr IV Intermittent (04-20-25 @ 13:40)    vancomycin  IVPB   250 mL/Hr IV Intermittent (04-19-25 @ 14:52)        cefTRIAXone   IVPB 2000 every 24 hours    MEDICATIONS  (STANDING):  acetaminophen     Tablet .. 650 every 6 hours PRN  aspirin enteric coated 81 daily  benzonatate 100 three times a day PRN  guaiFENesin ER 1200 every 12 hours  levalbuterol Inhalation 0.63 every 6 hours  oxyCODONE    IR 2.5 every 6 hours PRN  predniSONE   Tablet 40 daily  rosuvastatin 10 at bedtime    SOCIAL HISTORY:  No tobacco, etoh use    FAMILY HISTORY: Non contributory    REVIEW OF SYSTEMS  [  ] ROS unobtainable because:    [x  ] All other systems negative except as noted below:	    Constitutional:  [ ] fever [ ] chills  [ ] weight loss  [ ] weakness  Skin:  [ ] rash [ ] phlebitis	  Eyes: [ ] icterus [ ] pain  [ ] discharge	  ENMT: [ ] sore throat  [ ] thrush [ ] ulcers [ ] exudates  Respiratory: [ ] dyspnea [ ] hemoptysis [ ] cough [ ] sputum +Pain in right posterior chest wall, worse with coughing and deep inspiration	  Cardiovascular:  [ ] chest pain [ ] palpitations [ ] edema	  Gastrointestinal:  [ ] nausea [ ] vomiting [ ] diarrhea [ ] constipation [ ] pain	  Genitourinary:  [ ] dysuria [ ] frequency [ ] hematuria [ ] discharge [ ] flank pain  [ ] incontinence  Musculoskeletal:  [ ] myalgias [ ] arthralgias [ ] arthritis  [ ] back pain  Neurological:  [ ] headache [ ] seizures  [ ] confusion/altered mental status  Psychiatric:  [ ] anxiety [ ] depression	  Hematology/Lymphatics:  [ ] lymphadenopathy  Endocrine:  [ ] adrenal [ ] thyroid  Allergic/Immunologic:	 [ ] transplant [ ] seasonal    Vital Signs Last 24 Hrs  T(F): 98.1 (04-21-25 @ 04:48), Max: 99.8 (04-19-25 @ 08:24)  Vital Signs Last 24 Hrs  HR: 77 (04-21-25 @ 04:48) (77 - 104)  BP: 101/63 (04-21-25 @ 04:48) (91/74 - 108/75)  RR: 18 (04-21-25 @ 04:48)  SpO2: 95% (04-21-25 @ 04:48) (95% - 98%)  Wt(kg): --    PHYSICAL EXAM:    General: Patient seen sitting up on side of bed. NAD  HEENT: NCAT, EOMI, PERRL, no oral lesions  CV: S1+S2, no m/r/g appreciated   Lungs: No respiratory distress, CTAB  Abd: Soft, nontender, no guarding, no rebound tenderness, + bowel sounds   : No suprapubic tenderness  Neuro: Alert and oriented to time, place and person. Moves all extremities against gravity.  Ext: No cyanosis, no edema  Skin: No rash, no phlebitis                              14.0   21.05 )-----------( 175      ( 21 Apr 2025 05:50 )             42.5   04-21    137  |  100  |  34[H]  ----------------------------<  102[H]  4.6   |  23  |  1.03    Ca    9.4      21 Apr 2025 05:50  Phos  2.8     04-21  Mg     2.10     04-21    TPro  6.0  /  Alb  3.0[L]  /  TBili  0.9  /  DBili  x   /  AST  25  /  ALT  17  /  AlkPhos  89  04-21        MICROBIOLOGY:  Urinalysis with Rflx Culture (collected 19 Apr 2025 15:48)    Culture - Blood (collected 19 Apr 2025 08:00)  Source: Blood Blood-Venous  Gram Stain (20 Apr 2025 05:40):    Growth in aerobic bottle: Gram Positive Cocci in Pairs and Chains    Growth in anaerobic bottle: Gram Positive Cocci in Pairs and Chains  Preliminary Report (20 Apr 2025 18:31):    Growth in aerobic and anaerobic bottles: Streptococcus pneumoniae    Direct identification is available within approximately 3-5    hours either by Blood Panel Multiplexed PCR or Direct    MALDI-TOF. Details: [email or web address]  Organism: Blood Culture PCR (20 Apr 2025 04:33)  Organism: Blood Culture PCR (20 Apr 2025 04:33)      -  Streptococcus pneumoniae: Detec      Method Type: PCR    Culture - Blood (collected 19 Apr 2025 07:30)  Source: Blood Blood-Peripheral  Gram Stain (20 Apr 2025 02:09):    Growth in aerobic bottle: Gram Positive Cocci in Pairs and Chains    Growth in anaerobic bottle: Gram Positive Cocci in Pairs and Chains  Preliminary Report (20 Apr 2025 18:21):    Growth in aerobic and anaerobic bottles: Streptococcus pneumoniae    See previous culture [id number]              Rapid RVP Result: NotDetec (04-19 @ 07:20)      RADIOLOGY:  imaging below personally reviewed and agree with findings    < from: CT Abdomen and Pelvis w/ IV Cont (04.19.25 @ 09:55) >    ACC: [id number] EXAM:  CT ABDOMEN AND PELVIS IC   ORDERED BY: MILLI MONACO     ACC: [id number] [facility]   ORDERED BY: MILLI MONACO     PROCEDURE DATE:  04/19/2025          INTERPRETATION:  CLINICAL INFORMATION: Shortness of breath with chest   pain. Right flank pain.    COMPARISON: CT abdomen and pelvis 12/8/2017    CONTRAST/COMPLICATIONS:  IV Contrast: Omnipaque 350  90 cc administered   10 cc discarded  Oral Contrast: NONE  .    PROCEDURE:  CT Angiography of the Chest was performed followed by portal venous phase   imaging of the Abdomen and Pelvis.  Sagittal and coronal reformats were performed as well as 3D (MIP)   reconstructions.    FINDINGS:  CHEST:  LUNGS AND LARGE AIRWAYS: Trace mucus in the right bronchus. Right lower   lobe airways are obstructed with debris. Consolidation in the right lower   lobe suspicious for pneumonia. Left basilar linear atelectasis.  PLEURA: No pleural effusion.  VESSELS: Aortic calcifications. Coronary artery calcifications.   Borderline ectatic ascending aorta measuring 4.0 cm. There is appropriate   contrast opacification of the pulmonary arteries. No filling defect of   the main, lobar, segmental, and subsegmental pulmonary arteries to   suggest pulmonary embolism.  HEART: Heartsize is normal. No pericardial effusion.  MEDIASTINUM AND ADELITA: No lymphadenopathy.  CHEST WALL AND LOWER NECK: Cardiac loop recorder. Diminutive thyroid.    ABDOMEN AND PELVIS:  LIVER: Hepatic cyst. Stable hemangioma measuring 2.4 cm.  BILE DUCTS: Normal caliber.  GALLBLADDER: Within normal limits.  SPLEEN: Within normal limits.  PANCREAS: Fatty atrophy of the pancreas. Uncinate process pancreatic   cysts measuring 3.1 x 1.8 cm (303-44), increased in size since 2017 which   measured 1.1 x 0.8 cm.  ADRENALS: Stable 1.0 cm right adrenal nodule.  KIDNEYS/URETERS: Right renal cyst. No hydronephrosis. No stones. Normal   cortical enhancement.    BLADDER: Within normal limits.  REPRODUCTIVE ORGANS: Prostate is enlarged.    BOWEL: Colonic diverticulosis. Appendix is not visualized. No evidence of   inflammation in the pericecal region.  PERITONEUM/RETROPERITONEUM: Within normal limits.  VESSELS: Atherosclerotic changes. Retroaortic left renal vein.  LYMPH NODES: No lymphadenopathy.  ABDOMINAL WALL: Postsurgical changes. Lower pelvic bilateral inguinal   hernia repair.  BONES: Degenerative changes. 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    IMPRESSION:  No pulmonary embolism.    Right lower lobe consolidation with obstructed right lower lobe airways,   likely pneumonia, possibly secondary to aspiration.    Uncinate process pancreatic cyst measuring 3.1 cm, increased in size   since 2017.        --- End of Report ---

## 2025-04-21 NOTE — DIETITIAN INITIAL EVALUATION ADULT - OTHER INFO
79 year old male w/ a PMH of HTN, HLD, COPD, paroxysmal A-fib with loop recorder not on AC c/o chest pain, fatigue, shortness of breath for the last day. Patient meeting sepsis criteria with leukocytosis, hypotension, borderline bandemia, with a lactate, findings on CTA PE notable for RLL pneumonia per chart.    Patient seen during breakfast. Consumed a few bites due to N/V, noted to receive Reglan per chart. Food preferences reviewed. Has no food allergies. UBW is between 150-160 lbs. ABW is 155.4 lbs. (4/19) per chart indicating stable weight. No edema or pressure injuries noted per RN flow sheet.

## 2025-04-21 NOTE — PROGRESS NOTE ADULT - PROBLEM SELECTOR PLAN 7
Patient takes HCTZ 12.5mg daily and metoprolol 12.5 every other day    PLAN:  - Hold home meds iso hypotension Patient takes HCTZ 12.5mg daily and metoprolol 12.5 every other day    PLAN:  - Hold home HCTZ   - Restart home metoprolol

## 2025-04-21 NOTE — PROGRESS NOTE ADULT - TIME BILLING
Time spent includes direct patient care  (interview and examination of patient), discussion with other providers, support staff and/or patient's family members, review of medical records, ordering diagnostic tests and analyzing results, and documentation. Review of laboratory data, radiology results, consultants' recommendations, documentation in [place], discussion with patient/advanced care providers and interdisciplinary staff (such as , social workers, etc). Interventions were performed as documented above.

## 2025-04-21 NOTE — PROGRESS NOTE ADULT - PROBLEM SELECTOR PLAN 1
CTA PE notable for RLL pneumonia and left linear atelectasis  s/p CTX and azithromycin in the ED  full RVP negative  CXR clear  bcx strep pneumo pending sensitivities    PLAN:  - f/u urine strep, legionella, MRSA swab  - can de-escalate to ceftriaxone given bcx with strep pneumo (pending sensitivities)  - cw prednisone 40mg x 5  - for pleuritic chest pain can give oral tylenol for mild pain, IV tylenol for moderate pain (cannot keep in as prn), oxycodone 2.5mg for severe pain  - give additional 500cc today given poor PO intake CTA PE notable for RLL pneumonia and left linear atelectasis  s/p CTX and azithromycin in the ED  full RVP negative  CXR clear  bcx strep pneumo pending sensitivities    PLAN:  - urine strep, legionella -> negative   - Nasal swab positive for staph aureus   - cw ctx 2g q24 per ID, plan for total 14 days of abx   - dc steroids given bacteremia   - pain regimen: PO tylenol q6 --> oxy 2.5 q4 prn for moderate pain --> oxy 5 q4 prn for severe pain   - zofran 4mg PO prn for nausea

## 2025-04-22 DIAGNOSIS — R10.9 UNSPECIFIED ABDOMINAL PAIN: ICD-10-CM

## 2025-04-22 LAB
ANION GAP SERPL CALC-SCNC: 14 MMOL/L — SIGNIFICANT CHANGE UP (ref 7–14)
APPEARANCE UR: CLEAR — SIGNIFICANT CHANGE UP
BASOPHILS # BLD AUTO: 0.14 K/UL — SIGNIFICANT CHANGE UP (ref 0–0.2)
BASOPHILS NFR BLD AUTO: 0.6 % — SIGNIFICANT CHANGE UP (ref 0–2)
BILIRUB UR-MCNC: NEGATIVE — SIGNIFICANT CHANGE UP
BUN SERPL-MCNC: 29 MG/DL — HIGH (ref 7–23)
CALCIUM SERPL-MCNC: 9.2 MG/DL — SIGNIFICANT CHANGE UP (ref 8.4–10.5)
CHLORIDE SERPL-SCNC: 99 MMOL/L — SIGNIFICANT CHANGE UP (ref 98–107)
CO2 SERPL-SCNC: 22 MMOL/L — SIGNIFICANT CHANGE UP (ref 22–31)
COLOR SPEC: YELLOW — SIGNIFICANT CHANGE UP
CREAT SERPL-MCNC: 0.97 MG/DL — SIGNIFICANT CHANGE UP (ref 0.5–1.3)
DIFF PNL FLD: NEGATIVE — SIGNIFICANT CHANGE UP
EGFR: 79 ML/MIN/1.73M2 — SIGNIFICANT CHANGE UP
EGFR: 79 ML/MIN/1.73M2 — SIGNIFICANT CHANGE UP
EOSINOPHIL # BLD AUTO: 1.16 K/UL — HIGH (ref 0–0.5)
EOSINOPHIL NFR BLD AUTO: 5.1 % — SIGNIFICANT CHANGE UP (ref 0–6)
GLUCOSE SERPL-MCNC: 78 MG/DL — SIGNIFICANT CHANGE UP (ref 70–99)
GLUCOSE UR QL: NEGATIVE MG/DL — SIGNIFICANT CHANGE UP
HCT VFR BLD CALC: 41.3 % — SIGNIFICANT CHANGE UP (ref 39–50)
HGB BLD-MCNC: 13.6 G/DL — SIGNIFICANT CHANGE UP (ref 13–17)
IANC: 18.74 K/UL — HIGH (ref 1.8–7.4)
IMM GRANULOCYTES NFR BLD AUTO: 2.6 % — HIGH (ref 0–0.9)
KETONES UR-MCNC: NEGATIVE MG/DL — SIGNIFICANT CHANGE UP
LEUKOCYTE ESTERASE UR-ACNC: NEGATIVE — SIGNIFICANT CHANGE UP
LYMPHOCYTES # BLD AUTO: 0.6 K/UL — LOW (ref 1–3.3)
LYMPHOCYTES # BLD AUTO: 2.7 % — LOW (ref 13–44)
MAGNESIUM SERPL-MCNC: 2.1 MG/DL — SIGNIFICANT CHANGE UP (ref 1.6–2.6)
MCHC RBC-ENTMCNC: 29.2 PG — SIGNIFICANT CHANGE UP (ref 27–34)
MCHC RBC-ENTMCNC: 32.9 G/DL — SIGNIFICANT CHANGE UP (ref 32–36)
MCV RBC AUTO: 88.8 FL — SIGNIFICANT CHANGE UP (ref 80–100)
MONOCYTES # BLD AUTO: 1.4 K/UL — HIGH (ref 0–0.9)
MONOCYTES NFR BLD AUTO: 6.2 % — SIGNIFICANT CHANGE UP (ref 2–14)
NEUTROPHILS # BLD AUTO: 18.74 K/UL — HIGH (ref 1.8–7.4)
NEUTROPHILS NFR BLD AUTO: 82.8 % — HIGH (ref 43–77)
NITRITE UR-MCNC: NEGATIVE — SIGNIFICANT CHANGE UP
NRBC # BLD AUTO: 0 K/UL — SIGNIFICANT CHANGE UP (ref 0–0)
NRBC # FLD: 0 K/UL — SIGNIFICANT CHANGE UP (ref 0–0)
NRBC BLD AUTO-RTO: 0 /100 WBCS — SIGNIFICANT CHANGE UP (ref 0–0)
PH UR: 6 — SIGNIFICANT CHANGE UP (ref 5–8)
PHOSPHATE SERPL-MCNC: 2.2 MG/DL — LOW (ref 2.5–4.5)
PLATELET # BLD AUTO: 250 K/UL — SIGNIFICANT CHANGE UP (ref 150–400)
POTASSIUM SERPL-MCNC: 4.5 MMOL/L — SIGNIFICANT CHANGE UP (ref 3.5–5.3)
POTASSIUM SERPL-SCNC: 4.5 MMOL/L — SIGNIFICANT CHANGE UP (ref 3.5–5.3)
PROT UR-MCNC: SIGNIFICANT CHANGE UP MG/DL
RBC # BLD: 4.65 M/UL — SIGNIFICANT CHANGE UP (ref 4.2–5.8)
RBC # FLD: 14.3 % — SIGNIFICANT CHANGE UP (ref 10.3–14.5)
SODIUM SERPL-SCNC: 135 MMOL/L — SIGNIFICANT CHANGE UP (ref 135–145)
SP GR SPEC: 1.02 — SIGNIFICANT CHANGE UP (ref 1–1.03)
UROBILINOGEN FLD QL: 0.2 MG/DL — SIGNIFICANT CHANGE UP (ref 0.2–1)
WBC # BLD: 22.62 K/UL — HIGH (ref 3.8–10.5)
WBC # FLD AUTO: 22.62 K/UL — HIGH (ref 3.8–10.5)

## 2025-04-22 PROCEDURE — 99233 SBSQ HOSP IP/OBS HIGH 50: CPT | Mod: GC

## 2025-04-22 PROCEDURE — G0545: CPT

## 2025-04-22 PROCEDURE — 99232 SBSQ HOSP IP/OBS MODERATE 35: CPT

## 2025-04-22 PROCEDURE — 76770 US EXAM ABDO BACK WALL COMP: CPT | Mod: 26

## 2025-04-22 PROCEDURE — 74177 CT ABD & PELVIS W/CONTRAST: CPT | Mod: 26

## 2025-04-22 RX ORDER — KETOROLAC TROMETHAMINE 30 MG/ML
15 INJECTION, SOLUTION INTRAMUSCULAR; INTRAVENOUS EVERY 8 HOURS
Refills: 0 | Status: DISCONTINUED | OUTPATIENT
Start: 2025-04-22 | End: 2025-04-24

## 2025-04-22 RX ORDER — CYCLOBENZAPRINE HYDROCHLORIDE 15 MG/1
5 CAPSULE, EXTENDED RELEASE ORAL THREE TIMES A DAY
Refills: 0 | Status: COMPLETED | OUTPATIENT
Start: 2025-04-22 | End: 2025-04-24

## 2025-04-22 RX ORDER — KETOROLAC TROMETHAMINE 30 MG/ML
15 INJECTION, SOLUTION INTRAMUSCULAR; INTRAVENOUS ONCE
Refills: 0 | Status: DISCONTINUED | OUTPATIENT
Start: 2025-04-22 | End: 2025-04-22

## 2025-04-22 RX ADMIN — Medication 81 MILLIGRAM(S): at 13:08

## 2025-04-22 RX ADMIN — KETOROLAC TROMETHAMINE 15 MILLIGRAM(S): 30 INJECTION, SOLUTION INTRAMUSCULAR; INTRAVENOUS at 15:54

## 2025-04-22 RX ADMIN — LIDOCAINE HYDROCHLORIDE 1 PATCH: 20 JELLY TOPICAL at 19:00

## 2025-04-22 RX ADMIN — DEXTROMETHORPHAN HBR, GUAIFENESIN 1200 MILLIGRAM(S): 200 LIQUID ORAL at 06:57

## 2025-04-22 RX ADMIN — OXYCODONE HYDROCHLORIDE 5 MILLIGRAM(S): 30 TABLET ORAL at 02:45

## 2025-04-22 RX ADMIN — OXYCODONE HYDROCHLORIDE 5 MILLIGRAM(S): 30 TABLET ORAL at 19:03

## 2025-04-22 RX ADMIN — OXYCODONE HYDROCHLORIDE 5 MILLIGRAM(S): 30 TABLET ORAL at 01:57

## 2025-04-22 RX ADMIN — CYCLOBENZAPRINE HYDROCHLORIDE 5 MILLIGRAM(S): 15 CAPSULE, EXTENDED RELEASE ORAL at 14:22

## 2025-04-22 RX ADMIN — LEVALBUTEROL HYDROCHLORIDE 1 PUFF(S): 1.25 SOLUTION RESPIRATORY (INHALATION) at 08:58

## 2025-04-22 RX ADMIN — KETOROLAC TROMETHAMINE 15 MILLIGRAM(S): 30 INJECTION, SOLUTION INTRAMUSCULAR; INTRAVENOUS at 16:25

## 2025-04-22 RX ADMIN — OXYCODONE HYDROCHLORIDE 5 MILLIGRAM(S): 30 TABLET ORAL at 13:00

## 2025-04-22 RX ADMIN — METOPROLOL SUCCINATE 12.5 MILLIGRAM(S): 50 TABLET, EXTENDED RELEASE ORAL at 06:01

## 2025-04-22 RX ADMIN — OXYCODONE HYDROCHLORIDE 5 MILLIGRAM(S): 30 TABLET ORAL at 13:30

## 2025-04-22 RX ADMIN — KETOROLAC TROMETHAMINE 15 MILLIGRAM(S): 30 INJECTION, SOLUTION INTRAMUSCULAR; INTRAVENOUS at 09:26

## 2025-04-22 RX ADMIN — KETOROLAC TROMETHAMINE 15 MILLIGRAM(S): 30 INJECTION, SOLUTION INTRAMUSCULAR; INTRAVENOUS at 08:56

## 2025-04-22 RX ADMIN — KETOROLAC TROMETHAMINE 15 MILLIGRAM(S): 30 INJECTION, SOLUTION INTRAMUSCULAR; INTRAVENOUS at 23:09

## 2025-04-22 RX ADMIN — CEFTRIAXONE 100 MILLIGRAM(S): 500 INJECTION, POWDER, FOR SOLUTION INTRAMUSCULAR; INTRAVENOUS at 13:07

## 2025-04-22 RX ADMIN — MUPIROCIN CALCIUM 1 APPLICATION(S): 20 CREAM TOPICAL at 17:53

## 2025-04-22 RX ADMIN — ROSUVASTATIN CALCIUM 10 MILLIGRAM(S): 20 TABLET, FILM COATED ORAL at 22:39

## 2025-04-22 RX ADMIN — LIDOCAINE HYDROCHLORIDE 1 PATCH: 20 JELLY TOPICAL at 13:02

## 2025-04-22 RX ADMIN — CYCLOBENZAPRINE HYDROCHLORIDE 5 MILLIGRAM(S): 15 CAPSULE, EXTENDED RELEASE ORAL at 22:39

## 2025-04-22 RX ADMIN — OXYCODONE HYDROCHLORIDE 5 MILLIGRAM(S): 30 TABLET ORAL at 06:00

## 2025-04-22 RX ADMIN — DEXTROMETHORPHAN HBR, GUAIFENESIN 1200 MILLIGRAM(S): 200 LIQUID ORAL at 17:53

## 2025-04-22 RX ADMIN — Medication 1 APPLICATION(S): at 06:03

## 2025-04-22 RX ADMIN — MUPIROCIN CALCIUM 1 APPLICATION(S): 20 CREAM TOPICAL at 06:06

## 2025-04-22 RX ADMIN — LIDOCAINE HYDROCHLORIDE 1 PATCH: 20 JELLY TOPICAL at 01:01

## 2025-04-22 RX ADMIN — KETOROLAC TROMETHAMINE 15 MILLIGRAM(S): 30 INJECTION, SOLUTION INTRAMUSCULAR; INTRAVENOUS at 22:39

## 2025-04-22 RX ADMIN — OXYCODONE HYDROCHLORIDE 5 MILLIGRAM(S): 30 TABLET ORAL at 06:50

## 2025-04-22 RX ADMIN — Medication 1 TABLET(S): at 13:00

## 2025-04-22 RX ADMIN — OXYCODONE HYDROCHLORIDE 5 MILLIGRAM(S): 30 TABLET ORAL at 19:33

## 2025-04-22 NOTE — SWALLOW BEDSIDE ASSESSMENT ADULT - ASR SWALLOW RECOMMEND DIAG
Consider Cinesophagram given CT Chest findings with concern for aspiration pneumonia/rule out silent aspiration/VFSS/MBS

## 2025-04-22 NOTE — PROGRESS NOTE ADULT - PROBLEM SELECTOR PLAN 10
DVT ppx: SCD (patient refusing medical dvt ppx)  Diet: DASH  Dispo: No PT, pending culture sensitivities, repeat Bcx, and pain control Patient with one episode of paroxysmal afib in the setting of taking albuterol inhaler frequently over the course of one day.  Per patient, no further episodes of afib recorded with ILR  Not on AC    PLAN:  - Restart home metoprolol for hx of afib   - Sepsis resolved Patient with one episode of paroxysmal afib in the setting of taking albuterol inhaler frequently over the course of one day.  Per patient, no further episodes of afib recorded with ILR  Not on AC    PLAN:  - Restart home metoprolol for hx of afib

## 2025-04-22 NOTE — PROGRESS NOTE ADULT - PROBLEM SELECTOR PLAN 2
Meeting sepsis criteria with leukocytosis, hypotension, lactate, borderline bandemia  s/p 2.5L IVF in ED  s/p CTX and azithromycin in ED  bacteremic with strep pneumo pending sensitivities    PLAN:  - antibiotics as above, will narrow pending infectious workup  - Repeat Bcx sent 4/21 - New R flank/lower back pain developed over the weekend   - Severe, very tender to touch  - Afebrile but with continued leukocytosis despite cultures growing pansensitive strep pneumo  - Concern for stone vs obstruction vs abscess/other intraabdominal infection vs MSK pain    Plan:   - CT AP w/ contrast for infection r/o   - US kidney bladder for obstruction and r/o stones   - Repeat UA  - Schedule 5mg Flexeril TID for 2 days

## 2025-04-22 NOTE — PROGRESS NOTE ADULT - PROBLEM SELECTOR PLAN 1
CTA PE notable for RLL pneumonia and left linear atelectasis  s/p CTX and azithromycin in the ED  full RVP negative  CXR clear  bcx strep pneumo pending sensitivities    PLAN:  - urine strep, legionella -> negative   - Nasal swab positive for staph aureus   - cw ctx 2g q24 per ID, plan for total 14 days of abx   - dc steroids given bacteremia   - pain regimen: PO tylenol q6 --> oxy 2.5 q4 prn for moderate pain --> oxy 5 q4 prn for severe pain   - zofran 4mg PO prn for nausea CTA PE notable for RLL pneumonia and left linear atelectasis  s/p CTX and azithromycin in the ED  full RVP negative  CXR clear  bcx strep pneumo pansensitive     PLAN:  - urine strep, legionella -> negative   - Nasal swab positive for staph aureus   - cw ctx 2g q24 per ID, plan for total 14 days of abx, DC with PO amoxicillin to complete 14 total days  - pain regimen: PO tylenol q6 --> oxy 2.5 q4 prn for moderate pain --> oxy 5 q4 prn for severe pain   - zofran 4mg PO prn for nausea  - speech eval given RLL PNA, concern for possible aspiration CTA PE notable for RLL pneumonia and left linear atelectasis  s/p CTX and azithromycin in the ED  full RVP negative  CXR clear  bcx strep pneumo pansensitive     PLAN:  - urine strep, legionella -> negative   - Nasal swab positive for staph aureus   - cw ctx 2g q24 per ID, plan for total 14 days of abx, dc abx recs per ID  - pain regimen: PO tylenol q6 --> oxy 2.5 q4 prn for moderate pain --> oxy 5 q4 prn for severe pain   - zofran 4mg PO prn for nausea  - speech eval given RLL PNA, concern for possible aspiration

## 2025-04-22 NOTE — PROGRESS NOTE ADULT - TIME BILLING
Review of laboratory data, radiology results, consultants' recommendations, documentation in [place], discussion with patient/advanced care providers and interdisciplinary staff (such as , social workers, etc). Interventions were performed as documented above.

## 2025-04-22 NOTE — PROGRESS NOTE ADULT - ASSESSMENT
Patient is a 78 y/o male h/o HTN, HLD, COPD, paroxysmal A-fib with loop recorder not on AC c/o chest pain, fatigue, shortness of breath for the last day. Patient meeting sepsis criteria with leukocytosis, hypotension, borderline bandemia, with a lactate, findings on CTA PE notable for RLL pneumonia. ID consulted, likely will need 2 weeks of total abx. Pending culture sensitivities.  Patient is a 80 y/o male h/o HTN, HLD, COPD, paroxysmal A-fib with loop recorder not on AC c/o chest pain, fatigue, shortness of breath for the last day. Patient meeting sepsis criteria with leukocytosis, hypotension, borderline bandemia, with a lactate, findings on CTA PE notable for RLL pneumonia. ID consulted, likely will need 2 weeks of total abx, cultures with pansensitive strep pneumo. Pending repeat imaging for severe new R lower back pain.

## 2025-04-22 NOTE — PROGRESS NOTE ADULT - PROBLEM SELECTOR PLAN 4
Likely pre-renal in the setting of poor PO intake and hypotension  s/p 2.5L IVF  Baseline creatinine ~1.0  Urine studies indicate prerenal KEVIN    Plan:   - Encourage PO intake  - Avoid nephrotoxic medications  - Monitor I/O  - RESOLVED Patient with history of COPD, nonsmoker, but worked as a  for many years  Does not require home O2, has an albuterol inhaler that he never uses  No wheezing on exam   RESOLVED    PLAN:   - DC prednisone  - xopenex Q6H PRN for wheezing   - cw Ceftriaxone for PNA   - TTE --> grossly unremarkable Patient with history of COPD, nonsmoker, but worked as a  for many years  Does not require home O2, has an albuterol inhaler that he never uses  No wheezing on exam   RESOLVED    PLAN:   - DC prednisone  - xopenex Q6H PRN for wheezing   - TTE --> grossly unremarkable

## 2025-04-22 NOTE — PROGRESS NOTE ADULT - PROBLEM SELECTOR PLAN 5
Tbili 3.8  LFTs wnl   Possibly Gilbert's or other benign process    PLAN  - downtrending Likely pre-renal in the setting of poor PO intake and hypotension  s/p 2.5L IVF  Baseline creatinine ~1.0  Urine studies indicate prerenal KEVIN    Plan:   - Encourage PO intake  - Avoid nephrotoxic medications  - Monitor I/O  - RESOLVED

## 2025-04-22 NOTE — PROGRESS NOTE ADULT - PROBLEM SELECTOR PLAN 9
Patient with one episode of paroxysmal afib in the setting of taking albuterol inhaler frequently over the course of one day.  Per patient, no further episodes of afib recorded with ILR  Not on AC    PLAN:  - Restart home metoprolol for hx of afib   - Sepsis resolved - cw rosuvastatin 10mg

## 2025-04-22 NOTE — PROGRESS NOTE ADULT - PROBLEM SELECTOR PLAN 3
Patient with history of COPD, nonsmoker, but worked as a  for many years  Does not require home O2, has an albuterol inhaler that he never uses  No wheezing on exam     PLAN:   - DC prednisone  - xopenex Q6H PRN for wheezing   - cw Ceftriaxone for PNA   - TTE --> grossly unremarkable Meeting sepsis criteria with leukocytosis, hypotension, lactate, borderline bandemia  s/p 2.5L IVF in ED  s/p CTX and azithromycin in ED  bacteremic with strep pneumo pending sensitivities    PLAN:  - antibiotics as above, will narrow pending infectious workup  - Repeat Bcx sent 4/21 Meeting sepsis criteria with leukocytosis, hypotension, lactate, borderline bandemia  s/p 2.5L IVF in ED  s/p CTX and azithromycin in ED  bacteremic with strep pneumo pending sensitivities    PLAN:  - antibiotics as above  - Repeat Bcx sent 4/21, NGTD

## 2025-04-22 NOTE — PROGRESS NOTE ADULT - ASSESSMENT
This is a 80 y/o M w/ PMHx of HTN, HLD, COPD, pAF w/ loop recorder admitted to [facility] on 4/19/25 for chills, CP, SOB, found to be hypotensive, hypoxic, WBC 32.   CTA Chest w/o PE, had RLL consolidation, c/f PNA, started on Ceftriaxone/Azithromycin.   BCx w/ Strep pneumoniae.    #Strep pneumoniae bacteremia 2/2 RLL PNA   #Septic shock  #Hypoxic respiratory failure   #KEVIN, improving    Overall, 80 y/o M w/ PMhx of HTN, HLD, COPD, pAF w/ loop recorder admitted to [facility] on 4/19/25 for Strep pneumoniae bacteremia 2/2 RLL PNA. Still w/ leukocytosis and R sided pain.      Recommendations:   1. Ceftriaxone 2 g q24, likely 14 day course, can potentially finish with oral abx   2. F/u BCx sent 4/21   3. TTE w/o IE   4. Will need repeat CT Chest in 2-3 months    Thank you for consulting us and involving us in the management of this patient's case. In addition to reviewing history, imaging, documents, labs, microbiology, and infection control strategies and potential issues.     ID will continue to follow    Bernard Ayala M.D.  Attending Physician  Division of Infectious Diseases  Department of Medicine    Please contact through MS Teams message.  Office: [phone] (after 5 PM or weekend) .

## 2025-04-22 NOTE — PROGRESS NOTE ADULT - PROBLEM SELECTOR PLAN 8
- cw rosuvastatin 10mg Patient takes HCTZ 12.5mg daily and metoprolol 12.5 every other day    PLAN:  - Hold home HCTZ   - Restart home metoprolol Patient takes HCTZ 12.5mg daily and metoprolol 12.5 every other day    PLAN:  - Hold home HCTZ   - Cw home metoprolol

## 2025-04-22 NOTE — PROGRESS NOTE ADULT - SUBJECTIVE AND OBJECTIVE BOX
***************************************************************  Cammy Moreira, PGY1  Internal Medicine   Available on [employer] TEAMS  ***************************************************************    IDALMIS MONTEZ  79y  MRN: [id number]    Patient is a 79y old  Male who presents with a chief complaint of Pneumonia (21 Apr 2025 10:45)      Interval/Overnight Events: no events ON.     Subjective: Pt seen and examined at bedside. Denies fever, CP, SOB, abn pain, N/V, dysuria. Tolerating diet.      MEDICATIONS  (STANDING):  aspirin enteric coated 81 milliGRAM(s) Oral daily  cefTRIAXone   IVPB 2000 milliGRAM(s) IV Intermittent every 24 hours  chlorhexidine 2% Cloths 1 Application(s) Topical <User Schedule>  guaiFENesin ER 1200 milliGRAM(s) Oral every 12 hours  lidocaine   4% Patch 1 Patch Transdermal daily  metoprolol succinate ER 12.5 milliGRAM(s) Oral daily  multivitamin 1 Tablet(s) Oral daily  mupirocin 2% Nasal 1 Application(s) Both Nostrils two times a day  rosuvastatin 10 milliGRAM(s) Oral at bedtime    MEDICATIONS  (PRN):  acetaminophen     Tablet .. 650 milliGRAM(s) Oral every 6 hours PRN Temp greater or equal to 38C (100.4F), Mild Pain (1 - 3)  benzonatate 100 milliGRAM(s) Oral three times a day PRN Cough  levalbuterol 45 MICROgram(s) HFA Inhaler 1 Puff(s) Inhalation every 6 hours PRN wheezing  ondansetron    Tablet 4 milliGRAM(s) Oral every 6 hours PRN Nausea and/or Vomiting  oxyCODONE    IR 5 milliGRAM(s) Oral every 4 hours PRN Severe Pain (7 - 10)  oxyCODONE    IR 2.5 milliGRAM(s) Oral every 4 hours PRN Moderate Pain (4 - 6)      Objective:    Vitals: Vital Signs Last 24 Hrs  T(C): 36.3 (04-22-25 @ 05:01), Max: 36.4 (04-21-25 @ 21:37)  T(F): 97.3 (04-22-25 @ 05:01), Max: 97.5 (04-21-25 @ 21:37)  HR: 94 (04-22-25 @ 05:53) (75 - 100)  BP: 109/65 (04-22-25 @ 05:53) (104/60 - 127/78)  BP(mean): --  RR: 17 (04-22-25 @ 05:53) (17 - 18)  SpO2: 97% (04-22-25 @ 05:53) (97% - 100%)                I&O's Summary    21 Apr 2025 07:01  -  22 Apr 2025 07:00  --------------------------------------------------------  IN: 400 mL / OUT: 420 mL / NET: -20 mL        PHYSICAL EXAM:  GENERAL: NAD  HEAD:  Atraumatic, Normocephalic  EYES: EOMI, conjunctiva and sclera clear  CHEST/LUNG: Clear to auscultation bilaterally; No rales, rhonchi, wheezing, or rubs  HEART: Regular rate and rhythm; No murmurs, rubs, or gallops  ABDOMEN: Soft, Nontender, Nondistended;   SKIN: No rashes or lesions  NERVOUS SYSTEM:  Alert & Oriented X3, no focal deficits    LABS:                        14.0   21.05 )-----------( 175      ( 21 Apr 2025 05:50 )             42.5                         14.1   24.51 )-----------( 187      ( 20 Apr 2025 07:08 )             43.9                         15.1   32.01 )-----------( 215      ( 19 Apr 2025 07:45 )             45.3     04-21    137  |  100  |  34[H]  ----------------------------<  102[H]  4.6   |  23  |  1.03  04-20    137  |  102  |  43[H]  ----------------------------<  153[H]  4.0   |  21[L]  |  1.35[H]  04-19    133[L]  |  99  |  43[H]  ----------------------------<  151[H]  4.3   |  20[L]  |  1.90[H]    Ca    9.4      21 Apr 2025 05:50  Ca    9.2      20 Apr 2025 07:08  Ca    8.0[L]      19 Apr 2025 09:55  Phos  2.8     04-21  Mg     2.10     04-21    TPro  6.0  /  Alb  3.0[L]  /  TBili  0.9  /  DBili  x   /  AST  25  /  ALT  17  /  AlkPhos  89  04-21  TPro  6.6  /  Alb  3.2[L]  /  TBili  1.8[H]  /  DBili  x   /  AST  24  /  ALT  15  /  AlkPhos  87  04-20  TPro  x   /  Alb  x   /  TBili  3.2[H]  /  DBili  0.2  /  AST  x   /  ALT  x   /  AlkPhos  x   04-19    CAPILLARY BLOOD GLUCOSE            Urinalysis Basic - ( 21 Apr 2025 05:50 )    Color: x / Appearance: x / SG: x / pH: x  Gluc: 102 mg/dL / Ketone: x  / Bili: x / Urobili: x   Blood: x / Protein: x / Nitrite: x   Leuk Esterase: x / RBC: x / WBC x   Sq Epi: x / Non Sq Epi: x / Bacteria: x          RADIOLOGY & ADDITIONAL TESTS:    Imaging Personally Reviewed:  [x ] YES  [ ] NO    Consultants involved in case:   Consultant(s) Notes Reviewed:  [ x] YES  [ ] NO:   Care Discussed with Consultants/Other Providers [x ] YES  [ ] NO         ***************************************************************  Cammy Moreira, PGY1  Internal Medicine   Available on [employer] TEAMS  ***************************************************************    IDALMIS MONTEZ  79y  MRN: [id number]    Patient is a 79y old  Male who presents with a chief complaint of Pneumonia (21 Apr 2025 10:45)      Interval/Overnight Events: no events ON.     Subjective: Pt seen and examined at bedside. Still has severe 10/10 R lower back/flank pain, not controlled with PO oxy. R flank pain new from over the weekend. Nausea improved.     MEDICATIONS  (STANDING):  aspirin enteric coated 81 milliGRAM(s) Oral daily  cefTRIAXone   IVPB 2000 milliGRAM(s) IV Intermittent every 24 hours  chlorhexidine 2% Cloths 1 Application(s) Topical <User Schedule>  guaiFENesin ER 1200 milliGRAM(s) Oral every 12 hours  lidocaine   4% Patch 1 Patch Transdermal daily  metoprolol succinate ER 12.5 milliGRAM(s) Oral daily  multivitamin 1 Tablet(s) Oral daily  mupirocin 2% Nasal 1 Application(s) Both Nostrils two times a day  rosuvastatin 10 milliGRAM(s) Oral at bedtime    MEDICATIONS  (PRN):  acetaminophen     Tablet .. 650 milliGRAM(s) Oral every 6 hours PRN Temp greater or equal to 38C (100.4F), Mild Pain (1 - 3)  benzonatate 100 milliGRAM(s) Oral three times a day PRN Cough  levalbuterol 45 MICROgram(s) HFA Inhaler 1 Puff(s) Inhalation every 6 hours PRN wheezing  ondansetron    Tablet 4 milliGRAM(s) Oral every 6 hours PRN Nausea and/or Vomiting  oxyCODONE    IR 5 milliGRAM(s) Oral every 4 hours PRN Severe Pain (7 - 10)  oxyCODONE    IR 2.5 milliGRAM(s) Oral every 4 hours PRN Moderate Pain (4 - 6)      Objective:    Vitals: Vital Signs Last 24 Hrs  T(C): 36.3 (04-22-25 @ 05:01), Max: 36.4 (04-21-25 @ 21:37)  T(F): 97.3 (04-22-25 @ 05:01), Max: 97.5 (04-21-25 @ 21:37)  HR: 94 (04-22-25 @ 05:53) (75 - 100)  BP: 109/65 (04-22-25 @ 05:53) (104/60 - 127/78)  BP(mean): --  RR: 17 (04-22-25 @ 05:53) (17 - 18)  SpO2: 97% (04-22-25 @ 05:53) (97% - 100%)                I&O's Summary    21 Apr 2025 07:01  -  22 Apr 2025 07:00  --------------------------------------------------------  IN: 400 mL / OUT: 420 mL / NET: -20 mL        PHYSICAL EXAM:  GENERAL: In mild distress from pain  HEAD:  Atraumatic, Normocephalic  EYES: EOMI, conjunctiva and sclera clear  CHEST/LUNG: Decreased breath sounds in RLL, no rhonchi or wheezing   BACK: R lower back and flank region with severe pain to touch, no overlying skin changes   HEART: Regular rate and rhythm  ABDOMEN: Soft, Nontender, Nondistended;   SKIN: No rashes or lesions  NERVOUS SYSTEM:  Alert & Oriented X3, no focal deficits    LABS:                        14.0   21.05 )-----------( 175      ( 21 Apr 2025 05:50 )             42.5                         14.1   24.51 )-----------( 187      ( 20 Apr 2025 07:08 )             43.9                         15.1   32.01 )-----------( 215      ( 19 Apr 2025 07:45 )             45.3     04-21    137  |  100  |  34[H]  ----------------------------<  102[H]  4.6   |  23  |  1.03  04-20    137  |  102  |  43[H]  ----------------------------<  153[H]  4.0   |  21[L]  |  1.35[H]  04-19    133[L]  |  99  |  43[H]  ----------------------------<  151[H]  4.3   |  20[L]  |  1.90[H]    Ca    9.4      21 Apr 2025 05:50  Ca    9.2      20 Apr 2025 07:08  Ca    8.0[L]      19 Apr 2025 09:55  Phos  2.8     04-21  Mg     2.10     04-21    TPro  6.0  /  Alb  3.0[L]  /  TBili  0.9  /  DBili  x   /  AST  25  /  ALT  17  /  AlkPhos  89  04-21  TPro  6.6  /  Alb  3.2[L]  /  TBili  1.8[H]  /  DBili  x   /  AST  24  /  ALT  15  /  AlkPhos  87  04-20  TPro  x   /  Alb  x   /  TBili  3.2[H]  /  DBili  0.2  /  AST  x   /  ALT  x   /  AlkPhos  x   04-19    CAPILLARY BLOOD GLUCOSE            Urinalysis Basic - ( 21 Apr 2025 05:50 )    Color: x / Appearance: x / SG: x / pH: x  Gluc: 102 mg/dL / Ketone: x  / Bili: x / Urobili: x   Blood: x / Protein: x / Nitrite: x   Leuk Esterase: x / RBC: x / WBC x   Sq Epi: x / Non Sq Epi: x / Bacteria: x          RADIOLOGY & ADDITIONAL TESTS:    Imaging Personally Reviewed:  [x ] YES  [ ] NO    Consultants involved in case:   Consultant(s) Notes Reviewed:  [ x] YES  [ ] NO:   Care Discussed with Consultants/Other Providers [x ] YES  [ ] NO

## 2025-04-22 NOTE — PROGRESS NOTE ADULT - PROBLEM SELECTOR PLAN 11
DVT ppx: SCD (patient refusing medical dvt ppx)  Diet: DASH  Dispo: No PT, pending culture sensitivities, repeat Bcx, and pain control DVT ppx: SCD (patient refusing medical dvt ppx)  Diet: DASH  Dispo: No PT, pending R flank pain w/u

## 2025-04-22 NOTE — PROGRESS NOTE ADULT - PROBLEM SELECTOR PLAN 6
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    PLAN  - Follow up outpatient Tbili 3.8  LFTs wnl   Possibly Gilbert's or other benign process    PLAN  - downtrending
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
## 2025-04-22 NOTE — SWALLOW BEDSIDE ASSESSMENT ADULT - COMMENTS
Progress Note- Internal Medicine 4/22: "Patient is a 78 y/o male h/o HTN, HLD, COPD, paroxysmal A-fib with loop recorder not on AC c/o chest pain, fatigue, shortness of breath for the last day. Patient meeting sepsis criteria with leukocytosis, hypotension, borderline bandemia, with a lactate, findings on CTA PE notable for RLL pneumonia. ID consulted, likely will need 2 weeks of total abx, cultures with pansensitive strep pneumo. Pending repeat imaging for severe new R lower back pain."    CT Chest 4/19: "IMPRESSION: No pulmonary embolism. Right lower lobe consolidation with obstructed right lower lobe airways, likely pneumonia, possibly secondary to aspiration."    Patient seen awake/alert and oriented state this PM with patient's wife and son present at bedside. Patient denies any difficulty swallowing and states he eats regular solids at home. Patient reports reduced appetite since admission and noted with only partially completed meal tray however has been drinking Ensure as supplemental nutrition.

## 2025-04-22 NOTE — PROGRESS NOTE ADULT - PROBLEM SELECTOR PLAN 7
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    PLAN  - Follow up outpatient

## 2025-04-23 LAB
ADD ON TEST-SPECIMEN IN LAB: SIGNIFICANT CHANGE UP
ADD ON TEST-SPECIMEN IN LAB: SIGNIFICANT CHANGE UP
ALBUMIN SERPL ELPH-MCNC: 3 G/DL — LOW (ref 3.3–5)
ALP SERPL-CCNC: 104 U/L — SIGNIFICANT CHANGE UP (ref 40–120)
ALT FLD-CCNC: 28 U/L — SIGNIFICANT CHANGE UP (ref 4–41)
ANION GAP SERPL CALC-SCNC: 14 MMOL/L — SIGNIFICANT CHANGE UP (ref 7–14)
AST SERPL-CCNC: 36 U/L — SIGNIFICANT CHANGE UP (ref 4–40)
BASOPHILS # BLD AUTO: 0.02 K/UL — SIGNIFICANT CHANGE UP (ref 0–0.2)
BASOPHILS NFR BLD AUTO: 0.2 % — SIGNIFICANT CHANGE UP (ref 0–2)
BILIRUB DIRECT SERPL-MCNC: <0.2 MG/DL — SIGNIFICANT CHANGE UP (ref 0–0.3)
BILIRUB INDIRECT FLD-MCNC: >0.6 MG/DL — SIGNIFICANT CHANGE UP (ref 0–1)
BILIRUB SERPL-MCNC: 0.8 MG/DL — SIGNIFICANT CHANGE UP (ref 0.2–1.2)
BUN SERPL-MCNC: 23 MG/DL — SIGNIFICANT CHANGE UP (ref 7–23)
CALCIUM SERPL-MCNC: 9.1 MG/DL — SIGNIFICANT CHANGE UP (ref 8.4–10.5)
CHLORIDE SERPL-SCNC: 98 MMOL/L — SIGNIFICANT CHANGE UP (ref 98–107)
CK MB BLD-MCNC: 2.9 % — HIGH (ref 0–2.5)
CK MB CFR SERPL CALC: 2.2 NG/ML — SIGNIFICANT CHANGE UP
CK SERPL-CCNC: 77 U/L — SIGNIFICANT CHANGE UP (ref 30–200)
CO2 SERPL-SCNC: 21 MMOL/L — LOW (ref 22–31)
CREAT SERPL-MCNC: 0.97 MG/DL — SIGNIFICANT CHANGE UP (ref 0.5–1.3)
EGFR: 79 ML/MIN/1.73M2 — SIGNIFICANT CHANGE UP
EGFR: 79 ML/MIN/1.73M2 — SIGNIFICANT CHANGE UP
EOSINOPHIL # BLD AUTO: 0.07 K/UL — SIGNIFICANT CHANGE UP (ref 0–0.5)
EOSINOPHIL NFR BLD AUTO: 0.6 % — SIGNIFICANT CHANGE UP (ref 0–6)
GLUCOSE SERPL-MCNC: 73 MG/DL — SIGNIFICANT CHANGE UP (ref 70–99)
HCT VFR BLD CALC: 42.2 % — SIGNIFICANT CHANGE UP (ref 39–50)
HGB BLD-MCNC: 14.1 G/DL — SIGNIFICANT CHANGE UP (ref 13–17)
IANC: 8.5 K/UL — HIGH (ref 1.8–7.4)
IMM GRANULOCYTES NFR BLD AUTO: 8.1 % — HIGH (ref 0–0.9)
LIDOCAIN IGE QN: 14 U/L — SIGNIFICANT CHANGE UP (ref 7–60)
LYMPHOCYTES # BLD AUTO: 0.93 K/UL — LOW (ref 1–3.3)
LYMPHOCYTES # BLD AUTO: 8 % — LOW (ref 13–44)
MAGNESIUM SERPL-MCNC: 1.9 MG/DL — SIGNIFICANT CHANGE UP (ref 1.6–2.6)
MCHC RBC-ENTMCNC: 28.7 PG — SIGNIFICANT CHANGE UP (ref 27–34)
MCHC RBC-ENTMCNC: 33.4 G/DL — SIGNIFICANT CHANGE UP (ref 32–36)
MCV RBC AUTO: 85.9 FL — SIGNIFICANT CHANGE UP (ref 80–100)
MONOCYTES # BLD AUTO: 1.16 K/UL — HIGH (ref 0–0.9)
MONOCYTES NFR BLD AUTO: 10 % — SIGNIFICANT CHANGE UP (ref 2–14)
NEUTROPHILS # BLD AUTO: 8.5 K/UL — HIGH (ref 1.8–7.4)
NEUTROPHILS NFR BLD AUTO: 73.1 % — SIGNIFICANT CHANGE UP (ref 43–77)
NRBC # BLD AUTO: 0 K/UL — SIGNIFICANT CHANGE UP (ref 0–0)
NRBC # FLD: 0 K/UL — SIGNIFICANT CHANGE UP (ref 0–0)
NRBC BLD AUTO-RTO: 0 /100 WBCS — SIGNIFICANT CHANGE UP (ref 0–0)
PHOSPHATE SERPL-MCNC: 2.5 MG/DL — SIGNIFICANT CHANGE UP (ref 2.5–4.5)
PLATELET # BLD AUTO: 267 K/UL — SIGNIFICANT CHANGE UP (ref 150–400)
POTASSIUM SERPL-MCNC: 4 MMOL/L — SIGNIFICANT CHANGE UP (ref 3.5–5.3)
POTASSIUM SERPL-SCNC: 4 MMOL/L — SIGNIFICANT CHANGE UP (ref 3.5–5.3)
PROT SERPL-MCNC: 6.8 G/DL — SIGNIFICANT CHANGE UP (ref 6–8.3)
RBC # BLD: 4.91 M/UL — SIGNIFICANT CHANGE UP (ref 4.2–5.8)
RBC # FLD: 13.8 % — SIGNIFICANT CHANGE UP (ref 10.3–14.5)
SODIUM SERPL-SCNC: 133 MMOL/L — LOW (ref 135–145)
TROPONIN T, HIGH SENSITIVITY RESULT: 41 NG/L — SIGNIFICANT CHANGE UP
WBC # BLD: 11.62 K/UL — HIGH (ref 3.8–10.5)
WBC # FLD AUTO: 11.62 K/UL — HIGH (ref 3.8–10.5)

## 2025-04-23 PROCEDURE — 99233 SBSQ HOSP IP/OBS HIGH 50: CPT | Mod: GC

## 2025-04-23 PROCEDURE — G0545: CPT

## 2025-04-23 PROCEDURE — 99232 SBSQ HOSP IP/OBS MODERATE 35: CPT

## 2025-04-23 PROCEDURE — 93010 ELECTROCARDIOGRAM REPORT: CPT

## 2025-04-23 RX ORDER — BISACODYL 5 MG
10 TABLET, DELAYED RELEASE (ENTERIC COATED) ORAL ONCE
Refills: 0 | Status: COMPLETED | OUTPATIENT
Start: 2025-04-23 | End: 2025-04-23

## 2025-04-23 RX ORDER — HEPARIN SODIUM 1000 [USP'U]/ML
800 INJECTION INTRAVENOUS; SUBCUTANEOUS
Qty: 25000 | Refills: 0 | Status: DISCONTINUED | OUTPATIENT
Start: 2025-04-23 | End: 2025-04-24

## 2025-04-23 RX ORDER — HEPARIN SODIUM 1000 [USP'U]/ML
3000 INJECTION INTRAVENOUS; SUBCUTANEOUS EVERY 6 HOURS
Refills: 0 | Status: DISCONTINUED | OUTPATIENT
Start: 2025-04-23 | End: 2025-04-24

## 2025-04-23 RX ORDER — HEPARIN SODIUM 1000 [USP'U]/ML
6000 INJECTION INTRAVENOUS; SUBCUTANEOUS ONCE
Refills: 0 | Status: DISCONTINUED | OUTPATIENT
Start: 2025-04-23 | End: 2025-04-24

## 2025-04-23 RX ORDER — HEPARIN SODIUM 1000 [USP'U]/ML
6000 INJECTION INTRAVENOUS; SUBCUTANEOUS EVERY 6 HOURS
Refills: 0 | Status: DISCONTINUED | OUTPATIENT
Start: 2025-04-23 | End: 2025-04-24

## 2025-04-23 RX ORDER — SENNA 187 MG
2 TABLET ORAL AT BEDTIME
Refills: 0 | Status: DISCONTINUED | OUTPATIENT
Start: 2025-04-23 | End: 2025-04-29

## 2025-04-23 RX ORDER — POLYETHYLENE GLYCOL 3350 17 G/17G
17 POWDER, FOR SOLUTION ORAL
Refills: 0 | Status: DISCONTINUED | OUTPATIENT
Start: 2025-04-23 | End: 2025-04-29

## 2025-04-23 RX ADMIN — MUPIROCIN CALCIUM 1 APPLICATION(S): 20 CREAM TOPICAL at 17:46

## 2025-04-23 RX ADMIN — CYCLOBENZAPRINE HYDROCHLORIDE 5 MILLIGRAM(S): 15 CAPSULE, EXTENDED RELEASE ORAL at 06:19

## 2025-04-23 RX ADMIN — KETOROLAC TROMETHAMINE 15 MILLIGRAM(S): 30 INJECTION, SOLUTION INTRAMUSCULAR; INTRAVENOUS at 06:19

## 2025-04-23 RX ADMIN — DEXTROMETHORPHAN HBR, GUAIFENESIN 1200 MILLIGRAM(S): 200 LIQUID ORAL at 17:46

## 2025-04-23 RX ADMIN — Medication 2 MILLIGRAM(S): at 18:23

## 2025-04-23 RX ADMIN — LIDOCAINE HYDROCHLORIDE 1 PATCH: 20 JELLY TOPICAL at 01:00

## 2025-04-23 RX ADMIN — LIDOCAINE HYDROCHLORIDE 1 PATCH: 20 JELLY TOPICAL at 14:13

## 2025-04-23 RX ADMIN — Medication 2 MILLIGRAM(S): at 12:54

## 2025-04-23 RX ADMIN — KETOROLAC TROMETHAMINE 15 MILLIGRAM(S): 30 INJECTION, SOLUTION INTRAMUSCULAR; INTRAVENOUS at 07:10

## 2025-04-23 RX ADMIN — Medication 10 MILLIGRAM(S): at 13:00

## 2025-04-23 RX ADMIN — MUPIROCIN CALCIUM 1 APPLICATION(S): 20 CREAM TOPICAL at 06:20

## 2025-04-23 RX ADMIN — METOPROLOL SUCCINATE 12.5 MILLIGRAM(S): 50 TABLET, EXTENDED RELEASE ORAL at 06:20

## 2025-04-23 RX ADMIN — CYCLOBENZAPRINE HYDROCHLORIDE 5 MILLIGRAM(S): 15 CAPSULE, EXTENDED RELEASE ORAL at 14:12

## 2025-04-23 RX ADMIN — OXYCODONE HYDROCHLORIDE 5 MILLIGRAM(S): 30 TABLET ORAL at 09:51

## 2025-04-23 RX ADMIN — OXYCODONE HYDROCHLORIDE 5 MILLIGRAM(S): 30 TABLET ORAL at 10:51

## 2025-04-23 RX ADMIN — Medication 1 TABLET(S): at 17:46

## 2025-04-23 RX ADMIN — POLYETHYLENE GLYCOL 3350 17 GRAM(S): 17 POWDER, FOR SOLUTION ORAL at 17:51

## 2025-04-23 RX ADMIN — Medication 2 MILLIGRAM(S): at 12:39

## 2025-04-23 RX ADMIN — KETOROLAC TROMETHAMINE 15 MILLIGRAM(S): 30 INJECTION, SOLUTION INTRAMUSCULAR; INTRAVENOUS at 14:12

## 2025-04-23 RX ADMIN — ROSUVASTATIN CALCIUM 10 MILLIGRAM(S): 20 TABLET, FILM COATED ORAL at 22:18

## 2025-04-23 RX ADMIN — KETOROLAC TROMETHAMINE 15 MILLIGRAM(S): 30 INJECTION, SOLUTION INTRAMUSCULAR; INTRAVENOUS at 22:18

## 2025-04-23 RX ADMIN — Medication 1 APPLICATION(S): at 06:20

## 2025-04-23 RX ADMIN — Medication 1 MILLIGRAM(S): at 12:07

## 2025-04-23 RX ADMIN — CYCLOBENZAPRINE HYDROCHLORIDE 5 MILLIGRAM(S): 15 CAPSULE, EXTENDED RELEASE ORAL at 22:18

## 2025-04-23 RX ADMIN — Medication 2 MILLIGRAM(S): at 18:08

## 2025-04-23 RX ADMIN — DEXTROMETHORPHAN HBR, GUAIFENESIN 1200 MILLIGRAM(S): 200 LIQUID ORAL at 06:18

## 2025-04-23 RX ADMIN — CEFTRIAXONE 100 MILLIGRAM(S): 500 INJECTION, POWDER, FOR SOLUTION INTRAMUSCULAR; INTRAVENOUS at 14:12

## 2025-04-23 RX ADMIN — Medication 1 MILLIGRAM(S): at 12:22

## 2025-04-23 RX ADMIN — Medication 81 MILLIGRAM(S): at 14:12

## 2025-04-23 RX ADMIN — OXYCODONE HYDROCHLORIDE 5 MILLIGRAM(S): 30 TABLET ORAL at 02:04

## 2025-04-23 RX ADMIN — Medication 2 TABLET(S): at 22:18

## 2025-04-23 RX ADMIN — OXYCODONE HYDROCHLORIDE 5 MILLIGRAM(S): 30 TABLET ORAL at 02:34

## 2025-04-23 NOTE — PROGRESS NOTE ADULT - PROBLEM SELECTOR PLAN 3
Meeting sepsis criteria with leukocytosis, hypotension, lactate, borderline bandemia  s/p 2.5L IVF in ED  s/p CTX and azithromycin in ED  bacteremic with strep pneumo pending sensitivities    PLAN:  - antibiotics as above  - Repeat Bcx sent 4/21, NGTD

## 2025-04-23 NOTE — PROGRESS NOTE ADULT - PROBLEM SELECTOR PLAN 4
Patient with history of COPD, nonsmoker, but worked as a  for many years  Does not require home O2, has an albuterol inhaler that he never uses  No wheezing on exam   RESOLVED    PLAN:   - DC prednisone  - xopenex Q6H PRN for wheezing   - TTE --> grossly unremarkable Patient with history of COPD, nonsmoker, but worked as a  for many years  Does not require home O2, has an albuterol inhaler that he never uses  No wheezing on exam   RESOLVED    PLAN:   - xopenex Q6H PRN for wheezing   - TTE --> grossly unremarkable
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
## 2025-04-23 NOTE — PROGRESS NOTE ADULT - TIME BILLING
Review of laboratory data, radiology results, consultants' recommendations, documentation in [place], discussion with patient/advanced care providers and interdisciplinary staff (such as , social workers, etc). Interventions were performed as documented above.

## 2025-04-23 NOTE — PROGRESS NOTE ADULT - ASSESSMENT
This is a 78 y/o M w/ PMHx of HTN, HLD, COPD, pAF w/ loop recorder admitted to [facility] on 4/19/25 for chills, CP, SOB, found to be hypotensive, hypoxic, WBC 32.   CTA Chest w/o PE, had RLL consolidation, c/f PNA, started on Ceftriaxone/Azithromycin.   BCx w/ Strep pneumoniae.    #Strep pneumoniae bacteremia 2/2 RLL PNA   #Septic shock  #Hypoxic respiratory failure   #KEVIN, improving    Overall, 78 y/o M w/ PMhx of HTN, HLD, COPD, pAF w/ loop recorder admitted to [facility] on 4/19/25 for Strep pneumoniae bacteremia 2/2 RLL PNA. Leukocytosis resolving, however pt still w/ R sided pain, not improving     Recommendations:   1. Ceftriaxone 2 g q24, will plan for 14 day course ending 5/4/25, will plan on finishing course with Levofloxacin.  2, Unclear why patient continues to have severe pain, afebrile, BCx, WBC resolving.   3. F/u BCx sent 4/21, NGTD  4. TTE w/o IE   5. Will need repeat CT Chest in 2-3 months    Thank you for consulting us and involving us in the management of this patient's case. In addition to reviewing history, imaging, documents, labs, microbiology, and infection control strategies and potential issues.     ID will continue to follow    Bernard Ayala M.D.  Attending Physician  Division of Infectious Diseases  Department of Medicine    Please contact through MS Teams message.  Office: [phone] (after 5 PM or weekend) .

## 2025-04-23 NOTE — PROGRESS NOTE ADULT - PROBLEM SELECTOR PLAN 10
Patient with one episode of paroxysmal afib in the setting of taking albuterol inhaler frequently over the course of one day.  Per patient, no further episodes of afib recorded with ILR  Not on AC    PLAN:  - Restart home metoprolol for hx of afib

## 2025-04-23 NOTE — PROGRESS NOTE ADULT - PROBLEM SELECTOR PLAN 7
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    PLAN  - Follow up outpatient

## 2025-04-23 NOTE — PROGRESS NOTE ADULT - PROBLEM SELECTOR PLAN 1
CTA PE notable for RLL pneumonia and left linear atelectasis  s/p CTX and azithromycin in the ED  full RVP negative  CXR clear  bcx strep pneumo pansensitive     PLAN:  - urine strep, legionella -> negative   - Nasal swab positive for staph aureus   - cw ctx 2g q24 per ID, plan for total 14 days of abx, dc abx recs per ID  - pain regimen: PO tylenol q6 --> oxy 2.5 q4 prn for moderate pain --> oxy 5 q4 prn for severe pain   - zofran 4mg PO prn for nausea  - speech eval given RLL PNA, concern for possible aspiration CTA PE notable for RLL pneumonia and left linear atelectasis  s/p CTX and azithromycin in the ED  full RVP negative  CXR clear  bcx strep pneumo pansensitive     PLAN:  - urine strep, legionella -> negative   - Nasal swab positive for staph aureus   - cw ctx 2g q24 per ID, plan for total 14 days of abx, dc abx recs per ID  - pain regimen: PO tylenol q6 --> oxy 2.5 q4 prn for moderate pain --> IV morphine 2mg q4 prn for severe pain   - zofran 4mg PO prn for nausea  - speech eval, thin liquids and regular diet

## 2025-04-23 NOTE — PROGRESS NOTE ADULT - PROBLEM SELECTOR PLAN 5
Likely pre-renal in the setting of poor PO intake and hypotension  s/p 2.5L IVF  Baseline creatinine ~1.0  Urine studies indicate prerenal KEVIN    Plan:   - Encourage PO intake  - Avoid nephrotoxic medications  - Monitor I/O  - RESOLVED

## 2025-04-23 NOTE — PROGRESS NOTE ADULT - ASSESSMENT
Patient is a 78 y/o male h/o HTN, HLD, COPD, paroxysmal A-fib with loop recorder not on AC c/o chest pain, fatigue, shortness of breath for the last day. Patient meeting sepsis criteria with leukocytosis, hypotension, borderline bandemia, with a lactate, findings on CTA PE notable for RLL pneumonia. ID consulted, likely will need 2 weeks of total abx, cultures with pansensitive strep pneumo. Pending repeat imaging for severe new R lower back pain.  Patient is a 80 y/o male h/o HTN, HLD, COPD, paroxysmal A-fib with loop recorder not on AC c/o chest pain, fatigue, shortness of breath for the last day. Patient meeting sepsis criteria with leukocytosis, hypotension, borderline bandemia, with a lactate, findings on CTA PE notable for RLL pneumonia. ID consulted, likely will need 2 weeks of total abx, cultures with pansensitive strep pneumo. Repeat imaging for severe new R lower back pain negative. Pending MR lumbar spine.  Assistance with ambulation/Assistance OOB with selected safe patient handling equipment/Communicate Risk of Fall with Harm to all staff/Discuss with provider need for PT consult/Monitor gait and stability/Provide patient with walking aids - walker, cane, crutches/Reinforce activity limits and safety measures with patient and family/Tailored Fall Risk Interventions/Visual Cue: Yellow wristband and red socks/Bed in lowest position, wheels locked, appropriate side rails in place/Call bell, personal items and telephone in reach/Instruct patient to call for assistance before getting out of bed or chair/Non-slip footwear when patient is out of bed/[place] to call system/Physically safe environment - no spills, clutter or unnecessary equipment/Purposeful Proactive Rounding/Room/bathroom lighting operational, light cord in reach

## 2025-04-23 NOTE — PROGRESS NOTE ADULT - SUBJECTIVE AND OBJECTIVE BOX
***************************************************************  Cammy Moreira, PGY1  Internal Medicine   Available on [employer] TEAMS  ***************************************************************    IDALMIS MONTEZ  79y  MRN: [id number]    Patient is a 79y old  Male who presents with a chief complaint of Pneumonia (2025 13:43)      Interval/Overnight Events: no events ON.     Subjective: Pt seen and examined at bedside. Denies fever, CP, SOB, abn pain, N/V, dysuria. Tolerating diet.      MEDICATIONS  (STANDING):  aspirin enteric coated 81 milliGRAM(s) Oral daily  cefTRIAXone   IVPB 2000 milliGRAM(s) IV Intermittent every 24 hours  chlorhexidine 2% Cloths 1 Application(s) Topical <User Schedule>  cyclobenzaprine 5 milliGRAM(s) Oral three times a day  guaiFENesin ER 1200 milliGRAM(s) Oral every 12 hours  ketorolac   Injectable 15 milliGRAM(s) IV Push every 8 hours  lidocaine   4% Patch 1 Patch Transdermal daily  metoprolol succinate ER 12.5 milliGRAM(s) Oral daily  multivitamin 1 Tablet(s) Oral daily  mupirocin 2% Nasal 1 Application(s) Both Nostrils two times a day  rosuvastatin 10 milliGRAM(s) Oral at bedtime    MEDICATIONS  (PRN):  acetaminophen     Tablet .. 650 milliGRAM(s) Oral every 6 hours PRN Temp greater or equal to 38C (100.4F), Mild Pain (1 - 3)  benzonatate 100 milliGRAM(s) Oral three times a day PRN Cough  levalbuterol 45 MICROgram(s) HFA Inhaler 1 Puff(s) Inhalation every 6 hours PRN wheezing  ondansetron    Tablet 4 milliGRAM(s) Oral every 6 hours PRN Nausea and/or Vomiting  oxyCODONE    IR 5 milliGRAM(s) Oral every 4 hours PRN Severe Pain (7 - 10)  oxyCODONE    IR 2.5 milliGRAM(s) Oral every 4 hours PRN Moderate Pain (4 - 6)      Objective:    Vitals: Vital Signs Last 24 Hrs  T(C): 37.1 (25 @ 05:22), Max: 37.1 (25 @ 05:22)  T(F): 98.8 (25 @ 05:22), Max: 98.8 (25 @ 05:22)  HR: 82 (25 @ 05:22) (82 - 102)  BP: 111/75 (25 @ 05:22) (111/75 - 117/77)  BP(mean): --  RR: 17 (25 @ 05:22) (17 - 18)  SpO2: 100% (25 @ 05:22) (97% - 100%)                I&O's Summary    2025 07:01  -  2025 07:00  --------------------------------------------------------  IN: 0 mL / OUT: 850 mL / NET: -850 mL        PHYSICAL EXAM:  GENERAL: NAD  HEAD:  Atraumatic, Normocephalic  EYES: EOMI, conjunctiva and sclera clear  CHEST/LUNG: Clear to auscultation bilaterally; No rales, rhonchi, wheezing, or rubs  HEART: Regular rate and rhythm; No murmurs, rubs, or gallops  ABDOMEN: Soft, Nontender, Nondistended;   SKIN: No rashes or lesions  NERVOUS SYSTEM:  Alert & Oriented X3, no focal deficits    LABS:                        13.6   22.62 )-----------( 250      ( 2025 06:12 )             41.3                         14.0   21.05 )-----------( 175      ( 2025 05:50 )             42.5     04-    135  |  99  |  29[H]  ----------------------------<  78  4.5   |  22  |  0.97      137  |  100  |  34[H]  ----------------------------<  102[H]  4.6   |  23  |  1.03    Ca    9.2      2025 06:12  Ca    9.4      2025 05:50  Phos  2.2       Mg     2.10         TPro  6.0  /  Alb  3.0[L]  /  TBili  0.9  /  DBili  x   /  AST  25  /  ALT  17  /  AlkPhos  89      CAPILLARY BLOOD GLUCOSE            Urinalysis Basic - ( 2025 12:12 )    Color: Yellow / Appearance: Clear / S.025 / pH: x  Gluc: x / Ketone: Negative mg/dL  / Bili: Negative / Urobili: 0.2 mg/dL   Blood: x / Protein: Trace mg/dL / Nitrite: Negative   Leuk Esterase: Negative / RBC: x / WBC x   Sq Epi: x / Non Sq Epi: x / Bacteria: x          RADIOLOGY & ADDITIONAL TESTS:    Imaging Personally Reviewed:  [x ] YES  [ ] NO    Consultants involved in case:   Consultant(s) Notes Reviewed:  [ x] YES  [ ] NO:   Care Discussed with Consultants/Other Providers [x ] YES  [ ] NO         ***************************************************************  Cammy Moreira, PGY1  Internal Medicine   Available on [employer] TEAMS  ***************************************************************    IDALMIS MONTEZ  79y  MRN: [id number]    Patient is a 79y old  Male who presents with a chief complaint of Pneumonia (2025 13:43)      Interval/Overnight Events: no events ON.     Subjective: Pt seen and examined at bedside. Continues to have R sided pain. This AM, pain is in front of stomach/RUQ area, 10/10. He describes it as if his "muscles are clamping." No fevers or chills.     MEDICATIONS  (STANDING):  aspirin enteric coated 81 milliGRAM(s) Oral daily  cefTRIAXone   IVPB 2000 milliGRAM(s) IV Intermittent every 24 hours  chlorhexidine 2% Cloths 1 Application(s) Topical <User Schedule>  cyclobenzaprine 5 milliGRAM(s) Oral three times a day  guaiFENesin ER 1200 milliGRAM(s) Oral every 12 hours  ketorolac   Injectable 15 milliGRAM(s) IV Push every 8 hours  lidocaine   4% Patch 1 Patch Transdermal daily  metoprolol succinate ER 12.5 milliGRAM(s) Oral daily  multivitamin 1 Tablet(s) Oral daily  mupirocin 2% Nasal 1 Application(s) Both Nostrils two times a day  rosuvastatin 10 milliGRAM(s) Oral at bedtime    MEDICATIONS  (PRN):  acetaminophen     Tablet .. 650 milliGRAM(s) Oral every 6 hours PRN Temp greater or equal to 38C (100.4F), Mild Pain (1 - 3)  benzonatate 100 milliGRAM(s) Oral three times a day PRN Cough  levalbuterol 45 MICROgram(s) HFA Inhaler 1 Puff(s) Inhalation every 6 hours PRN wheezing  ondansetron    Tablet 4 milliGRAM(s) Oral every 6 hours PRN Nausea and/or Vomiting  oxyCODONE    IR 5 milliGRAM(s) Oral every 4 hours PRN Severe Pain (7 - 10)  oxyCODONE    IR 2.5 milliGRAM(s) Oral every 4 hours PRN Moderate Pain (4 - 6)      Objective:    Vitals: Vital Signs Last 24 Hrs  T(C): 37.1 (25 @ 05:22), Max: 37.1 (25 @ 05:22)  T(F): 98.8 (25 @ 05:22), Max: 98.8 (25 @ 05:22)  HR: 82 (25 @ 05:22) (82 - 102)  BP: 111/75 (25 @ 05:22) (111/75 - 117/77)  BP(mean): --  RR: 17 (25 @ 05:22) (17 - 18)  SpO2: 100% (25 @ 05:22) (97% - 100%)                I&O's Summary    2025 07:01  -  2025 07:00  --------------------------------------------------------  IN: 0 mL / OUT: 850 mL / NET: -850 mL        PHYSICAL EXAM:  GENERAL: NAD, in pain  HEAD:  Atraumatic, Normocephalic  EYES: EOMI, conjunctiva and sclera clear  CHEST/LUNG: Decreased breath sounds in RLL, no rhonchi or wheezing   HEART: Regular rate and rhythm; No murmurs, rubs, or gallops  ABDOMEN: Tender to touch in RUQ  BACK: R lower  to light touch   SKIN: No rashes or lesions  NERVOUS SYSTEM:  Alert & Oriented X3, no focal deficits    LABS:                        13.6   22.62 )-----------( 250      ( 2025 06:12 )             41.3                         14.0   21.05 )-----------( 175      ( 2025 05:50 )             42.5         135  |  99  |  29[H]  ----------------------------<  78  4.5   |  22  |  0.97      137  |  100  |  34[H]  ----------------------------<  102[H]  4.6   |  23  |  1.03    Ca    9.2      2025 06:12  Ca    9.4      2025 05:50  Phos  2.2       Mg     2.10         TPro  6.0  /  Alb  3.0[L]  /  TBili  0.9  /  DBili  x   /  AST  25  /  ALT  17  /  AlkPhos  89      CAPILLARY BLOOD GLUCOSE            Urinalysis Basic - ( 2025 12:12 )    Color: Yellow / Appearance: Clear / S.025 / pH: x  Gluc: x / Ketone: Negative mg/dL  / Bili: Negative / Urobili: 0.2 mg/dL   Blood: x / Protein: Trace mg/dL / Nitrite: Negative   Leuk Esterase: Negative / RBC: x / WBC x   Sq Epi: x / Non Sq Epi: x / Bacteria: x          RADIOLOGY & ADDITIONAL TESTS:    Imaging Personally Reviewed:  [x ] YES  [ ] NO    Consultants involved in case:   Consultant(s) Notes Reviewed:  [ x] YES  [ ] NO:   Care Discussed with Consultants/Other Providers [x ] YES  [ ] NO

## 2025-04-23 NOTE — PROGRESS NOTE ADULT - PROBLEM SELECTOR PLAN 2
- New R flank/lower back pain developed over the weekend   - Severe, very tender to touch  - Afebrile but with continued leukocytosis despite cultures growing pansensitive strep pneumo  - Concern for stone vs obstruction vs abscess/other intraabdominal infection vs MSK pain    Plan:   - CT AP w/ contrast for infection r/o   - US kidney bladder for obstruction and r/o stones   - Repeat UA  - Schedule 5mg Flexeril TID for 2 days - New R flank/lower back pain developed over the weekend   - Severe, very tender to touch  - Afebrile but with continued leukocytosis despite cultures growing pansensitive strep pneumo  - Concern for stone vs obstruction vs abscess/other intraabdominal infection vs MSK pain    Plan:   - CT AP: interval mild improvement in PNA, pancreatic uncinate process cystic lesion, 3.7 cm, increased compared to   prior 2017  - US kidney bladder: normal   - Repeat UA normal   - Schedule 5mg Flexeril TID for 2 days  - Schedule toradal 15mg TID for 2 days   - Pending MR lumbar spine -> has loop recorder  - Trops, EKG, and LFT with AM labs

## 2025-04-23 NOTE — PROGRESS NOTE ADULT - SUBJECTIVE AND OBJECTIVE BOX
Infectious Diseases Follow Up:    Patient is a 79y old  Male who presents with a chief complaint of Pneumonia (23 Apr 2025 07:19)      Interval History/ROS:  No acute events, pt still w/ R back pain, now w/ left rib pain.   No midline back pain, no joint pain     Allergies  No Known Allergies        ANTIMICROBIALS:  cefTRIAXone   IVPB 2000 every 24 hours      Current Abx:     Previous Abx     OTHER MEDS:  MEDICATIONS  (STANDING):  acetaminophen     Tablet .. 650 every 6 hours PRN  aspirin enteric coated 81 daily  benzonatate 100 three times a day PRN  bisacodyl Suppository 10 once  cyclobenzaprine 5 three times a day  guaiFENesin ER 1200 every 12 hours  ketorolac   Injectable 15 every 8 hours  levalbuterol 45 MICROgram(s) HFA Inhaler 1 every 6 hours PRN  metoprolol succinate ER 12.5 daily  morphine  - Injectable 2 every 4 hours PRN  ondansetron    Tablet 4 every 6 hours PRN  oxyCODONE    IR 2.5 every 4 hours PRN  polyethylene glycol 3350 17 two times a day  rosuvastatin 10 at bedtime  senna 2 at bedtime      Vital Signs Last 24 Hrs  T(C): 36.9 (23 Apr 2025 12:40), Max: 37.1 (23 Apr 2025 05:22)  T(F): 98.4 (23 Apr 2025 12:40), Max: 98.8 (23 Apr 2025 05:22)  HR: 105 (23 Apr 2025 12:40) (82 - 105)  BP: 92/70 (23 Apr 2025 12:40) (92/70 - 117/77)  BP(mean): --  RR: 17 (23 Apr 2025 12:40) (17 - 18)  SpO2: 100% (23 Apr 2025 12:40) (97% - 100%)    Parameters below as of 23 Apr 2025 12:40  Patient On (Oxygen Delivery Method): room air          PHYSICAL EXAM:  GENERAL: NAD, well-developed  HEAD:  Atraumatic, Normocephalic  EYES: EOMI,, conjunctiva and sclera clear  CHEST/LUNG: Clear to auscultation bilaterally; No wheeze  HEART: Regular rate and rhythm;   ABDOMEN: Soft, Nontender, Nondistended. R sided flank/lower back pain on palpation   PSYCH: AAOx3                          14.1   11.62 )-----------( 267      ( 23 Apr 2025 06:36 )             42.2       04-23    133[L]  |  98  |  23  ----------------------------<  73  4.0   |  21[L]  |  0.97    Ca    9.1      23 Apr 2025 06:36  Phos  2.5     04-23  Mg     1.90     04-23        Urinalysis Basic - ( 23 Apr 2025 06:36 )    Color: x / Appearance: x / SG: x / pH: x  Gluc: 73 mg/dL / Ketone: x  / Bili: x / Urobili: x   Blood: x / Protein: x / Nitrite: x   Leuk Esterase: x / RBC: x / WBC x   Sq Epi: x / Non Sq Epi: x / Bacteria: x        MICROBIOLOGY:  v  Blood Blood  04-21-25   No growth at 48 Hours  --  --      Blood Blood  04-21-25   No growth at 48 Hours  --  --      Blood Blood-Venous  04-19-25   Growth in aerobic and anaerobic bottles: Streptococcus pneumoniae  Direct identification is available within approximately 3-5  hours either by Blood Panel Multiplexed PCR or Direct  MALDI-TOF. Details: [email or web address]  --  Blood Culture PCR  Streptococcus pneumoniae      Blood Blood-Peripheral  04-19-25   Growth in aerobic and anaerobic bottles: Streptococcus pneumoniae  See previous culture [id number]  --    Growth in aerobic bottle: Gram Positive Cocci in Pairs and Chains  Growth in anaerobic bottle: Gram Positive Cocci in Pairs and Chains          Rapid RVP Result: Diego (04-19 @ 07:20)        RADIOLOGY:

## 2025-04-23 NOTE — PROGRESS NOTE ADULT - PROBLEM SELECTOR PLAN 11
DVT ppx: SCD (patient refusing medical dvt ppx)  Diet: DASH  Dispo: No PT, pending R flank pain w/u DVT ppx: SCD (patient refusing medical dvt ppx)  Diet: DASH  Dispo: No PT, pending pain control

## 2025-04-23 NOTE — PROGRESS NOTE ADULT - PROBLEM SELECTOR PLAN 8
Patient takes HCTZ 12.5mg daily and metoprolol 12.5 every other day    PLAN:  - Hold home HCTZ   - Cw home metoprolol

## 2025-04-24 LAB
ALBUMIN SERPL ELPH-MCNC: 2.4 G/DL — LOW (ref 3.3–5)
ALP SERPL-CCNC: 98 U/L — SIGNIFICANT CHANGE UP (ref 40–120)
ALT FLD-CCNC: 28 U/L — SIGNIFICANT CHANGE UP (ref 4–41)
ANION GAP SERPL CALC-SCNC: 8 MMOL/L — SIGNIFICANT CHANGE UP (ref 7–14)
ANISOCYTOSIS BLD QL: SLIGHT — SIGNIFICANT CHANGE UP
AST SERPL-CCNC: 42 U/L — HIGH (ref 4–40)
BASOPHILS # BLD AUTO: 0 K/UL — SIGNIFICANT CHANGE UP (ref 0–0.2)
BASOPHILS NFR BLD AUTO: 0 % — SIGNIFICANT CHANGE UP (ref 0–2)
BILIRUB SERPL-MCNC: 1.1 MG/DL — SIGNIFICANT CHANGE UP (ref 0.2–1.2)
BUN SERPL-MCNC: 23 MG/DL — SIGNIFICANT CHANGE UP (ref 7–23)
BURR CELLS BLD QL SMEAR: PRESENT — SIGNIFICANT CHANGE UP
CALCIUM SERPL-MCNC: 8.3 MG/DL — LOW (ref 8.4–10.5)
CHLORIDE SERPL-SCNC: 99 MMOL/L — SIGNIFICANT CHANGE UP (ref 98–107)
CK SERPL-CCNC: 99 U/L — SIGNIFICANT CHANGE UP (ref 30–200)
CO2 SERPL-SCNC: 25 MMOL/L — SIGNIFICANT CHANGE UP (ref 22–31)
CREAT SERPL-MCNC: 0.88 MG/DL — SIGNIFICANT CHANGE UP (ref 0.5–1.3)
EGFR: 87 ML/MIN/1.73M2 — SIGNIFICANT CHANGE UP
EGFR: 87 ML/MIN/1.73M2 — SIGNIFICANT CHANGE UP
EOSINOPHIL # BLD AUTO: 0.09 K/UL — SIGNIFICANT CHANGE UP (ref 0–0.5)
EOSINOPHIL NFR BLD AUTO: 0.8 % — SIGNIFICANT CHANGE UP (ref 0–6)
GIANT PLATELETS BLD QL SMEAR: PRESENT — SIGNIFICANT CHANGE UP
GLUCOSE SERPL-MCNC: 90 MG/DL — SIGNIFICANT CHANGE UP (ref 70–99)
HCT VFR BLD CALC: 36.8 % — LOW (ref 39–50)
HGB BLD-MCNC: 12.7 G/DL — LOW (ref 13–17)
IANC: 7.86 K/UL — HIGH (ref 1.8–7.4)
LYMPHOCYTES # BLD AUTO: 0.4 K/UL — LOW (ref 1–3.3)
LYMPHOCYTES # BLD AUTO: 3.5 % — LOW (ref 13–44)
MACROCYTES BLD QL: SLIGHT — SIGNIFICANT CHANGE UP
MAGNESIUM SERPL-MCNC: 1.9 MG/DL — SIGNIFICANT CHANGE UP (ref 1.6–2.6)
MANUAL SMEAR VERIFICATION: SIGNIFICANT CHANGE UP
MCHC RBC-ENTMCNC: 28.9 PG — SIGNIFICANT CHANGE UP (ref 27–34)
MCHC RBC-ENTMCNC: 34.5 G/DL — SIGNIFICANT CHANGE UP (ref 32–36)
MCV RBC AUTO: 83.8 FL — SIGNIFICANT CHANGE UP (ref 80–100)
METAMYELOCYTES # FLD: 6.1 % — HIGH (ref 0–1)
METAMYELOCYTES NFR BLD: 6.1 % — HIGH (ref 0–1)
MICROCYTES BLD QL: SLIGHT — SIGNIFICANT CHANGE UP
MONOCYTES # BLD AUTO: 1.4 K/UL — HIGH (ref 0–0.9)
MONOCYTES NFR BLD AUTO: 12.2 % — SIGNIFICANT CHANGE UP (ref 2–14)
MYELOCYTES NFR BLD: 0.9 % — HIGH (ref 0–0)
NEUTROPHILS # BLD AUTO: 8.69 K/UL — HIGH (ref 1.8–7.4)
NEUTROPHILS NFR BLD AUTO: 75.6 % — SIGNIFICANT CHANGE UP (ref 43–77)
OVALOCYTES BLD QL SMEAR: SLIGHT — SIGNIFICANT CHANGE UP
PHOSPHATE SERPL-MCNC: 3.6 MG/DL — SIGNIFICANT CHANGE UP (ref 2.5–4.5)
PLAT MORPH BLD: ABNORMAL
PLATELET # BLD AUTO: 237 K/UL — SIGNIFICANT CHANGE UP (ref 150–400)
PLATELET COUNT - ESTIMATE: NORMAL — SIGNIFICANT CHANGE UP
POIKILOCYTOSIS BLD QL AUTO: SLIGHT — SIGNIFICANT CHANGE UP
POTASSIUM SERPL-MCNC: 5.2 MMOL/L — SIGNIFICANT CHANGE UP (ref 3.5–5.3)
POTASSIUM SERPL-SCNC: 5.2 MMOL/L — SIGNIFICANT CHANGE UP (ref 3.5–5.3)
PROT SERPL-MCNC: 5.3 G/DL — LOW (ref 6–8.3)
RBC # BLD: 4.39 M/UL — SIGNIFICANT CHANGE UP (ref 4.2–5.8)
RBC # FLD: 14 % — SIGNIFICANT CHANGE UP (ref 10.3–14.5)
RBC BLD AUTO: ABNORMAL
SODIUM SERPL-SCNC: 132 MMOL/L — LOW (ref 135–145)
TROPONIN T, HIGH SENSITIVITY RESULT: 35 NG/L — SIGNIFICANT CHANGE UP
VARIANT LYMPHS # BLD: 0.9 % — SIGNIFICANT CHANGE UP (ref 0–6)
VARIANT LYMPHS NFR BLD MANUAL: 0.9 % — SIGNIFICANT CHANGE UP (ref 0–6)
WBC # BLD: 11.5 K/UL — HIGH (ref 3.8–10.5)
WBC # FLD AUTO: 11.5 K/UL — HIGH (ref 3.8–10.5)

## 2025-04-24 PROCEDURE — 72158 MRI LUMBAR SPINE W/O & W/DYE: CPT | Mod: 26

## 2025-04-24 PROCEDURE — 93291 INTERROG DEV EVAL SCRMS IP: CPT | Mod: 26

## 2025-04-24 PROCEDURE — 99232 SBSQ HOSP IP/OBS MODERATE 35: CPT | Mod: GC

## 2025-04-24 PROCEDURE — 99222 1ST HOSP IP/OBS MODERATE 55: CPT

## 2025-04-24 RX ORDER — METOPROLOL SUCCINATE 50 MG/1
25 TABLET, EXTENDED RELEASE ORAL DAILY
Refills: 0 | Status: DISCONTINUED | OUTPATIENT
Start: 2025-04-24 | End: 2025-04-24

## 2025-04-24 RX ORDER — APIXABAN 2.5 MG/1
5 TABLET, FILM COATED ORAL EVERY 12 HOURS
Refills: 0 | Status: DISCONTINUED | OUTPATIENT
Start: 2025-04-24 | End: 2025-04-29

## 2025-04-24 RX ORDER — METOPROLOL SUCCINATE 50 MG/1
50 TABLET, EXTENDED RELEASE ORAL DAILY
Refills: 0 | Status: DISCONTINUED | OUTPATIENT
Start: 2025-04-24 | End: 2025-04-29

## 2025-04-24 RX ADMIN — ROSUVASTATIN CALCIUM 10 MILLIGRAM(S): 20 TABLET, FILM COATED ORAL at 21:51

## 2025-04-24 RX ADMIN — KETOROLAC TROMETHAMINE 15 MILLIGRAM(S): 30 INJECTION, SOLUTION INTRAMUSCULAR; INTRAVENOUS at 13:35

## 2025-04-24 RX ADMIN — Medication 1 APPLICATION(S): at 05:40

## 2025-04-24 RX ADMIN — Medication 650 MILLIGRAM(S): at 11:24

## 2025-04-24 RX ADMIN — KETOROLAC TROMETHAMINE 15 MILLIGRAM(S): 30 INJECTION, SOLUTION INTRAMUSCULAR; INTRAVENOUS at 05:33

## 2025-04-24 RX ADMIN — Medication 81 MILLIGRAM(S): at 11:22

## 2025-04-24 RX ADMIN — POLYETHYLENE GLYCOL 3350 17 GRAM(S): 17 POWDER, FOR SOLUTION ORAL at 05:32

## 2025-04-24 RX ADMIN — LIDOCAINE HYDROCHLORIDE 1 PATCH: 20 JELLY TOPICAL at 11:22

## 2025-04-24 RX ADMIN — DEXTROMETHORPHAN HBR, GUAIFENESIN 1200 MILLIGRAM(S): 200 LIQUID ORAL at 05:32

## 2025-04-24 RX ADMIN — Medication 2 TABLET(S): at 21:51

## 2025-04-24 RX ADMIN — Medication 1 TABLET(S): at 11:22

## 2025-04-24 RX ADMIN — MUPIROCIN CALCIUM 1 APPLICATION(S): 20 CREAM TOPICAL at 17:43

## 2025-04-24 RX ADMIN — CEFTRIAXONE 100 MILLIGRAM(S): 500 INJECTION, POWDER, FOR SOLUTION INTRAMUSCULAR; INTRAVENOUS at 13:34

## 2025-04-24 RX ADMIN — Medication 2 MILLIGRAM(S): at 02:38

## 2025-04-24 RX ADMIN — POLYETHYLENE GLYCOL 3350 17 GRAM(S): 17 POWDER, FOR SOLUTION ORAL at 17:44

## 2025-04-24 RX ADMIN — Medication 650 MILLIGRAM(S): at 12:20

## 2025-04-24 RX ADMIN — LIDOCAINE HYDROCHLORIDE 1 PATCH: 20 JELLY TOPICAL at 18:50

## 2025-04-24 RX ADMIN — Medication 2 MILLIGRAM(S): at 03:25

## 2025-04-24 RX ADMIN — Medication 650 MILLIGRAM(S): at 21:51

## 2025-04-24 RX ADMIN — Medication 650 MILLIGRAM(S): at 22:50

## 2025-04-24 RX ADMIN — DEXTROMETHORPHAN HBR, GUAIFENESIN 1200 MILLIGRAM(S): 200 LIQUID ORAL at 17:44

## 2025-04-24 RX ADMIN — METOPROLOL SUCCINATE 12.5 MILLIGRAM(S): 50 TABLET, EXTENDED RELEASE ORAL at 05:33

## 2025-04-24 RX ADMIN — CYCLOBENZAPRINE HYDROCHLORIDE 5 MILLIGRAM(S): 15 CAPSULE, EXTENDED RELEASE ORAL at 05:33

## 2025-04-24 NOTE — PROGRESS NOTE ADULT - PROBLEM SELECTOR PLAN 2
- New R flank/lower back pain developed over the weekend   - Severe, very tender to touch  - Afebrile but with continued leukocytosis despite cultures growing pansensitive strep pneumo  - Concern for stone vs obstruction vs abscess/other intraabdominal infection vs MSK pain    Plan:   - CT AP: interval mild improvement in PNA, pancreatic uncinate process cystic lesion, 3.7 cm, increased compared to   prior 2017  - US kidney bladder: normal   - Repeat UA normal   - Schedule 5mg Flexeril TID for 2 days  - Schedule toradal 15mg TID for 2 days   - Pending MR lumbar spine -> has loop recorder  - Trops, EKG, and LFT with AM labs - New R flank/lower back pain developed over the weekend   - Severe, very tender to touch  - Afebrile but with continued leukocytosis despite cultures growing pansensitive strep pneumo  - Concern for stone vs obstruction vs abscess/other intraabdominal infection vs MSK pain    Plan:   - CT AP: interval mild improvement in PNA, pancreatic uncinate process cystic lesion, 3.7 cm, increased compared to   prior 2017  - US kidney bladder: normal   - Repeat UA normal   - Schedule 5mg Flexeril TID for 2 days  - Schedule toradal 15mg TID for 2 days   - Pending MR lumbar spine

## 2025-04-24 NOTE — PROGRESS NOTE ADULT - PROBLEM SELECTOR PLAN 6
Tbili 3.8  LFTs wnl   Possibly Gilbert's or other benign process    PLAN  - downtrending Likely pre-renal in the setting of poor PO intake and hypotension  s/p 2.5L IVF  Baseline creatinine ~1.0  Urine studies indicate prerenal KEVIN  RESOLVED    Plan:   - Encourage PO intake  - Avoid nephrotoxic medications  - Monitor I/O

## 2025-04-24 NOTE — PROGRESS NOTE ADULT - ASSESSMENT
Patient is a 78 y/o male h/o HTN, HLD, COPD, paroxysmal A-fib with loop recorder not on AC c/o chest pain, fatigue, shortness of breath for the last day. Patient meeting sepsis criteria with leukocytosis, hypotension, borderline bandemia, with a lactate, findings on CTA PE notable for RLL pneumonia. ID consulted, likely will need 2 weeks of total abx, cultures with pansensitive strep pneumo. Repeat imaging for severe new R lower back pain negative. Pending MR lumbar spine.  Patient is a 80 y/o male h/o HTN, HLD, COPD, paroxysmal A-fib with loop recorder not on AC c/o chest pain, fatigue, shortness of breath for the last day. Patient meeting sepsis criteria with leukocytosis, hypotension, borderline bandemia, with a lactate, findings on CTA PE notable for RLL pneumonia. ID consulted, likely will need 2 weeks of total abx, cultures with pansensitive strep pneumo. Repeat imaging for severe new R lower back pain negative. Pending MR lumbar spine. EKG with new afib RVR Patient is a 80 y/o male h/o HTN, HLD, COPD, paroxysmal A-fib with loop recorder not on AC c/o chest pain, fatigue, shortness of breath for the last day. Patient meeting sepsis criteria with leukocytosis, hypotension, borderline bandemia, with a lactate, findings on CTA PE notable for RLL pneumonia. ID consulted, likely will need 2 weeks of total abx, cultures with pansensitive strep pneumo. Repeat imaging for severe new R lower back pain negative. Pending MR lumbar spine. EKG with new afib RVR with rates in 150s.

## 2025-04-24 NOTE — CONSULT NOTE ADULT - NS ATTEND AMEND GEN_ALL_CORE FT
79y old  Male with PMH of HTN, HLD, COPD, paroxysmal A-fib with loop recorder ( 10/2023, not on AC) presented with chills, chest pain, fatigue, shortness of breath, found to be hypotensive and admitted for pneumonia. Blood culture with streptococcus pneumoniae for which ID consulted. IV ABX started and repeat blood culture negative on 4/21. Hospital course complicated by Afib RVR VR 130s, and pt. remained asymptomatic throughout. ILR interrogation reveals Afib episode on 4/20/2025 and 4/24/2025. Of note, ILR was implanted in 10/2023 for long term monitoring of Afib and to allow for patient to remain off AC. TTE shows EF 64%, mild to mod. MR, mild AR. Recommend AC Eliquis for thromboembolic ppx if no clinical contraindication,  [id number] score 3. Continue BB metoprolol and uptitrate for rate control if BP tolerates. TTE with EF 64%.

## 2025-04-24 NOTE — PROGRESS NOTE ADULT - PROBLEM SELECTOR PLAN 10
Patient with one episode of paroxysmal afib in the setting of taking albuterol inhaler frequently over the course of one day.  Per patient, no further episodes of afib recorded with ILR  Not on AC    PLAN:  - Restart home metoprolol for hx of afib - cw rosuvastatin 10mg

## 2025-04-24 NOTE — PROGRESS NOTE ADULT - SUBJECTIVE AND OBJECTIVE BOX
***************************************************************  Cammy Moreira, PGY1  Internal Medicine   Available on [employer] TEAMS  ***************************************************************    IDALMIS MONTEZ  79y  MRN: [id number]    Patient is a 79y old  Male who presents with a chief complaint of Pneumonia (23 Apr 2025 12:54)      Interval/Overnight Events: no events ON.     Subjective: Pt seen and examined at bedside. Denies fever, CP, SOB, abn pain, N/V, dysuria. Tolerating diet.      MEDICATIONS  (STANDING):  aspirin enteric coated 81 milliGRAM(s) Oral daily  cefTRIAXone   IVPB 2000 milliGRAM(s) IV Intermittent every 24 hours  chlorhexidine 2% Cloths 1 Application(s) Topical <User Schedule>  guaiFENesin ER 1200 milliGRAM(s) Oral every 12 hours  heparin   Injectable 6000 Unit(s) IV Push once  heparin  Infusion. 800 Unit(s)/Hr (8 mL/Hr) IV Continuous <Continuous>  ketorolac   Injectable 15 milliGRAM(s) IV Push every 8 hours  lidocaine   4% Patch 1 Patch Transdermal daily  metoprolol succinate ER 12.5 milliGRAM(s) Oral daily  multivitamin 1 Tablet(s) Oral daily  mupirocin 2% Nasal 1 Application(s) Both Nostrils two times a day  polyethylene glycol 3350 17 Gram(s) Oral two times a day  rosuvastatin 10 milliGRAM(s) Oral at bedtime  senna 2 Tablet(s) Oral at bedtime    MEDICATIONS  (PRN):  acetaminophen     Tablet .. 650 milliGRAM(s) Oral every 6 hours PRN Temp greater or equal to 38C (100.4F), Mild Pain (1 - 3)  benzonatate 100 milliGRAM(s) Oral three times a day PRN Cough  heparin   Injectable 6000 Unit(s) IV Push every 6 hours PRN For aPTT less than 40  heparin   Injectable 3000 Unit(s) IV Push every 6 hours PRN For aPTT between 40 - 57  levalbuterol 45 MICROgram(s) HFA Inhaler 1 Puff(s) Inhalation every 6 hours PRN wheezing  morphine  - Injectable 2 milliGRAM(s) IV Push every 4 hours PRN Severe Pain (7 - 10)  ondansetron    Tablet 4 milliGRAM(s) Oral every 6 hours PRN Nausea and/or Vomiting  oxyCODONE    IR 2.5 milliGRAM(s) Oral every 4 hours PRN Moderate Pain (4 - 6)      Objective:    Vitals: Vital Signs Last 24 Hrs  T(C): 36.8 (04-23-25 @ 22:37), Max: 36.9 (04-23-25 @ 12:40)  T(F): 98.2 (04-23-25 @ 22:37), Max: 98.4 (04-23-25 @ 12:40)  HR: 109 (04-24-25 @ 02:50) (90 - 124)  BP: 120/75 (04-24-25 @ 02:50) (92/70 - 135/62)  BP(mean): --  RR: 18 (04-24-25 @ 02:50) (16 - 18)  SpO2: 95% (04-24-25 @ 02:50) (94% - 100%)                I&O's Summary    23 Apr 2025 07:01  -  24 Apr 2025 07:00  --------------------------------------------------------  IN: 0 mL / OUT: 400 mL / NET: -400 mL        PHYSICAL EXAM:  GENERAL: NAD  HEAD:  Atraumatic, Normocephalic  EYES: EOMI, conjunctiva and sclera clear  CHEST/LUNG: Clear to auscultation bilaterally; No rales, rhonchi, wheezing, or rubs  HEART: Regular rate and rhythm; No murmurs, rubs, or gallops  ABDOMEN: Soft, Nontender, Nondistended;   SKIN: No rashes or lesions  NERVOUS SYSTEM:  Alert & Oriented X3, no focal deficits    LABS:                        14.1   11.62 )-----------( 267      ( 23 Apr 2025 06:36 )             42.2                         13.6   22.62 )-----------( 250      ( 22 Apr 2025 06:12 )             41.3     04-23    133[L]  |  98  |  23  ----------------------------<  73  4.0   |  21[L]  |  0.97  04-22    135  |  99  |  29[H]  ----------------------------<  78  4.5   |  22  |  0.97    Ca    9.1      23 Apr 2025 06:36  Ca    9.2      22 Apr 2025 06:12  Phos  2.5     04-23  Mg     1.90     04-23    TPro  6.8  /  Alb  3.0[L]  /  TBili  0.8  /  DBili  <0.2  /  AST  36  /  ALT  28  /  AlkPhos  104  04-23    CAPILLARY BLOOD GLUCOSE            Urinalysis Basic - ( 23 Apr 2025 06:36 )    Color: x / Appearance: x / SG: x / pH: x  Gluc: 73 mg/dL / Ketone: x  / Bili: x / Urobili: x   Blood: x / Protein: x / Nitrite: x   Leuk Esterase: x / RBC: x / WBC x   Sq Epi: x / Non Sq Epi: x / Bacteria: x          RADIOLOGY & ADDITIONAL TESTS:    Imaging Personally Reviewed:  [x ] YES  [ ] NO    Consultants involved in case:   Consultant(s) Notes Reviewed:  [ x] YES  [ ] NO:   Care Discussed with Consultants/Other Providers [x ] YES  [ ] NO         ***************************************************************  Cammy Moreira, PGY1  Internal Medicine   Available on [employer] TEAMS  ***************************************************************    IDAMLIS MONTEZ  79y  MRN: [id number]    Patient is a 79y old  Male who presents with a chief complaint of Pneumonia (23 Apr 2025 12:54)      Interval/Overnight Events: Afib RVR with rates in 150s-160s.     Subjective: Patient was at MRI this AM.     MEDICATIONS  (STANDING):  aspirin enteric coated 81 milliGRAM(s) Oral daily  cefTRIAXone   IVPB 2000 milliGRAM(s) IV Intermittent every 24 hours  chlorhexidine 2% Cloths 1 Application(s) Topical <User Schedule>  guaiFENesin ER 1200 milliGRAM(s) Oral every 12 hours  heparin   Injectable 6000 Unit(s) IV Push once  heparin  Infusion. 800 Unit(s)/Hr (8 mL/Hr) IV Continuous <Continuous>  ketorolac   Injectable 15 milliGRAM(s) IV Push every 8 hours  lidocaine   4% Patch 1 Patch Transdermal daily  metoprolol succinate ER 12.5 milliGRAM(s) Oral daily  multivitamin 1 Tablet(s) Oral daily  mupirocin 2% Nasal 1 Application(s) Both Nostrils two times a day  polyethylene glycol 3350 17 Gram(s) Oral two times a day  rosuvastatin 10 milliGRAM(s) Oral at bedtime  senna 2 Tablet(s) Oral at bedtime    MEDICATIONS  (PRN):  acetaminophen     Tablet .. 650 milliGRAM(s) Oral every 6 hours PRN Temp greater or equal to 38C (100.4F), Mild Pain (1 - 3)  benzonatate 100 milliGRAM(s) Oral three times a day PRN Cough  heparin   Injectable 6000 Unit(s) IV Push every 6 hours PRN For aPTT less than 40  heparin   Injectable 3000 Unit(s) IV Push every 6 hours PRN For aPTT between 40 - 57  levalbuterol 45 MICROgram(s) HFA Inhaler 1 Puff(s) Inhalation every 6 hours PRN wheezing  morphine  - Injectable 2 milliGRAM(s) IV Push every 4 hours PRN Severe Pain (7 - 10)  ondansetron    Tablet 4 milliGRAM(s) Oral every 6 hours PRN Nausea and/or Vomiting  oxyCODONE    IR 2.5 milliGRAM(s) Oral every 4 hours PRN Moderate Pain (4 - 6)      Objective:    Vitals: Vital Signs Last 24 Hrs  T(C): 36.8 (04-23-25 @ 22:37), Max: 36.9 (04-23-25 @ 12:40)  T(F): 98.2 (04-23-25 @ 22:37), Max: 98.4 (04-23-25 @ 12:40)  HR: 109 (04-24-25 @ 02:50) (90 - 124)  BP: 120/75 (04-24-25 @ 02:50) (92/70 - 135/62)  BP(mean): --  RR: 18 (04-24-25 @ 02:50) (16 - 18)  SpO2: 95% (04-24-25 @ 02:50) (94% - 100%)                I&O's Summary    23 Apr 2025 07:01  -  24 Apr 2025 07:00  --------------------------------------------------------  IN: 0 mL / OUT: 400 mL / NET: -400 mL        PHYSICAL EXAM:  Patient was at MRI this AM.     LABS:                        14.1   11.62 )-----------( 267      ( 23 Apr 2025 06:36 )             42.2                         13.6   22.62 )-----------( 250      ( 22 Apr 2025 06:12 )             41.3     04-23    133[L]  |  98  |  23  ----------------------------<  73  4.0   |  21[L]  |  0.97  04-22    135  |  99  |  29[H]  ----------------------------<  78  4.5   |  22  |  0.97    Ca    9.1      23 Apr 2025 06:36  Ca    9.2      22 Apr 2025 06:12  Phos  2.5     04-23  Mg     1.90     04-23    TPro  6.8  /  Alb  3.0[L]  /  TBili  0.8  /  DBili  <0.2  /  AST  36  /  ALT  28  /  AlkPhos  104  04-23    CAPILLARY BLOOD GLUCOSE            Urinalysis Basic - ( 23 Apr 2025 06:36 )    Color: x / Appearance: x / SG: x / pH: x  Gluc: 73 mg/dL / Ketone: x  / Bili: x / Urobili: x   Blood: x / Protein: x / Nitrite: x   Leuk Esterase: x / RBC: x / WBC x   Sq Epi: x / Non Sq Epi: x / Bacteria: x          RADIOLOGY & ADDITIONAL TESTS:    Imaging Personally Reviewed:  [x ] YES  [ ] NO    Consultants involved in case:   Consultant(s) Notes Reviewed:  [ x] YES  [ ] NO:   Care Discussed with Consultants/Other Providers [x ] YES  [ ] NO

## 2025-04-24 NOTE — PROGRESS NOTE ADULT - PROBLEM SELECTOR PLAN 7
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    PLAN  - Follow up outpatient Tbili 3.8 --> 1.1  LFTs wnl   Possibly Gilbert's or other benign process  RESOLVED    Plan:   - Outpatient f/u

## 2025-04-24 NOTE — PROGRESS NOTE ADULT - PROBLEM SELECTOR PLAN 9
- cw rosuvastatin 10mg Patient takes HCTZ 12.5mg daily and metoprolol 12.5 every other day    PLAN:  - Hold home HCTZ   - Cw home metoprolol

## 2025-04-24 NOTE — PROGRESS NOTE ADULT - PROBLEM SELECTOR PLAN 5
Likely pre-renal in the setting of poor PO intake and hypotension  s/p 2.5L IVF  Baseline creatinine ~1.0  Urine studies indicate prerenal KEVIN    Plan:   - Encourage PO intake  - Avoid nephrotoxic medications  - Monitor I/O  - RESOLVED Patient with history of COPD, nonsmoker, but worked as a  for many years  Does not require home O2, has an albuterol inhaler that he never uses  No wheezing on exam   RESOLVED    PLAN:   - xopenex Q6H PRN for wheezing

## 2025-04-24 NOTE — PROGRESS NOTE ADULT - PROBLEM SELECTOR PLAN 1
CTA PE notable for RLL pneumonia and left linear atelectasis  s/p CTX and azithromycin in the ED  full RVP negative  CXR clear  bcx strep pneumo pansensitive     PLAN:  - urine strep, legionella -> negative   - Nasal swab positive for staph aureus   - cw ctx 2g q24 per ID, plan for total 14 days of abx, dc abx recs per ID  - pain regimen: PO tylenol q6 --> oxy 2.5 q4 prn for moderate pain --> IV morphine 2mg q4 prn for severe pain   - zofran 4mg PO prn for nausea  - speech eval, thin liquids and regular diet
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
## 2025-04-24 NOTE — PROCEDURE NOTE - ADDITIONAL PROCEDURE DETAILS
ILR implanted on 10/11/2023 for PAF  Total AT/AF 2.2%  Episodes of AF on 4/20/2025 lasted [id number] and AF on 4/24/2025 in progress ILR implanted on 10/11/2023 for PAF  Total AT/AF 2.2%  Episodes of AF on 4/20/2025 and AF on 4/24/2025 in progress with Med VR 113bpm

## 2025-04-24 NOTE — PROGRESS NOTE ADULT - PROBLEM SELECTOR PLAN 11
DVT ppx: SCD (patient refusing medical dvt ppx)  Diet: DASH  Dispo: No PT, pending pain control DVT ppx: Heparin gtt  Diet: DASH  Dispo: No PT, pending MRI

## 2025-04-24 NOTE — PROGRESS NOTE ADULT - TIME BILLING
Review of laboratory data, radiology results, consultants' recommendations, documentation in [place], discussion with patient/advanced care providers and interdisciplinary staff (such as , social workers, etc). Interventions were performed as documented above.

## 2025-04-24 NOTE — CONSULT NOTE ADULT - ASSESSMENT
Patient is a 79y old  Male with PMH of HTN, HLD, COPD, paroxysmal A-fib with loop recorder ( 10/2023, not on AC)presented with Chills, chest pain, fatigue, shortness of breath, found to be hypotensive and admitted for Pneumonia. Blood culture with streptococcus pneumoniae for which ID consulted. IV ABX started and repeat blood culture negative on 4/21. Hospital course complicated by Afib RVR VR 130s, and pt. remains asymptmatic throughout. Initially EKG was NSR and Tele reveals Afib 100s-110s mostly currently. ILR interrogation reveals Afib episode on 4/20/2025 and 4/24/2025. Of note, ILR was implanted in 10/2023 for long term monitoring of Afib and to allow for patient to remain off AC. TTE shows EF 64%, mild to mod. MR, mild AR.     ## Strep pneumoniae bacteremia 2/2 RLL PNA  ## Afib      RECOMMENDATIONS:  - Continuous telemetric monitoring, in Afib at 100s-110s, pt. remains asymptomatic   - Monitor electrolytes and replete K to 4 and Mg to 2  - Recommend AC Eliquis for thromboembolic ppx if no clinical contraindication,  [id number] score 3  - Continue BB Metoprolol and uptitrate for rate control if BP tolerates  - TTE with EF 64%  - Appreciates ID rec. and continue ABX   - Continue care per primary team    Patient to be staffed with attending. Please await attending addendum

## 2025-04-24 NOTE — PROGRESS NOTE ADULT - PROBLEM SELECTOR PLAN 4
Patient with history of COPD, nonsmoker, but worked as a  for many years  Does not require home O2, has an albuterol inhaler that he never uses  No wheezing on exam   RESOLVED    PLAN:   - xopenex Q6H PRN for wheezing   - TTE --> grossly unremarkable Meeting sepsis criteria with leukocytosis, hypotension, lactate, borderline bandemia  s/p 2.5L IVF in ED  s/p CTX and azithromycin in ED  bacteremic with strep pneumo pending sensitivities  RESOLVED     PLAN:  - antibiotics as above  - Repeat Bcx sent 4/21, NGTD

## 2025-04-24 NOTE — PROGRESS NOTE ADULT - PROBLEM SELECTOR PLAN 8
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    PLAN  - Follow up outpatient

## 2025-04-24 NOTE — PROGRESS NOTE ADULT - PROBLEM SELECTOR PLAN 3
Meeting sepsis criteria with leukocytosis, hypotension, lactate, borderline bandemia  s/p 2.5L IVF in ED  s/p CTX and azithromycin in ED  bacteremic with strep pneumo pending sensitivities    PLAN:  - antibiotics as above  - Repeat Bcx sent 4/21, NGTD Patient with one episode of paroxysmal afib in the setting of taking albuterol inhaler frequently over the course of one day.  Per patient, no further episodes of afib recorded with ILR  Not on AC, Chadsvasc score 2  EKG on 4/23 with afib and rate in 110s  TTE grossly unremarkable     PLAN:  - Restart home metoprolol for hx of afib  - Start heparin gtt, transition to PO once closer to DC   - Tele monitoring   - Cardiology consult

## 2025-04-25 LAB
ANION GAP SERPL CALC-SCNC: 10 MMOL/L — SIGNIFICANT CHANGE UP (ref 7–14)
BUN SERPL-MCNC: 23 MG/DL — SIGNIFICANT CHANGE UP (ref 7–23)
CALCIUM SERPL-MCNC: 8.3 MG/DL — LOW (ref 8.4–10.5)
CHLORIDE SERPL-SCNC: 101 MMOL/L — SIGNIFICANT CHANGE UP (ref 98–107)
CO2 SERPL-SCNC: 26 MMOL/L — SIGNIFICANT CHANGE UP (ref 22–31)
CREAT SERPL-MCNC: 0.89 MG/DL — SIGNIFICANT CHANGE UP (ref 0.5–1.3)
EGFR: 87 ML/MIN/1.73M2 — SIGNIFICANT CHANGE UP
EGFR: 87 ML/MIN/1.73M2 — SIGNIFICANT CHANGE UP
GLUCOSE SERPL-MCNC: 104 MG/DL — HIGH (ref 70–99)
HCT VFR BLD CALC: 37.8 % — LOW (ref 39–50)
HGB BLD-MCNC: 12.6 G/DL — LOW (ref 13–17)
MAGNESIUM SERPL-MCNC: 2 MG/DL — SIGNIFICANT CHANGE UP (ref 1.6–2.6)
MCHC RBC-ENTMCNC: 28.6 PG — SIGNIFICANT CHANGE UP (ref 27–34)
MCHC RBC-ENTMCNC: 33.3 G/DL — SIGNIFICANT CHANGE UP (ref 32–36)
MCV RBC AUTO: 85.9 FL — SIGNIFICANT CHANGE UP (ref 80–100)
NRBC # BLD AUTO: 0 K/UL — SIGNIFICANT CHANGE UP (ref 0–0)
NRBC # FLD: 0 K/UL — SIGNIFICANT CHANGE UP (ref 0–0)
NRBC BLD AUTO-RTO: 0 /100 WBCS — SIGNIFICANT CHANGE UP (ref 0–0)
PHOSPHATE SERPL-MCNC: 3 MG/DL — SIGNIFICANT CHANGE UP (ref 2.5–4.5)
PLATELET # BLD AUTO: 255 K/UL — SIGNIFICANT CHANGE UP (ref 150–400)
POTASSIUM SERPL-MCNC: 3.8 MMOL/L — SIGNIFICANT CHANGE UP (ref 3.5–5.3)
POTASSIUM SERPL-SCNC: 3.8 MMOL/L — SIGNIFICANT CHANGE UP (ref 3.5–5.3)
RBC # BLD: 4.4 M/UL — SIGNIFICANT CHANGE UP (ref 4.2–5.8)
RBC # FLD: 14 % — SIGNIFICANT CHANGE UP (ref 10.3–14.5)
SODIUM SERPL-SCNC: 137 MMOL/L — SIGNIFICANT CHANGE UP (ref 135–145)
WBC # BLD: 14.24 K/UL — HIGH (ref 3.8–10.5)
WBC # FLD AUTO: 14.24 K/UL — HIGH (ref 3.8–10.5)

## 2025-04-25 PROCEDURE — 93010 ELECTROCARDIOGRAM REPORT: CPT

## 2025-04-25 PROCEDURE — G0545: CPT

## 2025-04-25 PROCEDURE — 99233 SBSQ HOSP IP/OBS HIGH 50: CPT | Mod: GC

## 2025-04-25 PROCEDURE — 99232 SBSQ HOSP IP/OBS MODERATE 35: CPT

## 2025-04-25 PROCEDURE — 99231 SBSQ HOSP IP/OBS SF/LOW 25: CPT

## 2025-04-25 RX ORDER — OXYCODONE HYDROCHLORIDE 30 MG/1
10 TABLET ORAL
Refills: 0 | Status: DISCONTINUED | OUTPATIENT
Start: 2025-04-25 | End: 2025-04-29

## 2025-04-25 RX ORDER — OXYCODONE HYDROCHLORIDE 30 MG/1
10 TABLET ORAL EVERY 4 HOURS
Refills: 0 | Status: DISCONTINUED | OUTPATIENT
Start: 2025-04-25 | End: 2025-04-25

## 2025-04-25 RX ORDER — OXYCODONE HYDROCHLORIDE 30 MG/1
5 TABLET ORAL
Refills: 0 | Status: DISCONTINUED | OUTPATIENT
Start: 2025-04-25 | End: 2025-04-29

## 2025-04-25 RX ORDER — CYCLOBENZAPRINE HYDROCHLORIDE 15 MG/1
5 CAPSULE, EXTENDED RELEASE ORAL THREE TIMES A DAY
Refills: 0 | Status: DISCONTINUED | OUTPATIENT
Start: 2025-04-25 | End: 2025-04-29

## 2025-04-25 RX ORDER — OXYCODONE HYDROCHLORIDE 30 MG/1
5 TABLET ORAL EVERY 4 HOURS
Refills: 0 | Status: DISCONTINUED | OUTPATIENT
Start: 2025-04-25 | End: 2025-04-25

## 2025-04-25 RX ORDER — KETOROLAC TROMETHAMINE 30 MG/ML
15 INJECTION, SOLUTION INTRAMUSCULAR; INTRAVENOUS ONCE
Refills: 0 | Status: DISCONTINUED | OUTPATIENT
Start: 2025-04-25 | End: 2025-04-25

## 2025-04-25 RX ADMIN — Medication 2 MILLIGRAM(S): at 10:15

## 2025-04-25 RX ADMIN — Medication 2 MILLIGRAM(S): at 01:00

## 2025-04-25 RX ADMIN — KETOROLAC TROMETHAMINE 15 MILLIGRAM(S): 30 INJECTION, SOLUTION INTRAMUSCULAR; INTRAVENOUS at 04:35

## 2025-04-25 RX ADMIN — DEXTROMETHORPHAN HBR, GUAIFENESIN 1200 MILLIGRAM(S): 200 LIQUID ORAL at 05:47

## 2025-04-25 RX ADMIN — MUPIROCIN CALCIUM 1 APPLICATION(S): 20 CREAM TOPICAL at 17:29

## 2025-04-25 RX ADMIN — CEFTRIAXONE 100 MILLIGRAM(S): 500 INJECTION, POWDER, FOR SOLUTION INTRAMUSCULAR; INTRAVENOUS at 12:03

## 2025-04-25 RX ADMIN — Medication 2 MILLIGRAM(S): at 10:06

## 2025-04-25 RX ADMIN — Medication 2 TABLET(S): at 21:22

## 2025-04-25 RX ADMIN — Medication 2 MILLIGRAM(S): at 00:00

## 2025-04-25 RX ADMIN — KETOROLAC TROMETHAMINE 15 MILLIGRAM(S): 30 INJECTION, SOLUTION INTRAMUSCULAR; INTRAVENOUS at 03:37

## 2025-04-25 RX ADMIN — LIDOCAINE HYDROCHLORIDE 1 PATCH: 20 JELLY TOPICAL at 12:09

## 2025-04-25 RX ADMIN — POLYETHYLENE GLYCOL 3350 17 GRAM(S): 17 POWDER, FOR SOLUTION ORAL at 05:47

## 2025-04-25 RX ADMIN — LEVALBUTEROL HYDROCHLORIDE 1 PUFF(S): 1.25 SOLUTION RESPIRATORY (INHALATION) at 03:39

## 2025-04-25 RX ADMIN — OXYCODONE HYDROCHLORIDE 10 MILLIGRAM(S): 30 TABLET ORAL at 15:30

## 2025-04-25 RX ADMIN — METOPROLOL SUCCINATE 50 MILLIGRAM(S): 50 TABLET, EXTENDED RELEASE ORAL at 08:58

## 2025-04-25 RX ADMIN — ROSUVASTATIN CALCIUM 10 MILLIGRAM(S): 20 TABLET, FILM COATED ORAL at 21:22

## 2025-04-25 RX ADMIN — OXYCODONE HYDROCHLORIDE 10 MILLIGRAM(S): 30 TABLET ORAL at 14:32

## 2025-04-25 RX ADMIN — Medication 81 MILLIGRAM(S): at 12:05

## 2025-04-25 RX ADMIN — Medication 1 APPLICATION(S): at 05:53

## 2025-04-25 RX ADMIN — OXYCODONE HYDROCHLORIDE 5 MILLIGRAM(S): 30 TABLET ORAL at 18:15

## 2025-04-25 RX ADMIN — MUPIROCIN CALCIUM 1 APPLICATION(S): 20 CREAM TOPICAL at 05:47

## 2025-04-25 RX ADMIN — Medication 1 TABLET(S): at 12:05

## 2025-04-25 RX ADMIN — OXYCODONE HYDROCHLORIDE 10 MILLIGRAM(S): 30 TABLET ORAL at 23:18

## 2025-04-25 RX ADMIN — OXYCODONE HYDROCHLORIDE 5 MILLIGRAM(S): 30 TABLET ORAL at 17:16

## 2025-04-25 RX ADMIN — DEXTROMETHORPHAN HBR, GUAIFENESIN 1200 MILLIGRAM(S): 200 LIQUID ORAL at 17:17

## 2025-04-25 RX ADMIN — OXYCODONE HYDROCHLORIDE 10 MILLIGRAM(S): 30 TABLET ORAL at 23:48

## 2025-04-25 RX ADMIN — LEVALBUTEROL HYDROCHLORIDE 1 PUFF(S): 1.25 SOLUTION RESPIRATORY (INHALATION) at 23:18

## 2025-04-25 NOTE — PROGRESS NOTE ADULT - PROBLEM SELECTOR PLAN 5
Patient with history of COPD, nonsmoker, but worked as a  for many years  Does not require home O2, has an albuterol inhaler that he never uses  No wheezing on exam   RESOLVED    PLAN:   - xopenex Q6H PRN for wheezing

## 2025-04-25 NOTE — PROVIDER CONTACT NOTE (MEDICATION) - ASSESSMENT
Pt a&ox4, VSS, no s/s of acute distress noted, satting well on room air, patient refusing eliquis at this time, educated on risks and benefits

## 2025-04-25 NOTE — PROGRESS NOTE ADULT - ASSESSMENT
Patient is a 79y old  Male with PMH of HTN, HLD, COPD, paroxysmal A-fib with loop recorder ( 10/2023, not on AC)presented with Chills, chest pain, fatigue, shortness of breath, found to be hypotensive and admitted for Pneumonia. Blood culture with streptococcus pneumoniae for which ID consulted. IV ABX started and repeat blood culture negative on 4/21. Hospital course complicated by Afib RVR VR 130s, and pt. remains asymptmatic throughout. Initially EKG was NSR and Tele reveals Afib 100s-110s mostly currently. ILR interrogation reveals Afib episode on 4/20/2025 and 4/24/2025. Of note, ILR was implanted in 10/2023 for long term monitoring of Afib and to allow for patient to remain off AC. TTE shows EF 64%, mild to mod. MR, mild AR.     ## Strep pneumoniae bacteremia 2/2 RLL PNA  ## Afib      RECOMMENDATIONS:  - Continuous telemetric monitoring, in Afib at 100s-110s, pt. remains asymptomatic   - Monitor electrolytes and replete K to 4 and Mg to 2  - Recommend AC Eliquis for thromboembolic ppx if no clinical contraindication,  [id number] score 3  - Continue BB Metoprolol and uptitrate for rate control if BP tolerates  - TTE with EF 64%  - Will consider Rhythm control with Cardioversion if patient remains Afib after medical optimize, SHERI is warranted prior to cardioversion to r/o LA/ELBERT thrombus     - Appreciates ID rec. and continue ABX   - Continue care per primary team

## 2025-04-25 NOTE — PROGRESS NOTE ADULT - SUBJECTIVE AND OBJECTIVE BOX
Interval history:  no acute overnight event  Tele with Afib   pt. denies palpitation, CP, dizziness      PAST MEDICAL & SURGICAL HISTORY:  HTN (hypertension)    History of COPD    Atrial fibrillation    HLD (hyperlipidemia)        MEDICATIONS  (STANDING):  apixaban 5 milliGRAM(s) Oral every 12 hours  aspirin enteric coated 81 milliGRAM(s) Oral daily  cefTRIAXone   IVPB 2000 milliGRAM(s) IV Intermittent every 24 hours  chlorhexidine 2% Cloths 1 Application(s) Topical <User Schedule>  guaiFENesin ER 1200 milliGRAM(s) Oral every 12 hours  lidocaine   4% Patch 1 Patch Transdermal daily  metoprolol succinate ER 50 milliGRAM(s) Oral daily  multivitamin 1 Tablet(s) Oral daily  mupirocin 2% Nasal 1 Application(s) Both Nostrils two times a day  polyethylene glycol 3350 17 Gram(s) Oral two times a day  rosuvastatin 10 milliGRAM(s) Oral at bedtime  senna 2 Tablet(s) Oral at bedtime    MEDICATIONS  (PRN):  acetaminophen     Tablet .. 650 milliGRAM(s) Oral every 6 hours PRN Temp greater or equal to 38C (100.4F), Mild Pain (1 - 3)  benzonatate 100 milliGRAM(s) Oral three times a day PRN Cough  levalbuterol 45 MICROgram(s) HFA Inhaler 1 Puff(s) Inhalation every 6 hours PRN wheezing  morphine  - Injectable 2 milliGRAM(s) IV Push every 4 hours PRN Severe Pain (7 - 10)  ondansetron    Tablet 4 milliGRAM(s) Oral every 6 hours PRN Nausea and/or Vomiting  oxyCODONE    IR 2.5 milliGRAM(s) Oral every 4 hours PRN Moderate Pain (4 - 6)            Vital Signs Last 24 Hrs  T(C): 36.7 (25 Apr 2025 05:45), Max: 36.8 (25 Apr 2025 03:30)  T(F): 98 (25 Apr 2025 05:45), Max: 98.2 (25 Apr 2025 03:30)  HR: 117 (25 Apr 2025 05:45) (104 - 121)  BP: 93/57 (25 Apr 2025 05:45) (93/57 - 138/82)  BP(mean): --  RR: 19 (25 Apr 2025 05:45) (18 - 19)  SpO2: 95% (25 Apr 2025 05:45) (95% - 100%)    Parameters below as of 25 Apr 2025 05:45  Patient On (Oxygen Delivery Method): room air                INTERPRETATION OF TELEMETRY: Afib at 100-110s mostly    ECG:        LABS:                        12.6   14.24 )-----------( 255      ( 25 Apr 2025 06:27 )             37.8     04-25    137  |  101  |  23  ----------------------------<  104[H]  3.8   |  26  |  0.89    Ca    8.3[L]      25 Apr 2025 06:27  Phos  3.0     04-25  Mg     2.00     04-25    TPro  5.3[L]  /  Alb  2.4[L]  /  TBili  1.1  /  DBili  x   /  AST  42[H]  /  ALT  28  /  AlkPhos  98  04-24          Urinalysis Basic - ( 25 Apr 2025 06:27 )    Color: x / Appearance: x / SG: x / pH: x  Gluc: 104 mg/dL / Ketone: x  / Bili: x / Urobili: x   Blood: x / Protein: x / Nitrite: x   Leuk Esterase: x / RBC: x / WBC x   Sq Epi: x / Non Sq Epi: x / Bacteria: x      I&O's Summary    24 Apr 2025 07:01  -  25 Apr 2025 07:00  --------------------------------------------------------  IN: 350 mL / OUT: 650 mL / NET: -300 mL      BNP  RADIOLOGY & ADDITIONAL STUDIES:      PHYSICAL EXAM:    GENERAL: In no apparent distress, well nourished, and hydrated.  HEART: Irregular rate and Irregular rhythm; No murmurs, rubs, or gallops.  PULMONARY: unlabored normal breathing  ABDOMEN: Soft, Nontender, Nondistended; Bowel sounds present  EXTREMITIES:  Peripheral Pulses present, No clubbing, cyanosis, or edema  NEUROLOGICAL: Grossly nonfocal

## 2025-04-25 NOTE — PROGRESS NOTE ADULT - SUBJECTIVE AND OBJECTIVE BOX
Infectious Diseases Follow Up:    Patient is a 79y old  Male who presents with a chief complaint of Pneumonia (25 Apr 2025 08:33)      Interval History/ROS:  Pt still with pain, was in this R flank before, now right abdomen radiating to left  Denies F/C/SOB    Allergies  No Known Allergies        ANTIMICROBIALS:  cefTRIAXone   IVPB 2000 every 24 hours      Current Abx:     Previous Abx     OTHER MEDS:  MEDICATIONS  (STANDING):  acetaminophen     Tablet .. 650 every 6 hours PRN  apixaban 5 every 12 hours  aspirin enteric coated 81 daily  benzonatate 100 three times a day PRN  cyclobenzaprine 5 three times a day PRN  guaiFENesin ER 1200 every 12 hours  levalbuterol 45 MICROgram(s) HFA Inhaler 1 every 6 hours PRN  metoprolol succinate ER 50 daily  ondansetron    Tablet 4 every 6 hours PRN  oxyCODONE    IR 2.5 every 4 hours PRN  oxyCODONE    IR 5 every 4 hours PRN  polyethylene glycol 3350 17 two times a day  rosuvastatin 10 at bedtime  senna 2 at bedtime      Vital Signs Last 24 Hrs  T(C): 36.8 (25 Apr 2025 08:50), Max: 36.8 (25 Apr 2025 03:30)  T(F): 98.2 (25 Apr 2025 08:50), Max: 98.2 (25 Apr 2025 03:30)  HR: 138 (25 Apr 2025 08:50) (104 - 138)  BP: 106/67 (25 Apr 2025 08:50) (93/57 - 138/82)  BP(mean): --  RR: 18 (25 Apr 2025 08:50) (18 - 19)  SpO2: 97% (25 Apr 2025 08:50) (95% - 100%)    Parameters below as of 25 Apr 2025 08:50  Patient On (Oxygen Delivery Method): room air        PHYSICAL EXAM:  GENERAL: NAD, well-developed  HEAD:  Atraumatic, Normocephalic  EYES: EOMI,, conjunctiva and sclera clear  CHEST/LUNG: Clear to auscultation bilaterally; No wheeze  HEART: Regular rate and rhythm;   ABDOMEN: Soft, Nondistended. R sided abdominal pain now    PSYCH: AAOx3                        12.6   14.24 )-----------( 255      ( 25 Apr 2025 06:27 )             37.8       04-25    137  |  101  |  23  ----------------------------<  104[H]  3.8   |  26  |  0.89    Ca    8.3[L]      25 Apr 2025 06:27  Phos  3.0     04-25  Mg     2.00     04-25    TPro  5.3[L]  /  Alb  2.4[L]  /  TBili  1.1  /  DBili  x   /  AST  42[H]  /  ALT  28  /  AlkPhos  98  04-24      Urinalysis Basic - ( 25 Apr 2025 06:27 )    Color: x / Appearance: x / SG: x / pH: x  Gluc: 104 mg/dL / Ketone: x  / Bili: x / Urobili: x   Blood: x / Protein: x / Nitrite: x   Leuk Esterase: x / RBC: x / WBC x   Sq Epi: x / Non Sq Epi: x / Bacteria: x        MICROBIOLOGY:  v  Blood Blood  04-21-25   No growth at 4 days  --  --      Blood Blood  04-21-25   No growth at 4 days  --  --      Blood Blood-Venous  04-19-25   Growth in aerobic and anaerobic bottles: Streptococcus pneumoniae  Direct identification is available within approximately 3-5  hours either by Blood Panel Multiplexed PCR or Direct  MALDI-TOF. Details: [email or web address]  --  Blood Culture PCR  Streptococcus pneumoniae      Blood Blood-Peripheral  04-19-25   Growth in aerobic and anaerobic bottles: Streptococcus pneumoniae  See previous culture [id number]  --    Growth in aerobic bottle: Gram Positive Cocci in Pairs and Chains  Growth in anaerobic bottle: Gram Positive Cocci in Pairs and Chains          Rapid RVP Result: [employer] (04-19 @ 07:20)        RADIOLOGY:

## 2025-04-25 NOTE — PROGRESS NOTE ADULT - SUBJECTIVE AND OBJECTIVE BOX
***************************************************************  Cammy Moreira, PGY1  Internal Medicine   Available on [employer] TEAMS  ***************************************************************    IDALMIS MONTEZ  79y  MRN: [id number]    Patient is a 79y old  Male who presents with a chief complaint of Pneumonia (24 Apr 2025 13:31)      Interval/Overnight Events: no events ON.     Subjective: Pt seen and examined at bedside. Denies fever, CP, SOB, abn pain, N/V, dysuria. Tolerating diet.      MEDICATIONS  (STANDING):  apixaban 5 milliGRAM(s) Oral every 12 hours  aspirin enteric coated 81 milliGRAM(s) Oral daily  cefTRIAXone   IVPB 2000 milliGRAM(s) IV Intermittent every 24 hours  chlorhexidine 2% Cloths 1 Application(s) Topical <User Schedule>  guaiFENesin ER 1200 milliGRAM(s) Oral every 12 hours  lidocaine   4% Patch 1 Patch Transdermal daily  metoprolol succinate ER 50 milliGRAM(s) Oral daily  multivitamin 1 Tablet(s) Oral daily  mupirocin 2% Nasal 1 Application(s) Both Nostrils two times a day  polyethylene glycol 3350 17 Gram(s) Oral two times a day  rosuvastatin 10 milliGRAM(s) Oral at bedtime  senna 2 Tablet(s) Oral at bedtime    MEDICATIONS  (PRN):  acetaminophen     Tablet .. 650 milliGRAM(s) Oral every 6 hours PRN Temp greater or equal to 38C (100.4F), Mild Pain (1 - 3)  benzonatate 100 milliGRAM(s) Oral three times a day PRN Cough  levalbuterol 45 MICROgram(s) HFA Inhaler 1 Puff(s) Inhalation every 6 hours PRN wheezing  morphine  - Injectable 2 milliGRAM(s) IV Push every 4 hours PRN Severe Pain (7 - 10)  ondansetron    Tablet 4 milliGRAM(s) Oral every 6 hours PRN Nausea and/or Vomiting  oxyCODONE    IR 2.5 milliGRAM(s) Oral every 4 hours PRN Moderate Pain (4 - 6)      Objective:    Vitals: Vital Signs Last 24 Hrs  T(C): 36.7 (04-25-25 @ 00:00), Max: 36.7 (04-24-25 @ 21:53)  T(F): 98 (04-25-25 @ 00:00), Max: 98 (04-24-25 @ 21:53)  HR: 111 (04-25-25 @ 00:15) (104 - 117)  BP: 105/62 (04-25-25 @ 00:15) (103/66 - 129/67)  BP(mean): --  RR: 18 (04-25-25 @ 00:15) (18 - 18)  SpO2: 97% (04-25-25 @ 00:15) (95% - 100%)                I&O's Summary      PHYSICAL EXAM:  GENERAL: NAD  HEAD:  Atraumatic, Normocephalic  EYES: EOMI, conjunctiva and sclera clear  CHEST/LUNG: Clear to auscultation bilaterally; No rales, rhonchi, wheezing, or rubs  HEART: Regular rate and rhythm; No murmurs, rubs, or gallops  ABDOMEN: Soft, Nontender, Nondistended;   SKIN: No rashes or lesions  NERVOUS SYSTEM:  Alert & Oriented X3, no focal deficits    LABS:                        12.6   14.24 )-----------( 255      ( 25 Apr 2025 06:27 )             37.8                         12.7   11.50 )-----------( 237      ( 24 Apr 2025 07:04 )             36.8                         14.1   11.62 )-----------( 267      ( 23 Apr 2025 06:36 )             42.2     04-24    132[L]  |  99  |  23  ----------------------------<  90  5.2   |  25  |  0.88  04-23    133[L]  |  98  |  23  ----------------------------<  73  4.0   |  21[L]  |  0.97    Ca    8.3[L]      24 Apr 2025 07:04  Ca    9.1      23 Apr 2025 06:36  Phos  3.6     04-24  Mg     1.90     04-24    TPro  5.3[L]  /  Alb  2.4[L]  /  TBili  1.1  /  DBili  x   /  AST  42[H]  /  ALT  28  /  AlkPhos  98  04-24  TPro  6.8  /  Alb  3.0[L]  /  TBili  0.8  /  DBili  <0.2  /  AST  36  /  ALT  28  /  AlkPhos  104  04-23    CAPILLARY BLOOD GLUCOSE            Urinalysis Basic - ( 24 Apr 2025 07:04 )    Color: x / Appearance: x / SG: x / pH: x  Gluc: 90 mg/dL / Ketone: x  / Bili: x / Urobili: x   Blood: x / Protein: x / Nitrite: x   Leuk Esterase: x / RBC: x / WBC x   Sq Epi: x / Non Sq Epi: x / Bacteria: x          RADIOLOGY & ADDITIONAL TESTS:    Imaging Personally Reviewed:  [x ] YES  [ ] NO    Consultants involved in case:   Consultant(s) Notes Reviewed:  [ x] YES  [ ] NO:   Care Discussed with Consultants/Other Providers [x ] YES  [ ] NO         ***************************************************************  Cammy Moreira, PGY1  Internal Medicine   Available on [employer] TEAMS  ***************************************************************    IDALMIS MONTEZ  79y  MRN: [id number]    Patient is a 79y old  Male who presents with a chief complaint of Pneumonia (24 Apr 2025 13:31)      Interval/Overnight Events: Had breakthrough pain, s/p IV 15mg toradol. Tele with Afib with rates in 120-130s    Subjective: Pt seen and examined at bedside. Continues to have sharp R sided pain that worsens with inspiration. No fevers, chills, n/v.     MEDICATIONS  (STANDING):  apixaban 5 milliGRAM(s) Oral every 12 hours  aspirin enteric coated 81 milliGRAM(s) Oral daily  cefTRIAXone   IVPB 2000 milliGRAM(s) IV Intermittent every 24 hours  chlorhexidine 2% Cloths 1 Application(s) Topical <User Schedule>  guaiFENesin ER 1200 milliGRAM(s) Oral every 12 hours  lidocaine   4% Patch 1 Patch Transdermal daily  metoprolol succinate ER 50 milliGRAM(s) Oral daily  multivitamin 1 Tablet(s) Oral daily  mupirocin 2% Nasal 1 Application(s) Both Nostrils two times a day  polyethylene glycol 3350 17 Gram(s) Oral two times a day  rosuvastatin 10 milliGRAM(s) Oral at bedtime  senna 2 Tablet(s) Oral at bedtime    MEDICATIONS  (PRN):  acetaminophen     Tablet .. 650 milliGRAM(s) Oral every 6 hours PRN Temp greater or equal to 38C (100.4F), Mild Pain (1 - 3)  benzonatate 100 milliGRAM(s) Oral three times a day PRN Cough  levalbuterol 45 MICROgram(s) HFA Inhaler 1 Puff(s) Inhalation every 6 hours PRN wheezing  morphine  - Injectable 2 milliGRAM(s) IV Push every 4 hours PRN Severe Pain (7 - 10)  ondansetron    Tablet 4 milliGRAM(s) Oral every 6 hours PRN Nausea and/or Vomiting  oxyCODONE    IR 2.5 milliGRAM(s) Oral every 4 hours PRN Moderate Pain (4 - 6)      Objective:    Vitals: Vital Signs Last 24 Hrs  T(C): 36.7 (04-25-25 @ 00:00), Max: 36.7 (04-24-25 @ 21:53)  T(F): 98 (04-25-25 @ 00:00), Max: 98 (04-24-25 @ 21:53)  HR: 111 (04-25-25 @ 00:15) (104 - 117)  BP: 105/62 (04-25-25 @ 00:15) (103/66 - 129/67)  BP(mean): --  RR: 18 (04-25-25 @ 00:15) (18 - 18)  SpO2: 97% (04-25-25 @ 00:15) (95% - 100%)                I&O's Summary      PHYSICAL EXAM:  GENERAL: NAD  HEAD:  Atraumatic, Normocephalic  EYES: EOMI, conjunctiva and sclera clear  CHEST/LUNG: Decreased breath sounds in RLL, otherwise no crackles or rhonchi   HEART: Regular rate and rhythm; No murmurs, rubs, or gallops  ABDOMEN: Soft, nondistended, tender to touch on R lateral side beneath ribs  SKIN: No rashes or lesions  NERVOUS SYSTEM:  Alert & Oriented X3, no focal deficits    LABS:                        12.6   14.24 )-----------( 255      ( 25 Apr 2025 06:27 )             37.8                         12.7   11.50 )-----------( 237      ( 24 Apr 2025 07:04 )             36.8                         14.1   11.62 )-----------( 267      ( 23 Apr 2025 06:36 )             42.2     04-24    132[L]  |  99  |  23  ----------------------------<  90  5.2   |  25  |  0.88  04-23    133[L]  |  98  |  23  ----------------------------<  73  4.0   |  21[L]  |  0.97    Ca    8.3[L]      24 Apr 2025 07:04  Ca    9.1      23 Apr 2025 06:36  Phos  3.6     04-24  Mg     1.90     04-24    TPro  5.3[L]  /  Alb  2.4[L]  /  TBili  1.1  /  DBili  x   /  AST  42[H]  /  ALT  28  /  AlkPhos  98  04-24  TPro  6.8  /  Alb  3.0[L]  /  TBili  0.8  /  DBili  <0.2  /  AST  36  /  ALT  28  /  AlkPhos  104  04-23    CAPILLARY BLOOD GLUCOSE            Urinalysis Basic - ( 24 Apr 2025 07:04 )    Color: x / Appearance: x / SG: x / pH: x  Gluc: 90 mg/dL / Ketone: x  / Bili: x / Urobili: x   Blood: x / Protein: x / Nitrite: x   Leuk Esterase: x / RBC: x / WBC x   Sq Epi: x / Non Sq Epi: x / Bacteria: x          RADIOLOGY & ADDITIONAL TESTS:    Imaging Personally Reviewed:  [x ] YES  [ ] NO    Consultants involved in case:   Consultant(s) Notes Reviewed:  [ x] YES  [ ] NO:   Care Discussed with Consultants/Other Providers [x ] YES  [ ] NO

## 2025-04-25 NOTE — PROGRESS NOTE ADULT - PROBLEM SELECTOR PLAN 11
DVT ppx: Heparin gtt  Diet: DASH  Dispo: No PT, pending MRI DVT ppx: SCDs, patient refusing AC   Diet: DASH  Dispo: Pending pain control

## 2025-04-25 NOTE — PROGRESS NOTE ADULT - PROBLEM SELECTOR PLAN 6
Likely pre-renal in the setting of poor PO intake and hypotension  s/p 2.5L IVF  Baseline creatinine ~1.0  Urine studies indicate prerenal KEVIN  RESOLVED    Plan:   - Encourage PO intake  - Avoid nephrotoxic medications  - Monitor I/O

## 2025-04-25 NOTE — PROGRESS NOTE ADULT - ASSESSMENT
This is a 80 y/o M w/ PMHx of HTN, HLD, COPD, pAF w/ loop recorder admitted to [facility] on 4/19/25 for chills, CP, SOB, found to be hypotensive, hypoxic, WBC 32.   CTA Chest w/o PE, had RLL consolidation, c/f PNA, started on Ceftriaxone/Azithromycin.   BCx w/ Strep pneumoniae.    #Strep pneumoniae bacteremia 2/2 RLL PNA   #Septic shock  #Hypoxic respiratory failure   #KEVIN, improving    Overall, 80 y/o M w/ PMhx of HTN, HLD, COPD, pAF w/ loop recorder admitted to [facility] on 4/19/25 for Strep pneumoniae bacteremia 2/2 RLL PNA. Leukocytosis resolving, however pt still w/ R sided pain, not improving     Recommendations:   1. Ceftriaxone 2 g q24, will plan for 14 day course ending 5/4/25, will plan on finishing course with Levofloxacin.  2, Unclear why patient continues to have severe pain, afebrile, BCx, WBC resolving.   3. F/u BCx sent 4/21, NGTD  4. TTE w/o IE     Thank you for consulting us and involving us in the management of this patient's case. In addition to reviewing history, imaging, documents, labs, microbiology, and infection control strategies and potential issues.     ID will continue to follow    Bernard Ayala M.D.  Attending Physician  Division of Infectious Diseases  Department of Medicine    Please contact through MS Teams message.  Office: [phone] (after 5 PM or weekend) .

## 2025-04-25 NOTE — PROGRESS NOTE ADULT - TIME BILLING
Review of laboratory data, radiology results, consultants' recommendations, documentation in [place], discussion with patient/advanced care providers and interdisciplinary staff (such as , social workers, etc). Interventions were performed as documented above.

## 2025-04-25 NOTE — PROVIDER CONTACT NOTE (MEDICATION) - BACKGROUND
Patient is a 78 y/o male h/o HTN, HLD, COPD, paroxysmal A-fib with loop recorder not on AC c/o chest pain, fatigue, shortness of breath for the last day. CTA PE notable for RLL pneumonia.
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
## 2025-04-25 NOTE — PROGRESS NOTE ADULT - PROBLEM SELECTOR PLAN 7
Tbili 3.8 --> 1.1  LFTs wnl   Possibly Gilbert's or other benign process  RESOLVED    Plan:   - Outpatient f/u

## 2025-04-25 NOTE — PROGRESS NOTE ADULT - PROBLEM SELECTOR PLAN 4
Meeting sepsis criteria with leukocytosis, hypotension, lactate, borderline bandemia  s/p 2.5L IVF in ED  s/p CTX and azithromycin in ED  bacteremic with strep pneumo pending sensitivities  RESOLVED     PLAN:  - antibiotics as above  - Repeat Bcx sent 4/21, NGTD

## 2025-04-25 NOTE — PROGRESS NOTE ADULT - PROBLEM SELECTOR PLAN 2
- New R flank/lower back pain developed over the weekend   - Severe, very tender to touch  - Afebrile but with continued leukocytosis despite cultures growing pansensitive strep pneumo  - Concern for stone vs obstruction vs abscess/other intraabdominal infection vs MSK pain    Plan:   - CT AP: interval mild improvement in PNA, pancreatic uncinate process cystic lesion, 3.7 cm, increased compared to   prior 2017  - US kidney bladder: normal   - Repeat UA normal   - Schedule 5mg Flexeril TID for 2 days  - Schedule toradal 15mg TID for 2 days   - Pending MR lumbar spine - New R flank/lower back pain developed over the weekend   - Severe, very tender to touch  - Afebrile but with continued leukocytosis despite cultures growing pansensitive strep pneumo  - Concern for stone vs obstruction vs abscess/other intraabdominal infection vs MSK pain  - Imaging findings negative thus far    Plan:   - CT AP: interval mild improvement in PNA, pancreatic uncinate process cystic lesion, 3.7 cm, increased compared to   prior 2017  - US kidney bladder: normal   - MR L spine with degenerative changes   - Repeat UA normal   - s/p 5mg Flexeril TID for 2 days  - s/p toradal 15mg TID for 2 days

## 2025-04-25 NOTE — PROGRESS NOTE ADULT - PROBLEM SELECTOR PLAN 9
Patient takes HCTZ 12.5mg daily and metoprolol 12.5 every other day    PLAN:  - Hold home HCTZ   - Cw home metoprolol Patient takes HCTZ 12.5mg daily and metoprolol 12.5 every other day    PLAN:  - Hold home HCTZ   - Metroprolol as above

## 2025-04-25 NOTE — PROGRESS NOTE ADULT - ASSESSMENT
Patient is a 80 y/o male h/o HTN, HLD, COPD, paroxysmal A-fib with loop recorder not on AC c/o chest pain, fatigue, shortness of breath for the last day. Patient meeting sepsis criteria with leukocytosis, hypotension, borderline bandemia, with a lactate, findings on CTA PE notable for RLL pneumonia. ID consulted, likely will need 2 weeks of total abx, cultures with pansensitive strep pneumo. Repeat imaging for severe new R lower back pain negative. Pending MR lumbar spine. EKG with new afib RVR with rates in 150s.  Patient is a 80 y/o male h/o HTN, HLD, COPD, paroxysmal A-fib with loop recorder not on AC c/o chest pain, fatigue, shortness of breath for the last day. Patient meeting sepsis criteria with leukocytosis, hypotension, borderline bandemia, with a lactate, findings on CTA PE notable for RLL pneumonia. ID consulted, likely will need 2 weeks of total abx, cultures with pansensitive strep pneumo. Repeat imaging for severe new R lower back pain negative. MR lumbar spine with degenerative changes. EKG with new afib RVR with rates in 130s.

## 2025-04-25 NOTE — PROGRESS NOTE ADULT - PROBLEM SELECTOR PLAN 3
Patient with one episode of paroxysmal afib in the setting of taking albuterol inhaler frequently over the course of one day.  Per patient, no further episodes of afib recorded with ILR  Not on AC, Chadsvasc score 2  EKG on 4/23 with afib and rate in 110s  TTE grossly unremarkable     PLAN:  - Restart home metoprolol for hx of afib  - Start heparin gtt, transition to PO once closer to DC   - Tele monitoring   - Cardiology consult Patient with one episode of paroxysmal afib in the setting of taking albuterol inhaler frequently over the course of one day.  Per patient, no further episodes of afib recorded with ILR  Not on AC, Chadsvasc score 2  EKG on 4/23 with afib and rate in 110s  TTE grossly unremarkable   Loop recorder interrogated, afib burden 2.2%    PLAN:  - Metoprolol 50mg qd   - Tele monitoring   - EP consult, recommend AC, patient declining AC until further discussion with EP

## 2025-04-26 LAB
ANION GAP SERPL CALC-SCNC: 12 MMOL/L — SIGNIFICANT CHANGE UP (ref 7–14)
BASOPHILS # BLD AUTO: 0.03 K/UL — SIGNIFICANT CHANGE UP (ref 0–0.2)
BASOPHILS NFR BLD AUTO: 0.2 % — SIGNIFICANT CHANGE UP (ref 0–2)
BUN SERPL-MCNC: 21 MG/DL — SIGNIFICANT CHANGE UP (ref 7–23)
CALCIUM SERPL-MCNC: 8.4 MG/DL — SIGNIFICANT CHANGE UP (ref 8.4–10.5)
CHLORIDE SERPL-SCNC: 98 MMOL/L — SIGNIFICANT CHANGE UP (ref 98–107)
CO2 SERPL-SCNC: 23 MMOL/L — SIGNIFICANT CHANGE UP (ref 22–31)
CREAT SERPL-MCNC: 0.88 MG/DL — SIGNIFICANT CHANGE UP (ref 0.5–1.3)
CULTURE RESULTS: SIGNIFICANT CHANGE UP
CULTURE RESULTS: SIGNIFICANT CHANGE UP
EGFR: 87 ML/MIN/1.73M2 — SIGNIFICANT CHANGE UP
EGFR: 87 ML/MIN/1.73M2 — SIGNIFICANT CHANGE UP
EOSINOPHIL # BLD AUTO: 0.23 K/UL — SIGNIFICANT CHANGE UP (ref 0–0.5)
EOSINOPHIL NFR BLD AUTO: 1.4 % — SIGNIFICANT CHANGE UP (ref 0–6)
GLUCOSE SERPL-MCNC: 103 MG/DL — HIGH (ref 70–99)
HCT VFR BLD CALC: 38.5 % — LOW (ref 39–50)
HGB BLD-MCNC: 12.3 G/DL — LOW (ref 13–17)
IANC: 11.8 K/UL — HIGH (ref 1.8–7.4)
IMM GRANULOCYTES NFR BLD AUTO: 14.8 % — HIGH (ref 0–0.9)
LYMPHOCYTES # BLD AUTO: 0.71 K/UL — LOW (ref 1–3.3)
LYMPHOCYTES # BLD AUTO: 4.3 % — LOW (ref 13–44)
MAGNESIUM SERPL-MCNC: 2.1 MG/DL — SIGNIFICANT CHANGE UP (ref 1.6–2.6)
MCHC RBC-ENTMCNC: 28.8 PG — SIGNIFICANT CHANGE UP (ref 27–34)
MCHC RBC-ENTMCNC: 31.9 G/DL — LOW (ref 32–36)
MCV RBC AUTO: 90.2 FL — SIGNIFICANT CHANGE UP (ref 80–100)
MONOCYTES # BLD AUTO: 1.37 K/UL — HIGH (ref 0–0.9)
MONOCYTES NFR BLD AUTO: 8.3 % — SIGNIFICANT CHANGE UP (ref 2–14)
NEUTROPHILS # BLD AUTO: 11.8 K/UL — HIGH (ref 1.8–7.4)
NEUTROPHILS NFR BLD AUTO: 71 % — SIGNIFICANT CHANGE UP (ref 43–77)
NRBC # BLD AUTO: 0 K/UL — SIGNIFICANT CHANGE UP (ref 0–0)
NRBC # FLD: 0 K/UL — SIGNIFICANT CHANGE UP (ref 0–0)
NRBC BLD AUTO-RTO: 0 /100 WBCS — SIGNIFICANT CHANGE UP (ref 0–0)
PHOSPHATE SERPL-MCNC: 2.8 MG/DL — SIGNIFICANT CHANGE UP (ref 2.5–4.5)
PLATELET # BLD AUTO: 273 K/UL — SIGNIFICANT CHANGE UP (ref 150–400)
POTASSIUM SERPL-MCNC: 4.2 MMOL/L — SIGNIFICANT CHANGE UP (ref 3.5–5.3)
POTASSIUM SERPL-SCNC: 4.2 MMOL/L — SIGNIFICANT CHANGE UP (ref 3.5–5.3)
RBC # BLD: 4.27 M/UL — SIGNIFICANT CHANGE UP (ref 4.2–5.8)
RBC # FLD: 14.6 % — HIGH (ref 10.3–14.5)
SODIUM SERPL-SCNC: 133 MMOL/L — LOW (ref 135–145)
SPECIMEN SOURCE: SIGNIFICANT CHANGE UP
SPECIMEN SOURCE: SIGNIFICANT CHANGE UP
WBC # BLD: 16.59 K/UL — HIGH (ref 3.8–10.5)
WBC # FLD AUTO: 16.59 K/UL — HIGH (ref 3.8–10.5)

## 2025-04-26 PROCEDURE — 99232 SBSQ HOSP IP/OBS MODERATE 35: CPT

## 2025-04-26 RX ADMIN — OXYCODONE HYDROCHLORIDE 10 MILLIGRAM(S): 30 TABLET ORAL at 04:09

## 2025-04-26 RX ADMIN — Medication 1 APPLICATION(S): at 05:12

## 2025-04-26 RX ADMIN — POLYETHYLENE GLYCOL 3350 17 GRAM(S): 17 POWDER, FOR SOLUTION ORAL at 18:32

## 2025-04-26 RX ADMIN — LIDOCAINE HYDROCHLORIDE 1 PATCH: 20 JELLY TOPICAL at 12:52

## 2025-04-26 RX ADMIN — OXYCODONE HYDROCHLORIDE 10 MILLIGRAM(S): 30 TABLET ORAL at 03:39

## 2025-04-26 RX ADMIN — MUPIROCIN CALCIUM 1 APPLICATION(S): 20 CREAM TOPICAL at 05:09

## 2025-04-26 RX ADMIN — OXYCODONE HYDROCHLORIDE 10 MILLIGRAM(S): 30 TABLET ORAL at 20:24

## 2025-04-26 RX ADMIN — LIDOCAINE HYDROCHLORIDE 1 PATCH: 20 JELLY TOPICAL at 20:22

## 2025-04-26 RX ADMIN — DEXTROMETHORPHAN HBR, GUAIFENESIN 1200 MILLIGRAM(S): 200 LIQUID ORAL at 18:32

## 2025-04-26 RX ADMIN — Medication 2 TABLET(S): at 20:25

## 2025-04-26 RX ADMIN — OXYCODONE HYDROCHLORIDE 10 MILLIGRAM(S): 30 TABLET ORAL at 10:07

## 2025-04-26 RX ADMIN — ROSUVASTATIN CALCIUM 10 MILLIGRAM(S): 20 TABLET, FILM COATED ORAL at 20:25

## 2025-04-26 RX ADMIN — CEFTRIAXONE 100 MILLIGRAM(S): 500 INJECTION, POWDER, FOR SOLUTION INTRAMUSCULAR; INTRAVENOUS at 12:52

## 2025-04-26 RX ADMIN — OXYCODONE HYDROCHLORIDE 10 MILLIGRAM(S): 30 TABLET ORAL at 21:20

## 2025-04-26 RX ADMIN — DEXTROMETHORPHAN HBR, GUAIFENESIN 1200 MILLIGRAM(S): 200 LIQUID ORAL at 05:08

## 2025-04-26 RX ADMIN — Medication 81 MILLIGRAM(S): at 12:53

## 2025-04-26 RX ADMIN — OXYCODONE HYDROCHLORIDE 10 MILLIGRAM(S): 30 TABLET ORAL at 09:07

## 2025-04-26 RX ADMIN — OXYCODONE HYDROCHLORIDE 10 MILLIGRAM(S): 30 TABLET ORAL at 16:07

## 2025-04-26 RX ADMIN — OXYCODONE HYDROCHLORIDE 10 MILLIGRAM(S): 30 TABLET ORAL at 17:07

## 2025-04-26 RX ADMIN — Medication 1 TABLET(S): at 12:53

## 2025-04-26 NOTE — PROGRESS NOTE ADULT - PROBLEM SELECTOR PLAN 1
CTA PE notable for RLL pneumonia and left linear atelectasis  s/p CTX and azithromycin in the ED  full RVP negative  CXR clear  bcx strep pneumo pansensitive     PLAN:  - urine strep, legionella -> negative   - Nasal swab positive for staph aureus   - cw ctx 2g q24 per ID, plan for total 14 days of abx, dc abx recs per ID  - pain regimen: PO Oxycodone 5mg and 10mg for mod/sev pain  - zofran 4mg PO prn for nausea  - speech eval, thin liquids and regular diet

## 2025-04-26 NOTE — PROGRESS NOTE ADULT - PROBLEM SELECTOR PLAN 2
- New R flank/lower back pain developed over the weekend   - Severe, very tender to touch  - Afebrile but with continued leukocytosis despite cultures growing pansensitive strep pneumo  - Concern for stone vs obstruction vs abscess/other intraabdominal infection vs MSK pain  - Imaging findings negative thus far    Plan:   - CT AP: interval mild improvement in PNA, pancreatic uncinate process cystic lesion, 3.7 cm, increased compared to   prior 2017  - US kidney bladder: normal   - MR L spine with degenerative changes   - Repeat UA normal   - s/p 5mg Flexeril TID for 2 days  - s/p toradal 15mg TID for 2 days

## 2025-04-26 NOTE — PROGRESS NOTE ADULT - SUBJECTIVE AND OBJECTIVE BOX
Patient is a 79y old  Male who presents with a chief complaint of Pneumonia (25 Apr 2025 11:00)    INTERVAL HX:  - no acute events overnight  - continuing to have R side pain, managed with current pain regimen  - declining to take Eliquis. patient will further discuss with family    Allergies:  No Known Allergies    Medications:  acetaminophen     Tablet .. 650 milliGRAM(s) Oral every 6 hours PRN  apixaban 5 milliGRAM(s) Oral every 12 hours  aspirin enteric coated 81 milliGRAM(s) Oral daily  benzonatate 100 milliGRAM(s) Oral three times a day PRN  cefTRIAXone   IVPB 2000 milliGRAM(s) IV Intermittent every 24 hours  chlorhexidine 2% Cloths 1 Application(s) Topical <User Schedule>  cyclobenzaprine 5 milliGRAM(s) Oral three times a day PRN  guaiFENesin ER 1200 milliGRAM(s) Oral every 12 hours  levalbuterol 45 MICROgram(s) HFA Inhaler 1 Puff(s) Inhalation every 6 hours PRN  lidocaine   4% Patch 1 Patch Transdermal daily  metoprolol succinate ER 50 milliGRAM(s) Oral daily  multivitamin 1 Tablet(s) Oral daily  mupirocin 2% Nasal 1 Application(s) Both Nostrils two times a day  ondansetron    Tablet 4 milliGRAM(s) Oral every 6 hours PRN  oxyCODONE    IR 5 milliGRAM(s) Oral every 3 hours PRN  oxyCODONE    IR 10 milliGRAM(s) Oral every 3 hours PRN  polyethylene glycol 3350 17 Gram(s) Oral two times a day  rosuvastatin 10 milliGRAM(s) Oral at bedtime  senna 2 Tablet(s) Oral at bedtime    Vitals:  T(C): 36.7 (04-26-25 @ 05:00), Max: 36.9 (04-25-25 @ 20:49)  HR: 86 (04-26-25 @ 05:00) (86 - 118)  BP: 94/62 (04-26-25 @ 05:00) (92/68 - 104/63)  RR: 18 (04-26-25 @ 05:00) (18 - 18)  SpO2: 95% (04-26-25 @ 05:00) (90% - 95%)  I/O's:    04-25-25 @ 07:01 - 04-26-25 @ 07:00  --------------------------------------------------------  IN: 350 mL / OUT: 450 mL / NET: -100 mL      Physical Exam:  CONSTITUTIONAL: no apparent distress.  EYES: PERRLA. EOMI. No scleral icterus.  ENT: Supple. No discharge or glossitis.   CV: RRR. Normal S1 and S2. No murmurs, rubs, or gallops. No edema.   PULM: (+) decreased breath sounds RLL. Respirations unlabored. No wheezing, rales, or rhonchi.  GI: Soft, non-tender, non-distended. No palpable masses.   MSK: No cyanosis or clubbing.  NEURO: CN grossly intact  PSYCH: A+O, appropriately communicating and responding to questions    Labs:                        12.3   16.59 )-----------( 273      ( 26 Apr 2025 05:26 )             38.5     04-26    133[L]  |  98  |  21  ----------------------------<  103[H]  4.2   |  23  |  0.88    Ca    8.4      26 Apr 2025 05:26  Phos  2.8     04-26  Mg     2.10     04-26    Radiology/Procedures: Reviewed.

## 2025-04-26 NOTE — PROGRESS NOTE ADULT - ASSESSMENT
Patient is a 78 y/o male h/o HTN, HLD, COPD, paroxysmal A-fib with loop recorder not on AC c/o chest pain, fatigue, shortness of breath for the last day. Patient meeting sepsis criteria with leukocytosis, hypotension, borderline bandemia, with a lactate, findings on CTA PE notable for RLL pneumonia. ID consulted, likely will need 2 weeks of total abx, cultures with pansensitive strep pneumo. Repeat imaging for severe new R lower back pain negative. MR lumbar spine with degenerative changes. EKG with new afib RVR with rates in 130s.

## 2025-04-26 NOTE — PROVIDER CONTACT NOTE (OTHER) - RECOMMENDATIONS
As per provider Maddy Aguero, present at bedside, no further interventions needed at this time.
patient educated by primary RN and ANM on risks and benefits of heparin gtt, provider to see patient at bedside
MD Cali Rodriguez made aware

## 2025-04-26 NOTE — PROGRESS NOTE ADULT - ATTENDING COMMENTS
79M HTN, HLD, COPD/asthm, pAF with loop recorder not on AC admit with strep pneumoniae PNA c/b strep bacteremia, KEVIN, Afib RVR.     # Sepsis/strep bacteremia and PNA due to strep pneumoniae: Continues to have pleuritic chest pain, similar to the past few days, Saturating well on On RA. C/w ceftriaxone, with plan to Transition to levaquin on d/c till 5/4. Repeat BCx NGTD. WBC trending up today from 14 k to 16k, patient afebrile, will continue to monitor for today. if spikes fever, may need to send fever workup again and broaden abx    #R sided pleuritic chest pain/flank pain-seems to be peluritic in nature, likely 2/2 PNA. CT scan/ MRI reviewed, Repeat CT negative.  Patient on PO oxy/IV morphine pain regimen. Has not required IV morphine overnight    # KEVIN-improved post IVF    # Pancreatic lesion: will need OP f/u    # Afib w/ RVR, pt. has loop recorder. HR 90's-120's. On metoprolol. Patient is refusing Eliquis despite multiple discussions about risk vs benefit. He does not want to take a pill every day. For now, he is refusing Eliquis.

## 2025-04-26 NOTE — PROVIDER CONTACT NOTE (OTHER) - ASSESSMENT
a&ox4, VSS, no s/s of acute distress noted
Patient is A&Ox4, Patient denies pain, chest pain, shortness of breath, nausea, vomiting or dizziness.
pt HR going up during exertion but pt no c/o chest pain or .  HR also going up when R flank aggravated through patient activities.

## 2025-04-26 NOTE — PROVIDER CONTACT NOTE (OTHER) - BACKGROUND
pt admit dx of pneumonia. PMHx of HLD, afib, COPD...
79 year old male admitted for difficulty breathing
patient admitted for pneumonia, pmhx of atrial fibrillation, HTN, hyperlipidemia, COPD. Transferred to telemetry floor for telemetry monitoring due to atrial fibrillation

## 2025-04-26 NOTE — PROGRESS NOTE ADULT - PROBLEM SELECTOR PLAN 9
Patient takes HCTZ 12.5mg daily and metoprolol 12.5 every other day    PLAN:  - Hold home HCTZ   - Metroprolol as above

## 2025-04-26 NOTE — PROGRESS NOTE ADULT - PROBLEM SELECTOR PLAN 3
Patient with one episode of paroxysmal afib in the setting of taking albuterol inhaler frequently over the course of one day.  Per patient, no further episodes of afib recorded with ILR  Not on AC, Chadsvasc score 2  EKG on 4/23 with afib and rate in 110s  TTE grossly unremarkable   Loop recorder interrogated, afib burden 2.2%    PLAN:  - Metoprolol 50mg qd   - Tele monitoring   - EP consult, recommend AC, patient declining AC until further discussion with EP

## 2025-04-27 LAB
ADD ON TEST-SPECIMEN IN LAB: SIGNIFICANT CHANGE UP
ANION GAP SERPL CALC-SCNC: 10 MMOL/L — SIGNIFICANT CHANGE UP (ref 7–14)
ANISOCYTOSIS BLD QL: SLIGHT — SIGNIFICANT CHANGE UP
BASOPHILS # BLD AUTO: 0 K/UL — SIGNIFICANT CHANGE UP (ref 0–0.2)
BASOPHILS NFR BLD AUTO: 0 % — SIGNIFICANT CHANGE UP (ref 0–2)
BUN SERPL-MCNC: 13 MG/DL — SIGNIFICANT CHANGE UP (ref 7–23)
BURR CELLS BLD QL SMEAR: PRESENT — SIGNIFICANT CHANGE UP
CALCIUM SERPL-MCNC: 8.6 MG/DL — SIGNIFICANT CHANGE UP (ref 8.4–10.5)
CHLORIDE SERPL-SCNC: 96 MMOL/L — LOW (ref 98–107)
CO2 SERPL-SCNC: 27 MMOL/L — SIGNIFICANT CHANGE UP (ref 22–31)
CREAT SERPL-MCNC: 0.84 MG/DL — SIGNIFICANT CHANGE UP (ref 0.5–1.3)
EGFR: 89 ML/MIN/1.73M2 — SIGNIFICANT CHANGE UP
EGFR: 89 ML/MIN/1.73M2 — SIGNIFICANT CHANGE UP
EOSINOPHIL # BLD AUTO: 0 K/UL — SIGNIFICANT CHANGE UP (ref 0–0.5)
EOSINOPHIL NFR BLD AUTO: 0 % — SIGNIFICANT CHANGE UP (ref 0–6)
GLUCOSE SERPL-MCNC: 87 MG/DL — SIGNIFICANT CHANGE UP (ref 70–99)
HCT VFR BLD CALC: 38.3 % — LOW (ref 39–50)
HGB BLD-MCNC: 12.9 G/DL — LOW (ref 13–17)
HYPOCHROMIA BLD QL: SLIGHT — SIGNIFICANT CHANGE UP
IANC: 11.05 K/UL — HIGH (ref 1.8–7.4)
LYMPHOCYTES # BLD AUTO: 0 % — LOW (ref 13–44)
LYMPHOCYTES # BLD AUTO: 0 K/UL — LOW (ref 1–3.3)
MAGNESIUM SERPL-MCNC: 2.2 MG/DL — SIGNIFICANT CHANGE UP (ref 1.6–2.6)
MANUAL SMEAR VERIFICATION: SIGNIFICANT CHANGE UP
MCHC RBC-ENTMCNC: 29.1 PG — SIGNIFICANT CHANGE UP (ref 27–34)
MCHC RBC-ENTMCNC: 33.7 G/DL — SIGNIFICANT CHANGE UP (ref 32–36)
MCV RBC AUTO: 86.5 FL — SIGNIFICANT CHANGE UP (ref 80–100)
METAMYELOCYTES # FLD: 0.9 % — SIGNIFICANT CHANGE UP (ref 0–1)
METAMYELOCYTES NFR BLD: 0.9 % — SIGNIFICANT CHANGE UP (ref 0–1)
MICROCYTES BLD QL: SLIGHT — SIGNIFICANT CHANGE UP
MONOCYTES # BLD AUTO: 1.2 K/UL — HIGH (ref 0–0.9)
MONOCYTES NFR BLD AUTO: 7.9 % — SIGNIFICANT CHANGE UP (ref 2–14)
MYELOCYTES NFR BLD: 0.9 % — HIGH (ref 0–0)
NEUTROPHILS # BLD AUTO: 13.56 K/UL — HIGH (ref 1.8–7.4)
NEUTROPHILS NFR BLD AUTO: 89.4 % — HIGH (ref 43–77)
NRBC # BLD AUTO: 0 K/UL — SIGNIFICANT CHANGE UP (ref 0–0)
NRBC # FLD: 0 K/UL — SIGNIFICANT CHANGE UP (ref 0–0)
NRBC BLD AUTO-RTO: 0 /100 WBCS — SIGNIFICANT CHANGE UP (ref 0–0)
OVALOCYTES BLD QL SMEAR: SLIGHT — SIGNIFICANT CHANGE UP
PHOSPHATE SERPL-MCNC: 3.1 MG/DL — SIGNIFICANT CHANGE UP (ref 2.5–4.5)
PLAT MORPH BLD: ABNORMAL
PLATELET # BLD AUTO: 301 K/UL — SIGNIFICANT CHANGE UP (ref 150–400)
PLATELET COUNT - ESTIMATE: NORMAL — SIGNIFICANT CHANGE UP
POLYCHROMASIA BLD QL SMEAR: SLIGHT — SIGNIFICANT CHANGE UP
POTASSIUM SERPL-MCNC: 4.5 MMOL/L — SIGNIFICANT CHANGE UP (ref 3.5–5.3)
POTASSIUM SERPL-SCNC: 4.5 MMOL/L — SIGNIFICANT CHANGE UP (ref 3.5–5.3)
RBC # BLD: 4.43 M/UL — SIGNIFICANT CHANGE UP (ref 4.2–5.8)
RBC # FLD: 14.1 % — SIGNIFICANT CHANGE UP (ref 10.3–14.5)
RBC BLD AUTO: SIGNIFICANT CHANGE UP
SODIUM SERPL-SCNC: 133 MMOL/L — LOW (ref 135–145)
VARIANT LYMPHS # BLD: 0.9 % — SIGNIFICANT CHANGE UP (ref 0–6)
VARIANT LYMPHS NFR BLD MANUAL: 0.9 % — SIGNIFICANT CHANGE UP (ref 0–6)
WBC # BLD: 15.17 K/UL — HIGH (ref 3.8–10.5)
WBC # FLD AUTO: 15.17 K/UL — HIGH (ref 3.8–10.5)

## 2025-04-27 PROCEDURE — 71045 X-RAY EXAM CHEST 1 VIEW: CPT | Mod: 26

## 2025-04-27 PROCEDURE — 99232 SBSQ HOSP IP/OBS MODERATE 35: CPT

## 2025-04-27 RX ADMIN — Medication 81 MILLIGRAM(S): at 18:40

## 2025-04-27 RX ADMIN — OXYCODONE HYDROCHLORIDE 10 MILLIGRAM(S): 30 TABLET ORAL at 22:10

## 2025-04-27 RX ADMIN — OXYCODONE HYDROCHLORIDE 10 MILLIGRAM(S): 30 TABLET ORAL at 02:20

## 2025-04-27 RX ADMIN — POLYETHYLENE GLYCOL 3350 17 GRAM(S): 17 POWDER, FOR SOLUTION ORAL at 06:07

## 2025-04-27 RX ADMIN — DEXTROMETHORPHAN HBR, GUAIFENESIN 1200 MILLIGRAM(S): 200 LIQUID ORAL at 18:40

## 2025-04-27 RX ADMIN — OXYCODONE HYDROCHLORIDE 10 MILLIGRAM(S): 30 TABLET ORAL at 21:20

## 2025-04-27 RX ADMIN — CEFTRIAXONE 100 MILLIGRAM(S): 500 INJECTION, POWDER, FOR SOLUTION INTRAMUSCULAR; INTRAVENOUS at 12:59

## 2025-04-27 RX ADMIN — OXYCODONE HYDROCHLORIDE 10 MILLIGRAM(S): 30 TABLET ORAL at 08:04

## 2025-04-27 RX ADMIN — LIDOCAINE HYDROCHLORIDE 1 PATCH: 20 JELLY TOPICAL at 00:10

## 2025-04-27 RX ADMIN — METOPROLOL SUCCINATE 50 MILLIGRAM(S): 50 TABLET, EXTENDED RELEASE ORAL at 12:19

## 2025-04-27 RX ADMIN — DEXTROMETHORPHAN HBR, GUAIFENESIN 1200 MILLIGRAM(S): 200 LIQUID ORAL at 06:06

## 2025-04-27 RX ADMIN — LIDOCAINE HYDROCHLORIDE 1 PATCH: 20 JELLY TOPICAL at 18:38

## 2025-04-27 RX ADMIN — Medication 100 MILLIGRAM(S): at 21:27

## 2025-04-27 RX ADMIN — OXYCODONE HYDROCHLORIDE 10 MILLIGRAM(S): 30 TABLET ORAL at 09:04

## 2025-04-27 RX ADMIN — OXYCODONE HYDROCHLORIDE 10 MILLIGRAM(S): 30 TABLET ORAL at 01:26

## 2025-04-27 RX ADMIN — OXYCODONE HYDROCHLORIDE 10 MILLIGRAM(S): 30 TABLET ORAL at 17:22

## 2025-04-27 RX ADMIN — OXYCODONE HYDROCHLORIDE 10 MILLIGRAM(S): 30 TABLET ORAL at 13:15

## 2025-04-27 RX ADMIN — ROSUVASTATIN CALCIUM 10 MILLIGRAM(S): 20 TABLET, FILM COATED ORAL at 21:20

## 2025-04-27 RX ADMIN — OXYCODONE HYDROCHLORIDE 10 MILLIGRAM(S): 30 TABLET ORAL at 12:19

## 2025-04-27 RX ADMIN — Medication 1 TABLET(S): at 18:40

## 2025-04-27 RX ADMIN — OXYCODONE HYDROCHLORIDE 10 MILLIGRAM(S): 30 TABLET ORAL at 16:27

## 2025-04-27 RX ADMIN — Medication 1 APPLICATION(S): at 06:09

## 2025-04-27 RX ADMIN — LIDOCAINE HYDROCHLORIDE 1 PATCH: 20 JELLY TOPICAL at 20:00

## 2025-04-27 NOTE — PROGRESS NOTE ADULT - ASSESSMENT
79y yo man with HTN, HLD, COPD, paroxysmal A-fib with loop recorder ( 10/2023, not on AC)presented with Chills, chest pain, fatigue, shortness of breath, found to be hypotensive and admitted for Pneumonia. Blood culture with streptococcus pneumoniae for which ID consulted. IV ABX started and repeat blood culture negative on 4/21. Hospital course complicated by Afib RVR VR 130s, and pt. remains asymptmatic throughout. Initially EKG was NSR and Tele reveals Afib 100s-110s mostly currently. ILR interrogation reveals Afib episode on 4/20/2025 and 4/24/2025. Of note, ILR was implanted in 10/2023 for long term monitoring of Afib and to allow for patient to remain off AC. TTE shows EF 64%, mild to mod. MR, mild AR.     ## Strep pneumoniae bacteremia 2/2 RLL PNA  ## Afib      RECOMMENDATIONS:  Continuous telemetric monitoring, in Afib at 100s-110s, pt. remains asymptomatic   -continue with apixaban 5mg BID for AC   -continue with metoprolol succinate 50mg daily  -NPO at midnight for SHERI/DCCV Monday     Please see attending attestation for final recommendations.     Myke Khan MD  Cardiology Fellow  79y yo man with HTN, HLD, COPD, paroxysmal A-fib with loop recorder ( 10/2023, not on AC)presented with Chills, chest pain, fatigue, shortness of breath, found to be hypotensive and admitted for Pneumonia. Blood culture with streptococcus pneumoniae for which ID consulted. IV ABX started and repeat blood culture negative on 4/21. Hospital course complicated by Afib RVR VR 130s, and pt. remains asymptmatic throughout. Initially EKG was NSR and Tele reveals Afib 100s-110s mostly currently. ILR interrogation reveals Afib episode on 4/20/2025 and 4/24/2025. Of note, ILR was implanted in 10/2023 for long term monitoring of Afib and to allow for patient to remain off AC. TTE shows EF 64%, mild to mod. MR, mild AR.     ## Strep pneumoniae bacteremia 2/2 RLL PNA  ## Afib      RECOMMENDATIONS:  Continuous telemetric monitoring, in Afib at 100s-110s, pt. remains asymptomatic   -continue with metoprolol succinate 50mg daily  -no plan for SHERI/DCCV given that patient is in SR   -discussed AC with patient, he would not like to be on it at this time (risk of stroke discussed)  -follow up with EP outpatient     Please see attending attestation for final recommendations.     Myke Khan MD  Cardiology Fellow

## 2025-04-27 NOTE — PROGRESS NOTE ADULT - ASSESSMENT
Patient is a 78 y/o male h/o HTN, HLD, COPD, paroxysmal A-fib with loop recorder not on AC c/o chest pain, fatigue, shortness of breath for the last day. Patient meeting sepsis criteria with leukocytosis, hypotension, borderline bandemia, with a lactate, findings on CTA PE notable for RLL pneumonia. ID consulted, likely will need 2 weeks of total abx, cultures with pansensitive strep pneumo. Repeat imaging for severe new R lower back pain negative. MR lumbar spine with degenerative changes. EKG with new afib RVR with rates in 130s.  Patient is a 80 y/o male h/o HTN, HLD, COPD, paroxysmal A-fib with loop recorder not on AC c/o chest pain, fatigue, shortness of breath for the last day. Patient meeting sepsis criteria with leukocytosis, hypotension, borderline bandemia, with a lactate, findings on CTA PE notable for RLL pneumonia. ID consulted, likely will need 2 weeks of total abx, cultures with pansensitive strep pneumo. Repeat imaging for severe new R lower back pain negative. MR lumbar spine with degenerative changes. EKG with new afib RVR with rates in 130s, now back in sinus as of 4/27 7am.

## 2025-04-27 NOTE — PROGRESS NOTE ADULT - PROBLEM SELECTOR PLAN 3
Patient with one episode of paroxysmal afib in the setting of taking albuterol inhaler frequently over the course of one day.  Per patient, no further episodes of afib recorded with ILR  Not on AC, Chadsvasc score 2  EKG on 4/23 with afib and rate in 110s  TTE grossly unremarkable   Loop recorder interrogated, afib burden 2.2%    PLAN:  - Metoprolol 50mg qd   - Tele monitoring   - EP consult, recommend AC, patient declining AC until further discussion with EP Patient with one episode of paroxysmal afib in the setting of taking albuterol inhaler frequently over the course of one day.  Per patient, no further episodes of afib recorded with ILR  Not on AC, Chadsvasc score 2  EKG on 4/23 with afib and rate in 110s, 4/27 sinus rhythm   TTE grossly unremarkable   Loop recorder interrogated, afib burden 2.2%    PLAN:  - Metoprolol 50mg qd   - Tele monitoring   - EP consult, recommend AC, patient continues to decline AC and ablation

## 2025-04-27 NOTE — PROGRESS NOTE ADULT - PROBLEM SELECTOR PLAN 1
CTA PE notable for RLL pneumonia and left linear atelectasis  s/p CTX and azithromycin in the ED  full RVP negative  CXR clear  bcx strep pneumo pansensitive     PLAN:  - urine strep, legionella -> negative   - Nasal swab positive for staph aureus   - cw ctx 2g q24 per ID, plan for total 14 days of abx, dc abx recs per ID  - pain regimen: PO Oxycodone 5mg and 10mg for mod/sev pain  - zofran 4mg PO prn for nausea  - speech eval, thin liquids and regular diet - CTA PE notable for RLL pneumonia and left linear atelectasis  - s/p CTX and azithromycin in the ED  - full RVP negative, CXR clear  - bcx strep pneumo pansensitive   - Leukocytosis initially downtrending, WBC 16 as of 4/26 with neutrophil predominance, BP lower than baseline    PLAN:  - Repeat CXR, touch base with ID   - urine strep, legionella -> negative   - Nasal swab positive for staph aureus   - cw ctx 2g q24 per ID, plan for total 14 days of abx, dc abx recs per ID  - pain regimen: PO Oxycodone 5mg and 10mg for mod/sev pain  - zofran 4mg PO prn for nausea  - speech eval, thin liquids and regular diet

## 2025-04-27 NOTE — PROGRESS NOTE ADULT - ATTENDING COMMENTS
79M HTN, HLD, COPD/asthm, pAF with loop recorder not on AC admit with strep pneumoniae PNA c/b strep bacteremia, KEVIN, Afib RVR.     # Sepsis/strep bacteremia and PNA due to strep pneumoniae: Continues to have pleuritic chest pain, similar to the past few days, Saturating well on On RA. C/w ceftriaxone, with plan to Transition to levaquin on d/c till 5/4. Repeat BCx NGTD. WBC trending from 14 k to 16k, patient afebrile, will continue to monitor for today. if spikes fever, may need to send fever workup again and broaden abx    #R sided pleuritic chest pain/flank pain-seems to be peluritic in nature, likely 2/2 PNA. CT scan/ MRI reviewed, Repeat CT negative.  Patient on PO oxy/IV morphine pain regimen. Has not required IV morphine overnight    # KEVIN-improved post IVF    # Pancreatic lesion: will need OP f/u    # Afib w/ RVR, pt. has loop recorder. HR 90's-120's. On metoprolol. Patient is refusing Eliquis despite multiple discussions about risk vs benefit. He does not want to take a pill every day. For now, he is refusing Eliquis. 79M HTN, HLD, COPD/asthm, pAF with loop recorder not on AC admit with strep pneumoniae PNA c/b strep bacteremia, KEVIN, Afib RVR.     # Sepsis/strep bacteremia and PNA due to strep pneumoniae: Continues to have pleuritic chest pain, similar to the past few days, Saturating well on On RA. C/w ceftriaxone, with plan to Transition to levaquin on d/c till 5/4. Repeat BCx NGTD. WBC trending from 14 k to 16k, patient afebrile, will continue to monitor for today. if spikes fever, may need to send fever workup again and broaden abx. BP running lower than normal this AM, afebrile, Will get repeat labs this AM (was refusing this AM). Repeat CXR today    #R sided pleuritic chest pain/flank pain-seems to be pleuritic in nature, likely 2/2 PNA. CT scan/ MRI reviewed, Repeat CT negative.  Patient on PO oxy/IV morphine pain regimen. Has not required IV morphine overnight    # KEIVN-improved post IVF    # Pancreatic lesion: will need OP f/u    # Afib w/ RVR, pt. has loop recorder. Now converted back to sinus rhythm this AM. On metoprolol. Patient is refusing Eliquis despite multiple discussions about risk vs benefit. He does not want to take a pill every day. For now, he is refusing Eliquis.

## 2025-04-27 NOTE — PROGRESS NOTE ADULT - PROBLEM SELECTOR PLAN 2
- New R flank/lower back pain developed over the weekend   - Severe, very tender to touch  - Afebrile but with continued leukocytosis despite cultures growing pansensitive strep pneumo  - Concern for stone vs obstruction vs abscess/other intraabdominal infection vs MSK pain  - Imaging findings negative thus far    Plan:   - CT AP: interval mild improvement in PNA, pancreatic uncinate process cystic lesion, 3.7 cm, increased compared to   prior 2017  - US kidney bladder: normal   - MR L spine with degenerative changes   - Repeat UA normal   - s/p 5mg Flexeril TID for 2 days  - s/p toradal 15mg TID for 2 days - New R flank/lower back pain developed over the weekend   - Severe, very tender to touch  - Afebrile but with continued leukocytosis despite cultures growing pansensitive strep pneumo  - Concern for stone vs obstruction vs abscess/other intraabdominal infection vs MSK pain  - Imaging findings negative thus far    Plan:   - CT AP: interval mild improvement in PNA, pancreatic uncinate process cystic lesion, 3.7 cm, increased compared to   prior 2017  - US kidney bladder: normal   - MR L spine with degenerative changes   - Repeat UA normal   - Flexiril 5mg TID PRN, PO Oxycodone 5mg and 10mg for mod/sev pain  - s/p 5mg Flexeril TID for 2 days  - s/p toradal 15mg TID for 2 days

## 2025-04-27 NOTE — PROGRESS NOTE ADULT - SUBJECTIVE AND OBJECTIVE BOX
Patient seen and examined at bedside.    Overnight Events:   NAEO   Started on AC    Current Meds:  acetaminophen     Tablet .. 650 milliGRAM(s) Oral every 6 hours PRN  apixaban 5 milliGRAM(s) Oral every 12 hours  aspirin enteric coated 81 milliGRAM(s) Oral daily  benzonatate 100 milliGRAM(s) Oral three times a day PRN  cefTRIAXone   IVPB 2000 milliGRAM(s) IV Intermittent every 24 hours  chlorhexidine 2% Cloths 1 Application(s) Topical <User Schedule>  cyclobenzaprine 5 milliGRAM(s) Oral three times a day PRN  guaiFENesin ER 1200 milliGRAM(s) Oral every 12 hours  levalbuterol 45 MICROgram(s) HFA Inhaler 1 Puff(s) Inhalation every 6 hours PRN  lidocaine   4% Patch 1 Patch Transdermal daily  metoprolol succinate ER 50 milliGRAM(s) Oral daily  multivitamin 1 Tablet(s) Oral daily  mupirocin 2% Nasal 1 Application(s) Both Nostrils two times a day  ondansetron    Tablet 4 milliGRAM(s) Oral every 6 hours PRN  oxyCODONE    IR 5 milliGRAM(s) Oral every 3 hours PRN  oxyCODONE    IR 10 milliGRAM(s) Oral every 3 hours PRN  polyethylene glycol 3350 17 Gram(s) Oral two times a day  rosuvastatin 10 milliGRAM(s) Oral at bedtime  senna 2 Tablet(s) Oral at bedtime      Vitals:  T(F): 97.6 (04-27), Max: 98.4 (04-26)  HR: 84 (04-27) (83 - 117)  BP: 94/60 (04-27) (93/65 - 149/79)  RR: 18 (04-27)  SpO2: 98% (04-27)  I&O's Summary    26 Apr 2025 07:01  -  27 Apr 2025 07:00  --------------------------------------------------------  IN: 920 mL / OUT: 2700 mL / NET: -1780 mL        Physical Exam:  Appearance: No acute distress; well appearing  Eyes: PERRL, EOMI, pink conjunctiva  HEENT: Normal oral mucosa  Cardiovascular: RRR, S1, S2, no murmurs, rubs, or gallops; no edema; no JVD  Respiratory: Clear to auscultation bilaterally  Gastrointestinal: soft, non-tender, non-distended with normal bowel sounds  Musculoskeletal: No clubbing; no joint deformity   Neurologic: Non-focal  Lymphatic: No lymphadenopathy  Psychiatry: AAOx3, mood & affect appropriate  Skin: No rashes, ecchymoses, or cyanosis                          12.3   16.59 )-----------( 273      ( 26 Apr 2025 05:26 )             38.5     04-26    133[L]  |  98  |  21  ----------------------------<  103[H]  4.2   |  23  |  0.88    Ca    8.4      26 Apr 2025 05:26  Phos  2.8     04-26  Mg     2.10     04-26    TTE 4/21/25  CONCLUSIONS:      1. Left ventricular cavity is normal in size. Left ventricular wall thickness is normal. Left ventricular systolic function is normal with anejection fraction of 64 % by Remy's method of disks. There are no regional wall motion abnormalities seen.   2. Normal right ventricular cavity size and normal right ventricular systolic function. Tricuspid annular plane systolic excursion (TAPSE)is 2.1 cm (normal >=1.7 cm).   3. Structurally normal mitral valve with normal leaflet excursion. There is calcification of the mitral valve annulus. There is mild to moderate mitral regurgitation.   4. The aortic valve appears trileaflet with normalsystolic excursion. There is calcification of the aortic valve leaflets. There is mild aortic regurgitation.               Patient seen and examined at bedside.    Overnight Events:   NAEO   Ordered for AC but has not started it.   Does not want to take any AC despite counseling on risk of stroke   Converted to SR this AM       Current Meds:  acetaminophen     Tablet .. 650 milliGRAM(s) Oral every 6 hours PRN  apixaban 5 milliGRAM(s) Oral every 12 hours  aspirin enteric coated 81 milliGRAM(s) Oral daily  benzonatate 100 milliGRAM(s) Oral three times a day PRN  cefTRIAXone   IVPB 2000 milliGRAM(s) IV Intermittent every 24 hours  chlorhexidine 2% Cloths 1 Application(s) Topical <User Schedule>  cyclobenzaprine 5 milliGRAM(s) Oral three times a day PRN  guaiFENesin ER 1200 milliGRAM(s) Oral every 12 hours  levalbuterol 45 MICROgram(s) HFA Inhaler 1 Puff(s) Inhalation every 6 hours PRN  lidocaine   4% Patch 1 Patch Transdermal daily  metoprolol succinate ER 50 milliGRAM(s) Oral daily  multivitamin 1 Tablet(s) Oral daily  mupirocin 2% Nasal 1 Application(s) Both Nostrils two times a day  ondansetron    Tablet 4 milliGRAM(s) Oral every 6 hours PRN  oxyCODONE    IR 5 milliGRAM(s) Oral every 3 hours PRN  oxyCODONE    IR 10 milliGRAM(s) Oral every 3 hours PRN  polyethylene glycol 3350 17 Gram(s) Oral two times a day  rosuvastatin 10 milliGRAM(s) Oral at bedtime  senna 2 Tablet(s) Oral at bedtime      Vitals:  T(F): 97.6 (04-27), Max: 98.4 (04-26)  HR: 84 (04-27) (83 - 117)  BP: 94/60 (04-27) (93/65 - 149/79)  RR: 18 (04-27)  SpO2: 98% (04-27)  I&O's Summary    26 Apr 2025 07:01  -  27 Apr 2025 07:00  --------------------------------------------------------  IN: 920 mL / OUT: 2700 mL / NET: -1780 mL        Physical Exam:  Appearance: No acute distress; well appearing  Eyes: PERRL, EOMI, pink conjunctiva  HEENT: Normal oral mucosa  Cardiovascular: RRR, S1, S2, no murmurs, rubs, or gallops; no edema; no JVD  Respiratory: Clear to auscultation bilaterally  Gastrointestinal: soft, non-tender, non-distended with normal bowel sounds  Musculoskeletal: No clubbing; no joint deformity   Neurologic: Non-focal  Lymphatic: No lymphadenopathy  Psychiatry: AAOx3, mood & affect appropriate  Skin: No rashes, ecchymoses, or cyanosis                          12.3   16.59 )-----------( 273      ( 26 Apr 2025 05:26 )             38.5     04-26    133[L]  |  98  |  21  ----------------------------<  103[H]  4.2   |  23  |  0.88    Ca    8.4      26 Apr 2025 05:26  Phos  2.8     04-26  Mg     2.10     04-26    TTE 4/21/25  CONCLUSIONS:      1. Left ventricular cavity is normal in size. Left ventricular wall thickness is normal. Left ventricular systolic function is normal with anejection fraction of 64 % by Remy's method of disks. There are no regional wall motion abnormalities seen.   2. Normal right ventricular cavity size and normal right ventricular systolic function. Tricuspid annular plane systolic excursion (TAPSE)is 2.1 cm (normal >=1.7 cm).   3. Structurally normal mitral valve with normal leaflet excursion. There is calcification of the mitral valve annulus. There is mild to moderate mitral regurgitation.   4. The aortic valve appears trileaflet with normalsystolic excursion. There is calcification of the aortic valve leaflets. There is mild aortic regurgitation.

## 2025-04-27 NOTE — PROGRESS NOTE ADULT - ATTENDING COMMENTS
79 year old man w/pAF admitted with strep pneumo bacteremia. EP consulted for AF w/RVR  - patient converted to sinus earlier today. No plan for further rhythm control at this time  - c/w Toprol-XL 50mg QD  - Patient wishes to defer full dose anticoagulation at this time. Risk of stroke discussed and accepted by patient  - outpatient f/u

## 2025-04-27 NOTE — PROGRESS NOTE ADULT - SUBJECTIVE AND OBJECTIVE BOX
***************************************************************  Cammy Moreira, PGY1  Internal Medicine   Available on [employer] TEAMS  ***************************************************************    IDALMIS MONTEZ  79y  MRN: [id number]    Patient is a 79y old  Male who presents with a chief complaint of Pneumonia (26 Apr 2025 09:58)      Interval/Overnight Events: no events ON.     Subjective: Pt seen and examined at bedside. Denies fever, CP, SOB, abn pain, N/V, dysuria. Tolerating diet.      MEDICATIONS  (STANDING):  apixaban 5 milliGRAM(s) Oral every 12 hours  aspirin enteric coated 81 milliGRAM(s) Oral daily  cefTRIAXone   IVPB 2000 milliGRAM(s) IV Intermittent every 24 hours  chlorhexidine 2% Cloths 1 Application(s) Topical <User Schedule>  guaiFENesin ER 1200 milliGRAM(s) Oral every 12 hours  lidocaine   4% Patch 1 Patch Transdermal daily  metoprolol succinate ER 50 milliGRAM(s) Oral daily  multivitamin 1 Tablet(s) Oral daily  mupirocin 2% Nasal 1 Application(s) Both Nostrils two times a day  polyethylene glycol 3350 17 Gram(s) Oral two times a day  rosuvastatin 10 milliGRAM(s) Oral at bedtime  senna 2 Tablet(s) Oral at bedtime    MEDICATIONS  (PRN):  acetaminophen     Tablet .. 650 milliGRAM(s) Oral every 6 hours PRN Temp greater or equal to 38C (100.4F), Mild Pain (1 - 3)  benzonatate 100 milliGRAM(s) Oral three times a day PRN Cough  cyclobenzaprine 5 milliGRAM(s) Oral three times a day PRN Muscle Spasm  levalbuterol 45 MICROgram(s) HFA Inhaler 1 Puff(s) Inhalation every 6 hours PRN wheezing  ondansetron    Tablet 4 milliGRAM(s) Oral every 6 hours PRN Nausea and/or Vomiting  oxyCODONE    IR 5 milliGRAM(s) Oral every 3 hours PRN Moderate Pain (4 - 6)  oxyCODONE    IR 10 milliGRAM(s) Oral every 3 hours PRN Severe Pain (7 - 10)      Objective:    Vitals: Vital Signs Last 24 Hrs  T(C): 36.4 (04-26-25 @ 21:03), Max: 36.9 (04-26-25 @ 11:14)  T(F): 97.6 (04-26-25 @ 21:03), Max: 98.4 (04-26-25 @ 11:14)  HR: 95 (04-26-25 @ 21:03) (95 - 117)  BP: 93/65 (04-26-25 @ 21:03) (93/65 - 149/79)  BP(mean): --  RR: 17 (04-26-25 @ 21:03) (17 - 18)  SpO2: 95% (04-26-25 @ 21:03) (95% - 97%)                I&O's Summary    26 Apr 2025 07:01  -  27 Apr 2025 07:00  --------------------------------------------------------  IN: 870 mL / OUT: 2150 mL / NET: -1280 mL        PHYSICAL EXAM:  GENERAL: NAD  HEAD:  Atraumatic, Normocephalic  EYES: EOMI, conjunctiva and sclera clear  CHEST/LUNG: Clear to auscultation bilaterally; No rales, rhonchi, wheezing, or rubs  HEART: Regular rate and rhythm; No murmurs, rubs, or gallops  ABDOMEN: Soft, Nontender, Nondistended;   SKIN: No rashes or lesions  NERVOUS SYSTEM:  Alert & Oriented X3, no focal deficits    LABS:                        12.3   16.59 )-----------( 273      ( 26 Apr 2025 05:26 )             38.5                         12.6   14.24 )-----------( 255      ( 25 Apr 2025 06:27 )             37.8     04-26    133[L]  |  98  |  21  ----------------------------<  103[H]  4.2   |  23  |  0.88  04-25    137  |  101  |  23  ----------------------------<  104[H]  3.8   |  26  |  0.89    Ca    8.4      26 Apr 2025 05:26  Ca    8.3[L]      25 Apr 2025 06:27  Phos  2.8     04-26  Mg     2.10     04-26      CAPILLARY BLOOD GLUCOSE            Urinalysis Basic - ( 26 Apr 2025 05:26 )    Color: x / Appearance: x / SG: x / pH: x  Gluc: 103 mg/dL / Ketone: x  / Bili: x / Urobili: x   Blood: x / Protein: x / Nitrite: x   Leuk Esterase: x / RBC: x / WBC x   Sq Epi: x / Non Sq Epi: x / Bacteria: x          RADIOLOGY & ADDITIONAL TESTS:    Imaging Personally Reviewed:  [x ] YES  [ ] NO    Consultants involved in case:   Consultant(s) Notes Reviewed:  [ x] YES  [ ] NO:   Care Discussed with Consultants/Other Providers [x ] YES  [ ] NO         ***************************************************************  Cammy Moreira, PGY1  Internal Medicine   Available on [employer] TEAMS  ***************************************************************    IDALMIS MONTEZ  79y  MRN: [id number]    Patient is a 79y old  Male who presents with a chief complaint of Pneumonia (26 Apr 2025 09:58)      Interval/Overnight Events: no events ON. Afib w/ rates in 110s overnight, as of 7AM, now back in sinus rhythm.     Subjective: Pt seen and examined at bedside. Pain unchanged from admission but controlled with current pain regimen. No fevers, no chills, no diarrhea, no dysuria, no frequency.     MEDICATIONS  (STANDING):  apixaban 5 milliGRAM(s) Oral every 12 hours  aspirin enteric coated 81 milliGRAM(s) Oral daily  cefTRIAXone   IVPB 2000 milliGRAM(s) IV Intermittent every 24 hours  chlorhexidine 2% Cloths 1 Application(s) Topical <User Schedule>  guaiFENesin ER 1200 milliGRAM(s) Oral every 12 hours  lidocaine   4% Patch 1 Patch Transdermal daily  metoprolol succinate ER 50 milliGRAM(s) Oral daily  multivitamin 1 Tablet(s) Oral daily  mupirocin 2% Nasal 1 Application(s) Both Nostrils two times a day  polyethylene glycol 3350 17 Gram(s) Oral two times a day  rosuvastatin 10 milliGRAM(s) Oral at bedtime  senna 2 Tablet(s) Oral at bedtime    MEDICATIONS  (PRN):  acetaminophen     Tablet .. 650 milliGRAM(s) Oral every 6 hours PRN Temp greater or equal to 38C (100.4F), Mild Pain (1 - 3)  benzonatate 100 milliGRAM(s) Oral three times a day PRN Cough  cyclobenzaprine 5 milliGRAM(s) Oral three times a day PRN Muscle Spasm  levalbuterol 45 MICROgram(s) HFA Inhaler 1 Puff(s) Inhalation every 6 hours PRN wheezing  ondansetron    Tablet 4 milliGRAM(s) Oral every 6 hours PRN Nausea and/or Vomiting  oxyCODONE    IR 5 milliGRAM(s) Oral every 3 hours PRN Moderate Pain (4 - 6)  oxyCODONE    IR 10 milliGRAM(s) Oral every 3 hours PRN Severe Pain (7 - 10)      Objective:    Vitals: Vital Signs Last 24 Hrs  T(C): 36.4 (04-26-25 @ 21:03), Max: 36.9 (04-26-25 @ 11:14)  T(F): 97.6 (04-26-25 @ 21:03), Max: 98.4 (04-26-25 @ 11:14)  HR: 95 (04-26-25 @ 21:03) (95 - 117)  BP: 93/65 (04-26-25 @ 21:03) (93/65 - 149/79)  BP(mean): --  RR: 17 (04-26-25 @ 21:03) (17 - 18)  SpO2: 95% (04-26-25 @ 21:03) (95% - 97%)                I&O's Summary    26 Apr 2025 07:01  -  27 Apr 2025 07:00  --------------------------------------------------------  IN: 870 mL / OUT: 2150 mL / NET: -1280 mL        PHYSICAL EXAM:  GENERAL: NAD, resting comfortably in bed   HEAD:  Atraumatic, Normocephalic  EYES: EOMI, conjunctiva and sclera clear  CHEST/LUNG: Rhonchi in RLL, otherwise clear to auscultation  HEART: Regular rate and rhythm; No murmurs, rubs, or gallops  ABDOMEN: Soft, Nontender, Nondistended;   SKIN: No rashes or lesions  NERVOUS SYSTEM:  Alert & Oriented X3, no focal deficits    LABS:                        12.3   16.59 )-----------( 273      ( 26 Apr 2025 05:26 )             38.5                         12.6   14.24 )-----------( 255      ( 25 Apr 2025 06:27 )             37.8     04-26    133[L]  |  98  |  21  ----------------------------<  103[H]  4.2   |  23  |  0.88  04-25    137  |  101  |  23  ----------------------------<  104[H]  3.8   |  26  |  0.89    Ca    8.4      26 Apr 2025 05:26  Ca    8.3[L]      25 Apr 2025 06:27  Phos  2.8     04-26  Mg     2.10     04-26      CAPILLARY BLOOD GLUCOSE            Urinalysis Basic - ( 26 Apr 2025 05:26 )    Color: x / Appearance: x / SG: x / pH: x  Gluc: 103 mg/dL / Ketone: x  / Bili: x / Urobili: x   Blood: x / Protein: x / Nitrite: x   Leuk Esterase: x / RBC: x / WBC x   Sq Epi: x / Non Sq Epi: x / Bacteria: x          RADIOLOGY & ADDITIONAL TESTS:    Imaging Personally Reviewed:  [x ] YES  [ ] NO    Consultants involved in case:   Consultant(s) Notes Reviewed:  [ x] YES  [ ] NO:   Care Discussed with Consultants/Other Providers [x ] YES  [ ] NO

## 2025-04-28 LAB
ANION GAP SERPL CALC-SCNC: 9 MMOL/L — SIGNIFICANT CHANGE UP (ref 7–14)
BASOPHILS # BLD AUTO: 0.02 K/UL — SIGNIFICANT CHANGE UP (ref 0–0.2)
BASOPHILS NFR BLD AUTO: 0.1 % — SIGNIFICANT CHANGE UP (ref 0–2)
BUN SERPL-MCNC: 12 MG/DL — SIGNIFICANT CHANGE UP (ref 7–23)
CALCIUM SERPL-MCNC: 8.4 MG/DL — SIGNIFICANT CHANGE UP (ref 8.4–10.5)
CHLORIDE SERPL-SCNC: 93 MMOL/L — LOW (ref 98–107)
CO2 SERPL-SCNC: 26 MMOL/L — SIGNIFICANT CHANGE UP (ref 22–31)
CREAT SERPL-MCNC: 0.81 MG/DL — SIGNIFICANT CHANGE UP (ref 0.5–1.3)
EGFR: 90 ML/MIN/1.73M2 — SIGNIFICANT CHANGE UP
EGFR: 90 ML/MIN/1.73M2 — SIGNIFICANT CHANGE UP
EOSINOPHIL # BLD AUTO: 0.08 K/UL — SIGNIFICANT CHANGE UP (ref 0–0.5)
EOSINOPHIL NFR BLD AUTO: 0.6 % — SIGNIFICANT CHANGE UP (ref 0–6)
GLUCOSE SERPL-MCNC: 91 MG/DL — SIGNIFICANT CHANGE UP (ref 70–99)
HCT VFR BLD CALC: 40 % — SIGNIFICANT CHANGE UP (ref 39–50)
HGB BLD-MCNC: 12.7 G/DL — LOW (ref 13–17)
IANC: 10.23 K/UL — HIGH (ref 1.8–7.4)
IMM GRANULOCYTES NFR BLD AUTO: 8.6 % — HIGH (ref 0–0.9)
LYMPHOCYTES # BLD AUTO: 0.67 K/UL — LOW (ref 1–3.3)
LYMPHOCYTES # BLD AUTO: 4.8 % — LOW (ref 13–44)
MAGNESIUM SERPL-MCNC: 2.2 MG/DL — SIGNIFICANT CHANGE UP (ref 1.6–2.6)
MCHC RBC-ENTMCNC: 28.9 PG — SIGNIFICANT CHANGE UP (ref 27–34)
MCHC RBC-ENTMCNC: 31.8 G/DL — LOW (ref 32–36)
MCV RBC AUTO: 90.9 FL — SIGNIFICANT CHANGE UP (ref 80–100)
MONOCYTES # BLD AUTO: 1.64 K/UL — HIGH (ref 0–0.9)
MONOCYTES NFR BLD AUTO: 11.9 % — SIGNIFICANT CHANGE UP (ref 2–14)
NEUTROPHILS # BLD AUTO: 10.23 K/UL — HIGH (ref 1.8–7.4)
NEUTROPHILS NFR BLD AUTO: 74 % — SIGNIFICANT CHANGE UP (ref 43–77)
NRBC # BLD AUTO: 0 K/UL — SIGNIFICANT CHANGE UP (ref 0–0)
NRBC # FLD: 0 K/UL — SIGNIFICANT CHANGE UP (ref 0–0)
NRBC BLD AUTO-RTO: 0 /100 WBCS — SIGNIFICANT CHANGE UP (ref 0–0)
PHOSPHATE SERPL-MCNC: 3.3 MG/DL — SIGNIFICANT CHANGE UP (ref 2.5–4.5)
PLATELET # BLD AUTO: 249 K/UL — SIGNIFICANT CHANGE UP (ref 150–400)
POTASSIUM SERPL-MCNC: 4.3 MMOL/L — SIGNIFICANT CHANGE UP (ref 3.5–5.3)
POTASSIUM SERPL-SCNC: 4.3 MMOL/L — SIGNIFICANT CHANGE UP (ref 3.5–5.3)
RBC # BLD: 4.4 M/UL — SIGNIFICANT CHANGE UP (ref 4.2–5.8)
RBC # FLD: 14.5 % — SIGNIFICANT CHANGE UP (ref 10.3–14.5)
SODIUM SERPL-SCNC: 128 MMOL/L — LOW (ref 135–145)
WBC # BLD: 13.83 K/UL — HIGH (ref 3.8–10.5)
WBC # FLD AUTO: 13.83 K/UL — HIGH (ref 3.8–10.5)

## 2025-04-28 PROCEDURE — 99231 SBSQ HOSP IP/OBS SF/LOW 25: CPT

## 2025-04-28 PROCEDURE — G0545: CPT

## 2025-04-28 PROCEDURE — 99232 SBSQ HOSP IP/OBS MODERATE 35: CPT | Mod: GC

## 2025-04-28 PROCEDURE — 99232 SBSQ HOSP IP/OBS MODERATE 35: CPT

## 2025-04-28 RX ORDER — CEFTRIAXONE 500 MG/1
2000 INJECTION, POWDER, FOR SOLUTION INTRAMUSCULAR; INTRAVENOUS EVERY 24 HOURS
Refills: 0 | Status: DISCONTINUED | OUTPATIENT
Start: 2025-04-28 | End: 2025-04-29

## 2025-04-28 RX ADMIN — OXYCODONE HYDROCHLORIDE 10 MILLIGRAM(S): 30 TABLET ORAL at 12:29

## 2025-04-28 RX ADMIN — OXYCODONE HYDROCHLORIDE 10 MILLIGRAM(S): 30 TABLET ORAL at 04:16

## 2025-04-28 RX ADMIN — LIDOCAINE HYDROCHLORIDE 1 PATCH: 20 JELLY TOPICAL at 05:45

## 2025-04-28 RX ADMIN — OXYCODONE HYDROCHLORIDE 10 MILLIGRAM(S): 30 TABLET ORAL at 16:05

## 2025-04-28 RX ADMIN — Medication 1 APPLICATION(S): at 05:47

## 2025-04-28 RX ADMIN — ROSUVASTATIN CALCIUM 10 MILLIGRAM(S): 20 TABLET, FILM COATED ORAL at 21:25

## 2025-04-28 RX ADMIN — OXYCODONE HYDROCHLORIDE 10 MILLIGRAM(S): 30 TABLET ORAL at 09:20

## 2025-04-28 RX ADMIN — OXYCODONE HYDROCHLORIDE 10 MILLIGRAM(S): 30 TABLET ORAL at 17:05

## 2025-04-28 RX ADMIN — OXYCODONE HYDROCHLORIDE 10 MILLIGRAM(S): 30 TABLET ORAL at 08:21

## 2025-04-28 RX ADMIN — OXYCODONE HYDROCHLORIDE 10 MILLIGRAM(S): 30 TABLET ORAL at 01:05

## 2025-04-28 RX ADMIN — LIDOCAINE HYDROCHLORIDE 1 PATCH: 20 JELLY TOPICAL at 12:25

## 2025-04-28 RX ADMIN — METOPROLOL SUCCINATE 50 MILLIGRAM(S): 50 TABLET, EXTENDED RELEASE ORAL at 05:45

## 2025-04-28 RX ADMIN — DEXTROMETHORPHAN HBR, GUAIFENESIN 1200 MILLIGRAM(S): 200 LIQUID ORAL at 05:44

## 2025-04-28 RX ADMIN — DEXTROMETHORPHAN HBR, GUAIFENESIN 1200 MILLIGRAM(S): 200 LIQUID ORAL at 16:59

## 2025-04-28 RX ADMIN — OXYCODONE HYDROCHLORIDE 10 MILLIGRAM(S): 30 TABLET ORAL at 21:25

## 2025-04-28 RX ADMIN — Medication 1 TABLET(S): at 12:24

## 2025-04-28 RX ADMIN — OXYCODONE HYDROCHLORIDE 10 MILLIGRAM(S): 30 TABLET ORAL at 13:30

## 2025-04-28 RX ADMIN — OXYCODONE HYDROCHLORIDE 10 MILLIGRAM(S): 30 TABLET ORAL at 02:00

## 2025-04-28 RX ADMIN — CEFTRIAXONE 100 MILLIGRAM(S): 500 INJECTION, POWDER, FOR SOLUTION INTRAMUSCULAR; INTRAVENOUS at 12:29

## 2025-04-28 RX ADMIN — OXYCODONE HYDROCHLORIDE 10 MILLIGRAM(S): 30 TABLET ORAL at 05:10

## 2025-04-28 RX ADMIN — OXYCODONE HYDROCHLORIDE 10 MILLIGRAM(S): 30 TABLET ORAL at 21:55

## 2025-04-28 RX ADMIN — Medication 81 MILLIGRAM(S): at 12:24

## 2025-04-28 NOTE — PROGRESS NOTE ADULT - SUBJECTIVE AND OBJECTIVE BOX
***************************************************************  Cammy Moreira, PGY1  Internal Medicine   Available on [employer] TEAMS  ***************************************************************    IDALMIS MONTEZ  79y  MRN: [id number]    Patient is a 79y old  Male who presents with a chief complaint of Pneumonia (27 Apr 2025 09:23)      Interval/Overnight Events: no events ON.     Subjective: Pt seen and examined at bedside. Denies fever, CP, SOB, abn pain, N/V, dysuria. Tolerating diet.      MEDICATIONS  (STANDING):  apixaban 5 milliGRAM(s) Oral every 12 hours  aspirin enteric coated 81 milliGRAM(s) Oral daily  chlorhexidine 2% Cloths 1 Application(s) Topical <User Schedule>  guaiFENesin ER 1200 milliGRAM(s) Oral every 12 hours  lidocaine   4% Patch 1 Patch Transdermal daily  metoprolol succinate ER 50 milliGRAM(s) Oral daily  multivitamin 1 Tablet(s) Oral daily  mupirocin 2% Nasal 1 Application(s) Both Nostrils two times a day  polyethylene glycol 3350 17 Gram(s) Oral two times a day  rosuvastatin 10 milliGRAM(s) Oral at bedtime  senna 2 Tablet(s) Oral at bedtime    MEDICATIONS  (PRN):  acetaminophen     Tablet .. 650 milliGRAM(s) Oral every 6 hours PRN Temp greater or equal to 38C (100.4F), Mild Pain (1 - 3)  benzonatate 100 milliGRAM(s) Oral three times a day PRN Cough  cyclobenzaprine 5 milliGRAM(s) Oral three times a day PRN Muscle Spasm  levalbuterol 45 MICROgram(s) HFA Inhaler 1 Puff(s) Inhalation every 6 hours PRN wheezing  ondansetron    Tablet 4 milliGRAM(s) Oral every 6 hours PRN Nausea and/or Vomiting  oxyCODONE    IR 5 milliGRAM(s) Oral every 3 hours PRN Moderate Pain (4 - 6)  oxyCODONE    IR 10 milliGRAM(s) Oral every 3 hours PRN Severe Pain (7 - 10)      Objective:    Vitals: Vital Signs Last 24 Hrs  T(C): 36.6 (04-27-25 @ 21:07), Max: 36.6 (04-27-25 @ 21:07)  T(F): 97.9 (04-27-25 @ 21:07), Max: 97.9 (04-27-25 @ 21:07)  HR: 87 (04-27-25 @ 21:07) (87 - 93)  BP: 101/60 (04-27-25 @ 21:07) (101/60 - 118/71)  BP(mean): --  RR: 18 (04-27-25 @ 21:07) (18 - 18)  SpO2: 94% (04-27-25 @ 21:07) (94% - 97%)                I&O's Summary    27 Apr 2025 07:01  -  28 Apr 2025 07:00  --------------------------------------------------------  IN: 1790 mL / OUT: 2000 mL / NET: -210 mL        PHYSICAL EXAM:  GENERAL: NAD  HEAD:  Atraumatic, Normocephalic  EYES: EOMI, conjunctiva and sclera clear  CHEST/LUNG: Clear to auscultation bilaterally; No rales, rhonchi, wheezing, or rubs  HEART: Regular rate and rhythm; No murmurs, rubs, or gallops  ABDOMEN: Soft, Nontender, Nondistended;   SKIN: No rashes or lesions  NERVOUS SYSTEM:  Alert & Oriented X3, no focal deficits    LABS:                        12.9   15.17 )-----------( 301      ( 27 Apr 2025 11:45 )             38.3                         12.3   16.59 )-----------( 273      ( 26 Apr 2025 05:26 )             38.5     04-27    133[L]  |  96[L]  |  13  ----------------------------<  87  4.5   |  27  |  0.84  04-26    133[L]  |  98  |  21  ----------------------------<  103[H]  4.2   |  23  |  0.88    Ca    8.6      27 Apr 2025 11:45  Ca    8.4      26 Apr 2025 05:26  Phos  3.1     04-27  Mg     2.20     04-27      CAPILLARY BLOOD GLUCOSE            Urinalysis Basic - ( 27 Apr 2025 11:45 )    Color: x / Appearance: x / SG: x / pH: x  Gluc: 87 mg/dL / Ketone: x  / Bili: x / Urobili: x   Blood: x / Protein: x / Nitrite: x   Leuk Esterase: x / RBC: x / WBC x   Sq Epi: x / Non Sq Epi: x / Bacteria: x          RADIOLOGY & ADDITIONAL TESTS:    Imaging Personally Reviewed:  [x ] YES  [ ] NO    Consultants involved in case:   Consultant(s) Notes Reviewed:  [ x] YES  [ ] NO:   Care Discussed with Consultants/Other Providers [x ] YES  [ ] NO         ***************************************************************  Cammy Moreira, PGY1  Internal Medicine   Available on [employer] TEAMS  ***************************************************************    IDALMIS MONTEZ  79y  MRN: [id number]    Patient is a 79y old  Male who presents with a chief complaint of Pneumonia (27 Apr 2025 09:23)      Interval/Overnight Events: no events ON. Sinus rhythm on tele.     Subjective: Pt seen and examined at bedside. No acute complaints, pain controlled with current regimen. No fevers, chills.     MEDICATIONS  (STANDING):  apixaban 5 milliGRAM(s) Oral every 12 hours  aspirin enteric coated 81 milliGRAM(s) Oral daily  chlorhexidine 2% Cloths 1 Application(s) Topical <User Schedule>  guaiFENesin ER 1200 milliGRAM(s) Oral every 12 hours  lidocaine   4% Patch 1 Patch Transdermal daily  metoprolol succinate ER 50 milliGRAM(s) Oral daily  multivitamin 1 Tablet(s) Oral daily  mupirocin 2% Nasal 1 Application(s) Both Nostrils two times a day  polyethylene glycol 3350 17 Gram(s) Oral two times a day  rosuvastatin 10 milliGRAM(s) Oral at bedtime  senna 2 Tablet(s) Oral at bedtime    MEDICATIONS  (PRN):  acetaminophen     Tablet .. 650 milliGRAM(s) Oral every 6 hours PRN Temp greater or equal to 38C (100.4F), Mild Pain (1 - 3)  benzonatate 100 milliGRAM(s) Oral three times a day PRN Cough  cyclobenzaprine 5 milliGRAM(s) Oral three times a day PRN Muscle Spasm  levalbuterol 45 MICROgram(s) HFA Inhaler 1 Puff(s) Inhalation every 6 hours PRN wheezing  ondansetron    Tablet 4 milliGRAM(s) Oral every 6 hours PRN Nausea and/or Vomiting  oxyCODONE    IR 5 milliGRAM(s) Oral every 3 hours PRN Moderate Pain (4 - 6)  oxyCODONE    IR 10 milliGRAM(s) Oral every 3 hours PRN Severe Pain (7 - 10)      Objective:    Vitals: Vital Signs Last 24 Hrs  T(C): 36.6 (04-27-25 @ 21:07), Max: 36.6 (04-27-25 @ 21:07)  T(F): 97.9 (04-27-25 @ 21:07), Max: 97.9 (04-27-25 @ 21:07)  HR: 87 (04-27-25 @ 21:07) (87 - 93)  BP: 101/60 (04-27-25 @ 21:07) (101/60 - 118/71)  BP(mean): --  RR: 18 (04-27-25 @ 21:07) (18 - 18)  SpO2: 94% (04-27-25 @ 21:07) (94% - 97%)                I&O's Summary    27 Apr 2025 07:01  -  28 Apr 2025 07:00  --------------------------------------------------------  IN: 1790 mL / OUT: 2000 mL / NET: -210 mL        PHYSICAL EXAM:  GENERAL: NAD, resting comfortably in chair   HEAD:  Atraumatic, Normocephalic  EYES: EOMI, conjunctiva and sclera clear  CHEST/LUNG: RLL with rhonchi, improved from prior   HEART: Regular rate and rhythm; No murmurs, rubs, or gallops  ABDOMEN: Soft, Nontender, Nondistended;   SKIN: No rashes or lesions  NERVOUS SYSTEM:  Alert & Oriented X3, no focal deficits    LABS:                        12.9   15.17 )-----------( 301      ( 27 Apr 2025 11:45 )             38.3                         12.3   16.59 )-----------( 273      ( 26 Apr 2025 05:26 )             38.5     04-27    133[L]  |  96[L]  |  13  ----------------------------<  87  4.5   |  27  |  0.84  04-26    133[L]  |  98  |  21  ----------------------------<  103[H]  4.2   |  23  |  0.88    Ca    8.6      27 Apr 2025 11:45  Ca    8.4      26 Apr 2025 05:26  Phos  3.1     04-27  Mg     2.20     04-27      CAPILLARY BLOOD GLUCOSE            Urinalysis Basic - ( 27 Apr 2025 11:45 )    Color: x / Appearance: x / SG: x / pH: x  Gluc: 87 mg/dL / Ketone: x  / Bili: x / Urobili: x   Blood: x / Protein: x / Nitrite: x   Leuk Esterase: x / RBC: x / WBC x   Sq Epi: x / Non Sq Epi: x / Bacteria: x          RADIOLOGY & ADDITIONAL TESTS:    Imaging Personally Reviewed:  [x ] YES  [ ] NO    Consultants involved in case:   Consultant(s) Notes Reviewed:  [ x] YES  [ ] NO:   Care Discussed with Consultants/Other Providers [x ] YES  [ ] NO

## 2025-04-28 NOTE — PROGRESS NOTE ADULT - TIME BILLING
Review of laboratory data, radiology results, consultants' recommendations, documentation in [place], discussion with patient/advanced care providers and interdisciplinary staff (such as , social workers, etc). Interventions were performed as documented above.

## 2025-04-28 NOTE — PROGRESS NOTE ADULT - ASSESSMENT
This is a 79 year old man with a hx of HTN, HLD, COPD, paroxysmal A-fib with loop recorder ( 10/2023, not on AC)presented with chills, chest pain, fatigue, shortness of breath, found to be hypotensive and admitted for Pneumonia. Blood culture with streptococcus pneumoniae for which ID consulted. IV ABX started and repeat blood culture negative on 4/21. Hospital course complicated by Afib RVR VR 130s, and pt. remains asymptmatic throughout. Patient is now in SR. EP will sign off    ## Strep pneumoniae bacteremia 2/2 RLL PNA  ## Afib        Plan:  - Continuous telemetric monitoring  - Monitor electrolytes and replete K to 4 and Mg to 2  -TTE shows EF 64%, mild to mod. MR, mild AR.   -ILR interrogation reveals Afib episode on 4/20/2025 and 4/24/2025with an afib burden 2.2%  - Continue Eliquis 5mg po BID for thromboembolic ppx  given that patient has a [id number] score of   - Continue metoprolol 50mg po BID  - Patient is schedule for an appointment on_______________________  ( 4th floor Oncology building [facility] [address], [place], [place], [address] [phone] )  - Continue care per primary team    Patient to be staff with attending. Please await attending addendum    This is a 79 year old man with a hx of HTN, HLD, COPD, paroxysmal A-fib with loop recorder ( 10/2023, not on AC)presented with chills, chest pain, fatigue, shortness of breath, found to be hypotensive and admitted for Pneumonia. Blood culture with streptococcus pneumoniae for which ID consulted. IV ABX started and repeat blood culture negative on 4/21. Hospital course complicated by Afib RVR VR 130s, and pt. remains asymptomatic throughout. Patient is now in SR. EP will sign off    ## Strep pneumoniae bacteremia 2/2 RLL PNA  ## Afib      Plan:  - Continuous telemetric monitoring  - Monitor electrolytes and replete K to 4 and Mg to 2  -TTE shows EF 64%, mild to mod. MR, mild AR.   -ILR interrogation reveals Afib episode on 4/20/2025 and 4/24/2025with an afib burden 2.2%  - Continue Eliquis 5mg po BID for thromboembolic ppx given that patient has a [id number] score of 3   - Continue metoprolol 50mg po BID  - Patient is schedule for an appointment on 5/22/2025 at 1:00pm (4th floor Oncology building [facility] [address], [address], [place], [place], [address] [phone] )  - Continue care per primary team    Patient to be staff with attending. Please await attending addendum

## 2025-04-28 NOTE — PROGRESS NOTE ADULT - SUBJECTIVE AND OBJECTIVE BOX
Infectious Diseases Follow Up:    Patient is a 79y old  Male who presents with a chief complaint of Pneumonia (28 Apr 2025 08:27)      Interval History/ROS:  Pain is better controlled today, no fevers, chills, abdominal pain, diarrhea.   Some pleuritic CP    Allergies  No Known Allergies        ANTIMICROBIALS:  cefTRIAXone   IVPB 2000 every 24 hours      Current Abx:     Previous Abx     OTHER MEDS:  MEDICATIONS  (STANDING):  acetaminophen     Tablet .. 650 every 6 hours PRN  apixaban 5 every 12 hours  aspirin enteric coated 81 daily  benzonatate 100 three times a day PRN  cyclobenzaprine 5 three times a day PRN  guaiFENesin ER 1200 every 12 hours  levalbuterol 45 MICROgram(s) HFA Inhaler 1 every 6 hours PRN  metoprolol succinate ER 50 daily  ondansetron    Tablet 4 every 6 hours PRN  oxyCODONE    IR 5 every 3 hours PRN  oxyCODONE    IR 10 every 3 hours PRN  polyethylene glycol 3350 17 two times a day  rosuvastatin 10 at bedtime  senna 2 at bedtime      Vital Signs Last 24 Hrs  T(C): 36.8 (28 Apr 2025 12:19), Max: 36.9 (28 Apr 2025 05:44)  T(F): 98.3 (28 Apr 2025 12:19), Max: 98.4 (28 Apr 2025 05:44)  HR: 91 (28 Apr 2025 12:19) (77 - 91)  BP: 118/66 (28 Apr 2025 12:19) (101/60 - 118/66)  BP(mean): --  RR: 18 (28 Apr 2025 12:19) (17 - 18)  SpO2: 97% (28 Apr 2025 12:19) (94% - 97%)    Parameters below as of 28 Apr 2025 12:19  Patient On (Oxygen Delivery Method): room air        PHYSICAL EXAM:  GENERAL: NAD, well-developed  HEAD:  Atraumatic, Normocephalic  EYES: EOMI, PERRLA, conjunctiva and sclera clear  NECK: Supple, No JVD  CHEST/LUNG: Clear to auscultation bilaterally; Decreased R sided breath sounds   HEART: Regular rate and rhythm  ABDOMEN: Soft, Nontender, Nondistended; Bowel sounds present  PSYCH: AAOx3                          12.7   13.83 )-----------( 249      ( 28 Apr 2025 07:30 )             40.0       04-28    128[L]  |  93[L]  |  12  ----------------------------<  91  4.3   |  26  |  0.81    Ca    8.4      28 Apr 2025 07:30  Phos  3.3     04-28  Mg     2.20     04-28        Urinalysis Basic - ( 28 Apr 2025 07:30 )    Color: x / Appearance: x / SG: x / pH: x  Gluc: 91 mg/dL / Ketone: x  / Bili: x / Urobili: x   Blood: x / Protein: x / Nitrite: x   Leuk Esterase: x / RBC: x / WBC x   Sq Epi: x / Non Sq Epi: x / Bacteria: x        MICROBIOLOGY:  v  Blood Blood  04-21-25   No growth at 5 days  --  --      Blood Blood  04-21-25   No growth at 5 days  --  --      Blood Blood-Venous  04-19-25   Growth in aerobic and anaerobic bottles: Streptococcus pneumoniae  Direct identification is available within approximately 3-5  hours either by Blood Panel Multiplexed PCR or Direct  MALDI-TOF. Details: [email or web address]  --  Blood Culture PCR  Streptococcus pneumoniae      Blood Blood-Peripheral  04-19-25   Growth in aerobic and anaerobic bottles: Streptococcus pneumoniae  See previous culture [id number]  --    Growth in aerobic bottle: Gram Positive Cocci in Pairs and Chains  Growth in anaerobic bottle: Gram Positive Cocci in Pairs and Chains                RADIOLOGY:

## 2025-04-28 NOTE — PROGRESS NOTE ADULT - PROBLEM SELECTOR PLAN 11
DVT ppx: SCDs, patient refusing AC   Diet: DASH  Dispo: Pending pain control DVT ppx: SCDs, patient refusing AC   Diet: DASH  Dispo: Pending repeat Bcx, then plan to DC DVT ppx: SCDs, patient refusing AC   Diet: DASH  Dispo: Pending repeat Bcx, then plan to DC tomorrow

## 2025-04-28 NOTE — PROGRESS NOTE ADULT - NS ATTEND AMEND GEN_ALL_CORE FT
79y old  Male with PMH of HTN, HLD, COPD, paroxysmal A-fib with loop recorder (10/2023, not on AC) presented with chills, chest pain, fatigue, shortness of breath, found to be hypotensive and admitted for pneumonia. Blood culture demonstrated streptococcus pneumoniae for which ID consulted. IV ABX started and repeat blood culture negative on 4/21. Hospital course complicated by Afib RVR VR 130s, and pt. remains asymptomatic throughout. Initially EKG was NSR and Tele reveals Afib 100s-110s mostly currently. ILR interrogation reveals Afib episode on 4/20/2025 and 4/24/2025. Of note, ILR was implanted in 10/2023 for long term monitoring of Afib and to allow for patient to remain off AC. TTE shows EF 64%, mild to mod. MR, mild AR. Repeatedly advised patient to take anticoagulation to avoid stroke.  However, he is refusing currently.  Recommend AC Eliquis for thromboembolic ppx if no clinical contraindication,  [id number] score 3. Continue BB Metoprolol and uptitrate for rate control if BP tolerates. Will hold off with cardioversion for now until PNA resolves.  Patient complaining of right sided chest pain being treated with opioids.
pAF now in SR, on eliquis. Plan for outpt fu.

## 2025-04-28 NOTE — PROGRESS NOTE ADULT - ATTENDING COMMENTS
79M HTN, HLD, COPD/asthm, pAF with loop recorder not on AC admit with strep pneumoniae PNA c/b strep bacteremia, KEVIN, Afib RVR.     # Sepsis/strep bacteremia and PNA due to strep pneumoniae: Continues to have pleuritic chest pain, similar to the past few days, Saturating well on On RA. C/w ceftriaxone, with plan to Transition to levaquin on d/c till 5/4. Repeat BCx NGTD. WBC trending from 14 k to 16k, patient afebrile, repeat CXR showing PNA + atelectasis. BCx sent 4/28. WBC now downtrending. F/u BCx and if no growth will d/c tomorrow.    #R sided pleuritic chest pain/flank pain-seems to be pleuritic in nature, likely 2/2 PNA. CT scan/ MRI reviewed, Repeat CT negative.  Patient on PO oxy regimen. Pain ins controlled.     # KEVIN-improved post IVF    # Pancreatic lesion: will need OP f/u    # Afib w/ RVR, pt. has loop recorder. Now converted back to sinus rhythm 4/27. On metoprolol. Patient is refusing Eliquis despite multiple discussions about risk vs benefit. He does not want to take a pill every day. For now, he is refusing Eliquis.

## 2025-04-28 NOTE — PROGRESS NOTE ADULT - PROBLEM SELECTOR PLAN 3
Patient with one episode of paroxysmal afib in the setting of taking albuterol inhaler frequently over the course of one day.  Per patient, no further episodes of afib recorded with ILR  Not on AC, Chadsvasc score 2  EKG on 4/23 with afib and rate in 110s, 4/27 sinus rhythm   TTE grossly unremarkable   Loop recorder interrogated, afib burden 2.2%    PLAN:  - Metoprolol 50mg qd   - Tele monitoring   - EP consult, recommend AC, patient continues to decline AC and ablation Patient with one episode of paroxysmal afib in the setting of taking albuterol inhaler frequently over the course of one day.  Per patient, no further episodes of afib recorded with ILR  Not on AC, Chadsvasc score 2  EKG on 4/23 with afib and rate in 110s, 4/27 sinus rhythm   TTE grossly unremarkable   Loop recorder interrogated, afib burden 2.2%    PLAN:  - Metoprolol 50mg qd   - Tele monitoring   - EP consult, recommend AC, patient continues to decline AC and ablation/DCCV, outpatient f/u

## 2025-04-28 NOTE — PROGRESS NOTE ADULT - PROBLEM SELECTOR PLAN 1
- CTA PE notable for RLL pneumonia and left linear atelectasis  - s/p CTX and azithromycin in the ED  - full RVP negative, CXR clear  - bcx strep pneumo pansensitive   - Leukocytosis initially downtrending, WBC 16 as of 4/26 with neutrophil predominance, BP lower than baseline    PLAN:  - Repeat CXR, touch base with ID   - urine strep, legionella -> negative   - Nasal swab positive for staph aureus   - cw ctx 2g q24 per ID, plan for total 14 days of abx, dc abx recs per ID  - pain regimen: PO Oxycodone 5mg and 10mg for mod/sev pain  - zofran 4mg PO prn for nausea  - speech eval, thin liquids and regular diet - CTA PE notable for RLL pneumonia and left linear atelectasis  - s/p CTX and azithromycin in the ED  - full RVP negative, CXR clear  - bcx strep pneumo pansensitive   - Leukocytosis initially downtrending, WBC 16 as of 4/26 with neutrophil predominance, BP lower than baseline    PLAN:  - Repeat CXR, RLL PNA and pleural effusion unchanged from prior   - urine strep, legionella -> negative   - Nasal swab positive for staph aureus   - cw ctx 2g q24 per ID, plan for total 14 days of abx, dc abx recs per ID  - pain regimen: PO Oxycodone 5mg and 10mg for mod/sev pain  - zofran 4mg PO prn for nausea  - speech eval, thin liquids and regular diet

## 2025-04-28 NOTE — PROGRESS NOTE ADULT - ASSESSMENT
This is a 78 y/o M w/ PMHx of HTN, HLD, COPD, pAF w/ loop recorder admitted to [facility] on 4/19/25 for chills, CP, SOB, found to be hypotensive, hypoxic, WBC 32.   CTA Chest w/o PE, had RLL consolidation, c/f PNA, started on Ceftriaxone/Azithromycin.   BCx w/ Strep pneumoniae.    #Strep pneumoniae bacteremia 2/2 RLL PNA   #Septic shock  #Hypoxic respiratory failure   #KEVIN, improving    Overall, 78 y/o M w/ PMhx of HTN, HLD, COPD, pAF w/ loop recorder admitted to [facility] on 4/19/25 for Strep pneumoniae bacteremia 2/2 RLL PNA. Leukocytosis resolving, however pt still w/ R sided pain, not improving     Recommendations:   1. Ceftriaxone 2 g q24, will plan for 14 day course ending 5/4/25, will plan on finishing course with Levofloxacin 750 mg q24 (last QTc < 500)   2, Pain controlled today   3. BCx NG  4. TTE w/o IE     Thank you for consulting us and involving us in the management of this patient's case. In addition to reviewing history, imaging, documents, labs, microbiology, and infection control strategies and potential issues.     Inpatient ID consult team will sign off.    Further changes in lab values, imaging studies, or clinical status will not be known to ID inpatient consultants unless specifically communicated by primary team.    Bernard Ayala MD  Attending Physician  Division of Infectious Diseases  Department of Medicine    Please contact through MS Teams message.  Office: [phone] (after 5 PM or weekend)

## 2025-04-28 NOTE — PROGRESS NOTE ADULT - PROBLEM SELECTOR PLAN 2
- New R flank/lower back pain developed over the weekend   - Severe, very tender to touch  - Afebrile but with continued leukocytosis despite cultures growing pansensitive strep pneumo  - Concern for stone vs obstruction vs abscess/other intraabdominal infection vs MSK pain  - Imaging findings negative thus far    Plan:   - CT AP: interval mild improvement in PNA, pancreatic uncinate process cystic lesion, 3.7 cm, increased compared to   prior 2017  - US kidney bladder: normal   - MR L spine with degenerative changes   - Repeat UA normal   - Flexiril 5mg TID PRN, PO Oxycodone 5mg and 10mg for mod/sev pain  - s/p 5mg Flexeril TID for 2 days  - s/p toradal 15mg TID for 2 days

## 2025-04-28 NOTE — PROGRESS NOTE ADULT - PROBLEM SELECTOR PLAN 4
Meeting sepsis criteria with leukocytosis, hypotension, lactate, borderline bandemia  s/p 2.5L IVF in ED  s/p CTX and azithromycin in ED  bacteremic with strep pneumo pending sensitivities  RESOLVED     PLAN:  - antibiotics as above  - Repeat Bcx sent 4/21, NGTD Meeting sepsis criteria with leukocytosis, hypotension, lactate, borderline bandemia  s/p 2.5L IVF in ED  s/p CTX and azithromycin in ED  bacteremic with strep pneumo pending sensitivities  RESOLVED     PLAN:  - antibiotics as above  - Repeat Bcx sent 4/21, NGTD  - Repeat Bcx sent 4/28, pending

## 2025-04-28 NOTE — PROGRESS NOTE ADULT - SUBJECTIVE AND OBJECTIVE BOX
Subjective: Admits to some SOB on NC. Denies HA, lightheadedness, dizziness, CP, palpitation, abdominal pain, and N/V.      Interval Events:  - P/w chest pain, fatigue, shortness of breath for the last day. In the ED, patient was hypotensive to 84/52, tmax 99.8. Desatted to 89% on room air. Labs notable for leukocytosis to 32.01, 9.3% bands, d-dimer 614, creatinine 1.8 (baseline ~1), bilirubin 3.8, pro-BNP 1450, lactate 3.9 > 3.4. RVP negative. CTA PE with no PE, RLL consolidation with obstructed right lower lobe airways, likely pneumonia. EKG collected with NSR. Given azithromycin 500mg x1, CTX 1g x 1, toradol 15mg, solumedrol 125mg x 1, morphine 4mg IV x1, 2L NS, given duonebs x 2, and admitted to medicine.  - Blood cx was positive for  streptococcus pneumoniae and ID was consulted who recommended Ceftriaxone 2 g q24 for14 day course and will plan on finishing course with Levofloxacin.   - EP was called for ILR interrogation which revealed  Afib episode on 4/20/2025 and 4/24/2025 with an afib burden 2.2%. Patient is not on anticoagulation as he refuses. TTE shows EF 64%, mild to mod. MR, mild AR. Patient also went into Afib on 4/23/2023. Patient is now in SR.  - Patient c/o R flank pain s/p US kidney bladder which was unremarkable and  MR L spine with degenerative changes     MEDICATIONS  (STANDING):  apixaban 5 milliGRAM(s) Oral every 12 hours  aspirin enteric coated 81 milliGRAM(s) Oral daily  chlorhexidine 2% Cloths 1 Application(s) Topical <User Schedule>  guaiFENesin ER 1200 milliGRAM(s) Oral every 12 hours  lidocaine   4% Patch 1 Patch Transdermal daily  metoprolol succinate ER 50 milliGRAM(s) Oral daily  multivitamin 1 Tablet(s) Oral daily  mupirocin 2% Nasal 1 Application(s) Both Nostrils two times a day  polyethylene glycol 3350 17 Gram(s) Oral two times a day  rosuvastatin 10 milliGRAM(s) Oral at bedtime  senna 2 Tablet(s) Oral at bedtime    MEDICATIONS  (PRN):  acetaminophen     Tablet .. 650 milliGRAM(s) Oral every 6 hours PRN Temp greater or equal to 38C (100.4F), Mild Pain (1 - 3)  benzonatate 100 milliGRAM(s) Oral three times a day PRN Cough  cyclobenzaprine 5 milliGRAM(s) Oral three times a day PRN Muscle Spasm  levalbuterol 45 MICROgram(s) HFA Inhaler 1 Puff(s) Inhalation every 6 hours PRN wheezing  ondansetron    Tablet 4 milliGRAM(s) Oral every 6 hours PRN Nausea and/or Vomiting  oxyCODONE    IR 5 milliGRAM(s) Oral every 3 hours PRN Moderate Pain (4 - 6)  oxyCODONE    IR 10 milliGRAM(s) Oral every 3 hours PRN Severe Pain (7 - 10)      Vital Signs Last 24 Hrs  T(C): 36.9 (28 Apr 2025 05:44), Max: 36.9 (28 Apr 2025 05:44)  T(F): 98.4 (28 Apr 2025 05:44), Max: 98.4 (28 Apr 2025 05:44)  HR: 77 (28 Apr 2025 05:44) (77 - 93)  BP: 102/62 (28 Apr 2025 05:44) (101/60 - 118/71)  BP(mean): --  RR: 17 (28 Apr 2025 05:44) (17 - 18)  SpO2: 97% (28 Apr 2025 05:44) (94% - 97%)    Parameters below as of 28 Apr 2025 05:44  Patient On (Oxygen Delivery Method): room air      I&O's Detail    27 Apr 2025 07:01  -  28 Apr 2025 07:00  --------------------------------------------------------  IN:    Oral Fluid: 1890 mL  Total IN: 1890 mL    OUT:    Voided (mL): 2500 mL  Total OUT: 2500 mL    Total NET: -610 mL          Physical Exam:  GENERAL: Lying in bed, well appearing, speaking in full sentence, in NAD  HEART: S1S2 RRR  PULMONARY: CTABL, normal respiratory effort  ABDOMEN: + Bowel sounds present, soft  EXTREMITIES:  Warm, well -perfused, no pedal edema, distal pulses present  NEUROLOGICAL:AOx3     TELEMETERIC: SR hr 70-90S bpm                            12.9   15.17 )-----------( 301      ( 27 Apr 2025 11:45 )             38.3       04-28    128[L]  |  93[L]  |  12  ----------------------------<  91  4.3   |  26  |  0.81    Ca    8.4      28 Apr 2025 07:30  Phos  3.3     04-28  Mg     2.20     04-28        TTE 4/21/25  CONCLUSIONS:      1. Left ventricular cavity is normal in size. Left ventricular wall thickness is normal. Left ventricular systolic function is normal with anejection fraction of 64 % by Remy's method of disks. There are no regional wall motion abnormalities seen.   2. Normal right ventricular cavity size and normal right ventricular systolic function. Tricuspid annular plane systolic excursion (TAPSE)is 2.1 cm (normal >=1.7 cm).   3. Structurally normal mitral valve with normal leaflet excursion. There is calcification of the mitral valve annulus. There is mild to moderate mitral regurgitation.   4. The aortic valve appears trileaflet with normalsystolic excursion. There is calcification of the aortic valve leaflets. There is mild aortic regurgitation.

## 2025-04-28 NOTE — PROGRESS NOTE ADULT - ASSESSMENT
Patient is a 80 y/o male h/o HTN, HLD, COPD, paroxysmal A-fib with loop recorder not on AC c/o chest pain, fatigue, shortness of breath for the last day. Patient meeting sepsis criteria with leukocytosis, hypotension, borderline bandemia, with a lactate, findings on CTA PE notable for RLL pneumonia. ID consulted, likely will need 2 weeks of total abx, cultures with pansensitive strep pneumo. Repeat imaging for severe new R lower back pain negative. MR lumbar spine with degenerative changes. EKG with new afib RVR with rates in 130s, now back in sinus as of 4/27 7am.

## 2025-04-29 ENCOUNTER — TRANSCRIPTION ENCOUNTER (OUTPATIENT)
Age: 80
End: 2025-04-29

## 2025-04-29 VITALS
DIASTOLIC BLOOD PRESSURE: 62 MMHG | SYSTOLIC BLOOD PRESSURE: 110 MMHG | TEMPERATURE: 98 F | OXYGEN SATURATION: 98 % | RESPIRATION RATE: 17 BRPM | HEART RATE: 87 BPM

## 2025-04-29 LAB
ANION GAP SERPL CALC-SCNC: 11 MMOL/L — SIGNIFICANT CHANGE UP (ref 7–14)
BUN SERPL-MCNC: 10 MG/DL — SIGNIFICANT CHANGE UP (ref 7–23)
CALCIUM SERPL-MCNC: 8.6 MG/DL — SIGNIFICANT CHANGE UP (ref 8.4–10.5)
CHLORIDE SERPL-SCNC: 94 MMOL/L — LOW (ref 98–107)
CO2 SERPL-SCNC: 27 MMOL/L — SIGNIFICANT CHANGE UP (ref 22–31)
CREAT SERPL-MCNC: 0.82 MG/DL — SIGNIFICANT CHANGE UP (ref 0.5–1.3)
EGFR: 89 ML/MIN/1.73M2 — SIGNIFICANT CHANGE UP
EGFR: 89 ML/MIN/1.73M2 — SIGNIFICANT CHANGE UP
GLUCOSE SERPL-MCNC: 84 MG/DL — SIGNIFICANT CHANGE UP (ref 70–99)
HCT VFR BLD CALC: 37.3 % — LOW (ref 39–50)
HGB BLD-MCNC: 12.3 G/DL — LOW (ref 13–17)
MAGNESIUM SERPL-MCNC: 2.2 MG/DL — SIGNIFICANT CHANGE UP (ref 1.6–2.6)
MCHC RBC-ENTMCNC: 29 PG — SIGNIFICANT CHANGE UP (ref 27–34)
MCHC RBC-ENTMCNC: 33 G/DL — SIGNIFICANT CHANGE UP (ref 32–36)
MCV RBC AUTO: 88 FL — SIGNIFICANT CHANGE UP (ref 80–100)
NRBC # BLD AUTO: 0 K/UL — SIGNIFICANT CHANGE UP (ref 0–0)
NRBC # FLD: 0 K/UL — SIGNIFICANT CHANGE UP (ref 0–0)
NRBC BLD AUTO-RTO: 0 /100 WBCS — SIGNIFICANT CHANGE UP (ref 0–0)
PHOSPHATE SERPL-MCNC: 3.1 MG/DL — SIGNIFICANT CHANGE UP (ref 2.5–4.5)
PLATELET # BLD AUTO: 335 K/UL — SIGNIFICANT CHANGE UP (ref 150–400)
POTASSIUM SERPL-MCNC: 4.5 MMOL/L — SIGNIFICANT CHANGE UP (ref 3.5–5.3)
POTASSIUM SERPL-SCNC: 4.5 MMOL/L — SIGNIFICANT CHANGE UP (ref 3.5–5.3)
RBC # BLD: 4.24 M/UL — SIGNIFICANT CHANGE UP (ref 4.2–5.8)
RBC # FLD: 14.3 % — SIGNIFICANT CHANGE UP (ref 10.3–14.5)
SODIUM SERPL-SCNC: 132 MMOL/L — LOW (ref 135–145)
WBC # BLD: 11.82 K/UL — HIGH (ref 3.8–10.5)
WBC # FLD AUTO: 11.82 K/UL — HIGH (ref 3.8–10.5)

## 2025-04-29 PROCEDURE — 99239 HOSP IP/OBS DSCHRG MGMT >30: CPT | Mod: GC

## 2025-04-29 RX ORDER — B1/B2/B3/B5/B6/B12/VIT C/FOLIC 500-0.5 MG
1 TABLET ORAL
Qty: 0 | Refills: 0 | DISCHARGE
Start: 2025-04-29

## 2025-04-29 RX ORDER — OXYCODONE HYDROCHLORIDE 30 MG/1
1 TABLET ORAL
Qty: 18 | Refills: 0
Start: 2025-04-29 | End: 2025-05-01

## 2025-04-29 RX ORDER — METOPROLOL SUCCINATE 50 MG/1
1 TABLET, EXTENDED RELEASE ORAL
Qty: 30 | Refills: 0
Start: 2025-04-29 | End: 2025-05-28

## 2025-04-29 RX ORDER — LEVALBUTEROL HYDROCHLORIDE 1.25 MG/3ML
1 SOLUTION RESPIRATORY (INHALATION)
Qty: 1 | Refills: 0
Start: 2025-04-29

## 2025-04-29 RX ORDER — LEVOFLOXACIN 25 MG/ML
1 SOLUTION ORAL
Qty: 5 | Refills: 0
Start: 2025-04-29 | End: 2025-05-03

## 2025-04-29 RX ADMIN — OXYCODONE HYDROCHLORIDE 10 MILLIGRAM(S): 30 TABLET ORAL at 14:52

## 2025-04-29 RX ADMIN — OXYCODONE HYDROCHLORIDE 10 MILLIGRAM(S): 30 TABLET ORAL at 11:20

## 2025-04-29 RX ADMIN — DEXTROMETHORPHAN HBR, GUAIFENESIN 1200 MILLIGRAM(S): 200 LIQUID ORAL at 05:37

## 2025-04-29 RX ADMIN — OXYCODONE HYDROCHLORIDE 10 MILLIGRAM(S): 30 TABLET ORAL at 06:07

## 2025-04-29 RX ADMIN — CEFTRIAXONE 100 MILLIGRAM(S): 500 INJECTION, POWDER, FOR SOLUTION INTRAMUSCULAR; INTRAVENOUS at 11:38

## 2025-04-29 RX ADMIN — LIDOCAINE HYDROCHLORIDE 1 PATCH: 20 JELLY TOPICAL at 11:38

## 2025-04-29 RX ADMIN — OXYCODONE HYDROCHLORIDE 10 MILLIGRAM(S): 30 TABLET ORAL at 05:37

## 2025-04-29 RX ADMIN — OXYCODONE HYDROCHLORIDE 10 MILLIGRAM(S): 30 TABLET ORAL at 02:02

## 2025-04-29 RX ADMIN — OXYCODONE HYDROCHLORIDE 10 MILLIGRAM(S): 30 TABLET ORAL at 01:32

## 2025-04-29 RX ADMIN — OXYCODONE HYDROCHLORIDE 10 MILLIGRAM(S): 30 TABLET ORAL at 10:23

## 2025-04-29 RX ADMIN — Medication 1 APPLICATION(S): at 05:43

## 2025-04-29 RX ADMIN — Medication 1 TABLET(S): at 10:23

## 2025-04-29 RX ADMIN — Medication 81 MILLIGRAM(S): at 10:23

## 2025-04-29 RX ADMIN — METOPROLOL SUCCINATE 50 MILLIGRAM(S): 50 TABLET, EXTENDED RELEASE ORAL at 05:39

## 2025-04-29 NOTE — PROGRESS NOTE ADULT - SUBJECTIVE AND OBJECTIVE BOX
***************************************************************  Cammy Moreira, PGY1  Internal Medicine   Available on [employer] TEAMS  ***************************************************************    IDALMIS MONTEZ  79y  MRN: [id number]    Patient is a 79y old  Male who presents with a chief complaint of Pneumonia (28 Apr 2025 12:23)      Interval/Overnight Events: no events ON.     Subjective: Pt seen and examined at bedside. Denies fever, CP, SOB, abn pain, N/V, dysuria. Tolerating diet.      MEDICATIONS  (STANDING):  apixaban 5 milliGRAM(s) Oral every 12 hours  aspirin enteric coated 81 milliGRAM(s) Oral daily  cefTRIAXone   IVPB 2000 milliGRAM(s) IV Intermittent every 24 hours  chlorhexidine 2% Cloths 1 Application(s) Topical <User Schedule>  guaiFENesin ER 1200 milliGRAM(s) Oral every 12 hours  lidocaine   4% Patch 1 Patch Transdermal daily  metoprolol succinate ER 50 milliGRAM(s) Oral daily  multivitamin 1 Tablet(s) Oral daily  mupirocin 2% Nasal 1 Application(s) Both Nostrils two times a day  polyethylene glycol 3350 17 Gram(s) Oral two times a day  rosuvastatin 10 milliGRAM(s) Oral at bedtime  senna 2 Tablet(s) Oral at bedtime    MEDICATIONS  (PRN):  acetaminophen     Tablet .. 650 milliGRAM(s) Oral every 6 hours PRN Temp greater or equal to 38C (100.4F), Mild Pain (1 - 3)  benzonatate 100 milliGRAM(s) Oral three times a day PRN Cough  cyclobenzaprine 5 milliGRAM(s) Oral three times a day PRN Muscle Spasm  levalbuterol 45 MICROgram(s) HFA Inhaler 1 Puff(s) Inhalation every 6 hours PRN wheezing  ondansetron    Tablet 4 milliGRAM(s) Oral every 6 hours PRN Nausea and/or Vomiting  oxyCODONE    IR 5 milliGRAM(s) Oral every 3 hours PRN Moderate Pain (4 - 6)  oxyCODONE    IR 10 milliGRAM(s) Oral every 3 hours PRN Severe Pain (7 - 10)      Objective:    Vitals: Vital Signs Last 24 Hrs  T(C): 36.8 (04-29-25 @ 05:35), Max: 37 (04-28-25 @ 20:40)  T(F): 98.3 (04-29-25 @ 05:35), Max: 98.6 (04-28-25 @ 20:40)  HR: 81 (04-29-25 @ 05:35) (76 - 91)  BP: 124/63 (04-29-25 @ 05:35) (108/62 - 124/63)  BP(mean): --  RR: 18 (04-29-25 @ 05:35) (18 - 18)  SpO2: 96% (04-29-25 @ 05:35) (96% - 98%)                I&O's Summary    28 Apr 2025 07:01  -  29 Apr 2025 07:00  --------------------------------------------------------  IN: 840 mL / OUT: 900 mL / NET: -60 mL        PHYSICAL EXAM:  GENERAL: NAD  HEAD:  Atraumatic, Normocephalic  EYES: EOMI, conjunctiva and sclera clear  CHEST/LUNG: Clear to auscultation bilaterally; No rales, rhonchi, wheezing, or rubs  HEART: Regular rate and rhythm; No murmurs, rubs, or gallops  ABDOMEN: Soft, Nontender, Nondistended;   SKIN: No rashes or lesions  NERVOUS SYSTEM:  Alert & Oriented X3, no focal deficits    LABS:                        12.3   11.82 )-----------( 335      ( 29 Apr 2025 06:37 )             37.3                         12.7   13.83 )-----------( 249      ( 28 Apr 2025 07:30 )             40.0                         12.9   15.17 )-----------( 301      ( 27 Apr 2025 11:45 )             38.3     04-29    132[L]  |  94[L]  |  10  ----------------------------<  84  4.5   |  27  |  0.82  04-28    128[L]  |  93[L]  |  12  ----------------------------<  91  4.3   |  26  |  0.81  04-27    133[L]  |  96[L]  |  13  ----------------------------<  87  4.5   |  27  |  0.84    Ca    8.6      29 Apr 2025 06:37  Ca    8.4      28 Apr 2025 07:30  Ca    8.6      27 Apr 2025 11:45  Phos  3.1     04-29  Mg     2.20     04-29      CAPILLARY BLOOD GLUCOSE            Urinalysis Basic - ( 29 Apr 2025 06:37 )    Color: x / Appearance: x / SG: x / pH: x  Gluc: 84 mg/dL / Ketone: x  / Bili: x / Urobili: x   Blood: x / Protein: x / Nitrite: x   Leuk Esterase: x / RBC: x / WBC x   Sq Epi: x / Non Sq Epi: x / Bacteria: x          RADIOLOGY & ADDITIONAL TESTS:    Imaging Personally Reviewed:  [x ] YES  [ ] NO    Consultants involved in case:   Consultant(s) Notes Reviewed:  [ x] YES  [ ] NO:   Care Discussed with Consultants/Other Providers [x ] YES  [ ] NO         ***************************************************************  Cammy Moreira, PGY1  Internal Medicine   Available on [employer] TEAMS  ***************************************************************    IDALMIS MONTEZ  79y  MRN: [id number]    Patient is a 79y old  Male who presents with a chief complaint of Pneumonia (28 Apr 2025 12:23)      Interval/Overnight Events: no events ON.     Subjective: Pt seen and examined at bedside. Patient is declining to follow up with EP on 05/22. No other acute complaints.       MEDICATIONS  (STANDING):  apixaban 5 milliGRAM(s) Oral every 12 hours  aspirin enteric coated 81 milliGRAM(s) Oral daily  cefTRIAXone   IVPB 2000 milliGRAM(s) IV Intermittent every 24 hours  chlorhexidine 2% Cloths 1 Application(s) Topical <User Schedule>  guaiFENesin ER 1200 milliGRAM(s) Oral every 12 hours  lidocaine   4% Patch 1 Patch Transdermal daily  metoprolol succinate ER 50 milliGRAM(s) Oral daily  multivitamin 1 Tablet(s) Oral daily  mupirocin 2% Nasal 1 Application(s) Both Nostrils two times a day  polyethylene glycol 3350 17 Gram(s) Oral two times a day  rosuvastatin 10 milliGRAM(s) Oral at bedtime  senna 2 Tablet(s) Oral at bedtime    MEDICATIONS  (PRN):  acetaminophen     Tablet .. 650 milliGRAM(s) Oral every 6 hours PRN Temp greater or equal to 38C (100.4F), Mild Pain (1 - 3)  benzonatate 100 milliGRAM(s) Oral three times a day PRN Cough  cyclobenzaprine 5 milliGRAM(s) Oral three times a day PRN Muscle Spasm  levalbuterol 45 MICROgram(s) HFA Inhaler 1 Puff(s) Inhalation every 6 hours PRN wheezing  ondansetron    Tablet 4 milliGRAM(s) Oral every 6 hours PRN Nausea and/or Vomiting  oxyCODONE    IR 5 milliGRAM(s) Oral every 3 hours PRN Moderate Pain (4 - 6)  oxyCODONE    IR 10 milliGRAM(s) Oral every 3 hours PRN Severe Pain (7 - 10)      Objective:    Vitals: Vital Signs Last 24 Hrs  T(C): 36.8 (04-29-25 @ 05:35), Max: 37 (04-28-25 @ 20:40)  T(F): 98.3 (04-29-25 @ 05:35), Max: 98.6 (04-28-25 @ 20:40)  HR: 81 (04-29-25 @ 05:35) (76 - 91)  BP: 124/63 (04-29-25 @ 05:35) (108/62 - 124/63)  BP(mean): --  RR: 18 (04-29-25 @ 05:35) (18 - 18)  SpO2: 96% (04-29-25 @ 05:35) (96% - 98%)                I&O's Summary    28 Apr 2025 07:01  -  29 Apr 2025 07:00  --------------------------------------------------------  IN: 840 mL / OUT: 900 mL / NET: -60 mL        PHYSICAL EXAM:  GENERAL: NAD, eating breakfast   HEAD:  Atraumatic, Normocephalic  EYES: EOMI, conjunctiva and sclera clear  CHEST/LUNG: Unlabored respirations   HEART: Appears well perfused   ABDOMEN: Soft, Nontender, Nondistended;   SKIN: No rashes or lesions  NERVOUS SYSTEM:  Alert & Oriented X3, no focal deficits    LABS:                        12.3   11.82 )-----------( 335      ( 29 Apr 2025 06:37 )             37.3                         12.7   13.83 )-----------( 249      ( 28 Apr 2025 07:30 )             40.0                         12.9   15.17 )-----------( 301      ( 27 Apr 2025 11:45 )             38.3     04-29    132[L]  |  94[L]  |  10  ----------------------------<  84  4.5   |  27  |  0.82  04-28    128[L]  |  93[L]  |  12  ----------------------------<  91  4.3   |  26  |  0.81  04-27    133[L]  |  96[L]  |  13  ----------------------------<  87  4.5   |  27  |  0.84    Ca    8.6      29 Apr 2025 06:37  Ca    8.4      28 Apr 2025 07:30  Ca    8.6      27 Apr 2025 11:45  Phos  3.1     04-29  Mg     2.20     04-29      CAPILLARY BLOOD GLUCOSE            Urinalysis Basic - ( 29 Apr 2025 06:37 )    Color: x / Appearance: x / SG: x / pH: x  Gluc: 84 mg/dL / Ketone: x  / Bili: x / Urobili: x   Blood: x / Protein: x / Nitrite: x   Leuk Esterase: x / RBC: x / WBC x   Sq Epi: x / Non Sq Epi: x / Bacteria: x          RADIOLOGY & ADDITIONAL TESTS:    Imaging Personally Reviewed:  [x ] YES  [ ] NO    Consultants involved in case:   Consultant(s) Notes Reviewed:  [ x] YES  [ ] NO:   Care Discussed with Consultants/Other Providers [x ] YES  [ ] NO

## 2025-04-29 NOTE — DISCHARGE NOTE NURSING/CASE MANAGEMENT/SOCIAL WORK - NSDCPEFALRISK_GEN_ALL_CORE
For information on Fall & Injury Prevention, visit: [email or web address] OR  [email or web address] OR  https://www.cdc.gov/steadi/patient.html

## 2025-04-29 NOTE — PROGRESS NOTE ADULT - PROBLEM SELECTOR PLAN 1
- CTA PE notable for RLL pneumonia and left linear atelectasis  - s/p CTX and azithromycin in the ED  - full RVP negative, CXR clear  - bcx strep pneumo pansensitive   - Leukocytosis initially downtrending, WBC 16 as of 4/26 with neutrophil predominance, BP lower than baseline    PLAN:  - Repeat CXR, RLL PNA and pleural effusion unchanged from prior   - urine strep, legionella -> negative   - Nasal swab positive for staph aureus   - cw ctx 2g q24 per ID, plan for total 14 days of abx, dc abx recs per ID  - pain regimen: PO Oxycodone 5mg and 10mg for mod/sev pain  - zofran 4mg PO prn for nausea  - speech eval, thin liquids and regular diet - CTA PE notable for RLL pneumonia and left linear atelectasis  - s/p CTX and azithromycin in the ED  - full RVP negative, CXR clear  - bcx strep pneumo pansensitive   - Leukocytosis initially downtrending, WBC 16 as of 4/26 with neutrophil predominance, BP lower than baseline    PLAN:  - Repeat CXR, RLL PNA and pleural effusion unchanged from prior   - urine strep, legionella -> negative   - Nasal swab positive for staph aureus   - cw ctx 2g q24 per ID, DC with levaquin 750mg qd until 5/4   - pain regimen: PO Oxycodone 5mg and 10mg for mod/sev pain  - zofran 4mg PO prn for nausea  - speech eval, thin liquids and regular diet

## 2025-04-29 NOTE — PROGRESS NOTE ADULT - ASSESSMENT
Patient is a 80 y/o male h/o HTN, HLD, COPD, paroxysmal A-fib with loop recorder not on AC c/o chest pain, fatigue, shortness of breath for the last day. Patient meeting sepsis criteria with leukocytosis, hypotension, borderline bandemia, with a lactate, findings on CTA PE notable for RLL pneumonia. ID consulted, likely will need 2 weeks of total abx, cultures with pansensitive strep pneumo. Repeat imaging for severe new R lower back pain negative. MR lumbar spine with degenerative changes. EKG with new afib RVR with rates in 130s, now back in sinus as of 4/27 7am.  Patient is a 80 y/o male h/o HTN, HLD, COPD, paroxysmal A-fib with loop recorder not on AC c/o chest pain, fatigue, shortness of breath for the last day. Patient meeting sepsis criteria with leukocytosis, hypotension, borderline bandemia, with a lactate, findings on CTA PE notable for RLL pneumonia. ID consulted, likely will need 2 weeks of total abx, cultures with pansensitive strep pneumo. Repeat imaging for severe new R lower back pain negative. MR lumbar spine with degenerative changes. EKG with new afib RVR with rates in 130s, now back in sinus as of 4/27 7am. Discharge with PO levaquin and short course of oxycodone.

## 2025-04-29 NOTE — DISCHARGE NOTE NURSING/CASE MANAGEMENT/SOCIAL WORK - PATIENT PORTAL LINK FT
You can access the FollowMyHealth Patient Portal offered by [facility] by registering at the following website: [email or web address]. By joining [email or web address]’s FollowMyHealth portal, you will also be able to view your health information using other applications (apps) compatible with our system.

## 2025-04-29 NOTE — PROGRESS NOTE ADULT - PROBLEM SELECTOR PLAN 11
DVT ppx: SCDs, patient refusing AC   Diet: DASH  Dispo: Pending repeat Bcx, then plan to DC tomorrow DVT ppx: SCDs, patient refusing AC   Diet: DASH  Dispo: Discharge home

## 2025-04-29 NOTE — PROGRESS NOTE ADULT - PROBLEM SELECTOR PLAN 3
Patient with one episode of paroxysmal afib in the setting of taking albuterol inhaler frequently over the course of one day.  Per patient, no further episodes of afib recorded with ILR  Not on AC, Chadsvasc score 2  EKG on 4/23 with afib and rate in 110s, 4/27 sinus rhythm   TTE grossly unremarkable   Loop recorder interrogated, afib burden 2.2%    PLAN:  - Metoprolol 50mg qd   - Tele monitoring   - EP consult, recommend AC, patient continues to decline AC and ablation/DCCV, outpatient f/u

## 2025-04-29 NOTE — PHARMACOTHERAPY INTERVENTION NOTE - COMMENTS
Discharge medications reviewed with the patient. Current medication schedule was discussed in detail including: medication name, indication, dose, administration times, treatment duration, and side effects. Patient questions and concerns were answered and addressed. Patient demonstrated understanding.     New medications:   - Levofloxacin, oxycodone, xopenex, metoprolol (dose increased)    Jordan Lopez PharmD  Clinical Pharmacy Specialist  Spectra [phone]

## 2025-04-29 NOTE — PROGRESS NOTE ADULT - PROBLEM SELECTOR PLAN 4
Meeting sepsis criteria with leukocytosis, hypotension, lactate, borderline bandemia  s/p 2.5L IVF in ED  s/p CTX and azithromycin in ED  bacteremic with strep pneumo pending sensitivities  RESOLVED     PLAN:  - antibiotics as above  - Repeat Bcx sent 4/21, NGTD  - Repeat Bcx sent 4/28, pending

## 2025-04-29 NOTE — PROGRESS NOTE ADULT - PROBLEM SELECTOR PROBLEM 1
RLL pneumonia

## 2025-04-29 NOTE — DISCHARGE NOTE NURSING/CASE MANAGEMENT/SOCIAL WORK - FINANCIAL ASSISTANCE
[facility] provides services at a reduced cost to those who are determined to be eligible through [facility]’s financial assistance program. Information regarding [facility]’s financial assistance program can be found by going to [email or web address] or by calling [phone].

## 2025-04-29 NOTE — PROGRESS NOTE ADULT - PROVIDER SPECIALTY LIST ADULT
Electrophysiology
Infectious Disease
Internal Medicine
Infectious Disease
Electrophysiology
Electrophysiology
Infectious Disease
Infectious Disease
Internal Medicine

## 2025-05-03 LAB
CULTURE RESULTS: SIGNIFICANT CHANGE UP
SPECIMEN SOURCE: SIGNIFICANT CHANGE UP

## 2025-05-22 ENCOUNTER — APPOINTMENT (OUTPATIENT)
Dept: ELECTROPHYSIOLOGY | Facility: CLINIC | Age: 80
End: 2025-05-22

## 2025-05-23 ENCOUNTER — APPOINTMENT (OUTPATIENT)
Dept: INFECTIOUS DISEASE | Facility: CLINIC | Age: 80
End: 2025-05-23

## 2025-06-23 ENCOUNTER — NON-APPOINTMENT (OUTPATIENT)
Age: 80
End: 2025-06-23

## 2025-06-23 ENCOUNTER — APPOINTMENT (OUTPATIENT)
Dept: ELECTROPHYSIOLOGY | Facility: CLINIC | Age: 80
End: 2025-06-23
Payer: MEDICARE

## 2025-06-23 VITALS
BODY MASS INDEX: 24.2 KG/M2 | HEIGHT: 70 IN | HEART RATE: 70 BPM | OXYGEN SATURATION: 97 % | SYSTOLIC BLOOD PRESSURE: 155 MMHG | WEIGHT: 169 LBS | DIASTOLIC BLOOD PRESSURE: 77 MMHG

## 2025-06-23 VITALS — DIASTOLIC BLOOD PRESSURE: 76 MMHG | SYSTOLIC BLOOD PRESSURE: 132 MMHG

## 2025-06-23 PROCEDURE — 93000 ELECTROCARDIOGRAM COMPLETE: CPT

## 2025-06-23 PROCEDURE — 99215 OFFICE O/P EST HI 40 MIN: CPT | Mod: 25
